# Patient Record
Sex: FEMALE | Race: WHITE
[De-identification: names, ages, dates, MRNs, and addresses within clinical notes are randomized per-mention and may not be internally consistent; named-entity substitution may affect disease eponyms.]

---

## 2017-03-13 ENCOUNTER — HOSPITAL ENCOUNTER (OUTPATIENT)
Dept: HOSPITAL 75 - RAD | Age: 62
End: 2017-03-13
Attending: FAMILY MEDICINE
Payer: COMMERCIAL

## 2017-03-13 DIAGNOSIS — R06.00: Primary | ICD-10-CM

## 2017-03-13 PROCEDURE — 71020: CPT

## 2017-03-13 NOTE — DIAGNOSTIC IMAGING REPORT
PA and lateral views of the chest



Indication:  Dyspnea



Findings: The lungs are clear. The heart size is normal. There

is no effusion or pneumothorax



The mediastinum and zi appear unremarkable. Surgical clips in

the upper abdomen seen.



Impression: Unremarkable study.



Dictated by:



Dictated on workstation # [id number]

## 2017-03-13 NOTE — XMS REPORT
Continuity of Care Document

 Created on: 2017



Amalia Sommers

External Reference #: [id number]

: 1955

Sex: Female



Demographics







 Address  [address] [place]  [address]

 

 Home Phone  [phone]

 

 Preferred Language  English

 

 Marital Status  

 

 Cheondoism Affiliation  CHR

 

 Race  White or 

 

 Ethnic Group  Not  or 





Author







 Author  [facility]

 

 Organization  [facility]

 

 Address  Unknown

 

 Phone  Unavailable







Support







 Name  Relationship  Address  Phone

 

 , Rudi Sommers  [employer]  -

Unknown  [phone]







Care Team Providers







 Care Team Member Name  Role  Phone

 

 Zuleika Slater  PCP  [phone]







Source Comments

Some departments are not documenting in the electronic medical record.  If you 
do not see the information that you expected, contact Release of Information in 
the Health Information Management department at [phone] for further 
assistance in locating additional records.[facility]



Active Allergies and Adverse Reactions







    



  Allergen   Noted Date   Severity   Reactions   Comments

 

    



  Amitriptyline   2011    JOINT PAIN 

 

    



  Axid   2011    HIVES, EDEMA 

 

    



  Biaxin   2011    ANAPHYLAXIS 

 

    



  Erythromycin   2011    SEE COMMENTS   Severe stomach pains and



      vomiting

 

    



  Macrodantin   2011    HIVES 

 

    



  Nitrofurantoin   2011    UNKNOWN 

 

    



  Penicillins   2011    ANAPHYLAXIS, HIVES 

 

    



  Premarin   2011    JOINT PAIN, MUSCLE PAIN 

 

    



  Prilosec   2011    DIARRHEA 

 

    



  Reglan   2011    JOINT PAIN 

 

    



  Sulfa (Sulfonamide   2011    ANAPHYLAXIS, HIVES 



  Antibiotics)    

 

    



  Terfenadine   2011    HIVES, ANGIOEDEMA 







Current Medications







      



  Prescription   Sig.   Disp.   Refills   Start   End Date   Status



      Date  

 

      



  cyanocobalamin (VITAMIN   Inject 1,000 mcg to       Active



  B-12, RUBRAMIN) 1,000   area(s) as directed every     



  mcg/mL IJ injection   30 days.     

 

      



  bisoprolol (ZEBETA) 5 mg   Take 10 mg by mouth at       Active



  PO tablet   bedtime daily.     

 

      



  trazodone (DESYREL) 50 mg   Take 50 mg by mouth at       Active



  PO tablet   bedtime daily.     

 

      



  estradiol(+)   Apply 1 Patch to top of       Active



  (VIVELLE-DOT) 0.1 mg/day   skin as directed twice     



  TD patch   weekly.     

 

      



  ergocalciferol (VITAMIN   Take 50,000 Units by       Active



  D) 50,000 unit capsule   mouth every 7 days. Takes     



   on      

 

      



  DULoxetine DR (CYMBALTA)   Take 60 mg by mouth       Active



  60 mg capsule   daily.     

 

      



  levothyroxine (SYNTHROID)   Take 75 mcg by mouth       Active



  75 mcg tablet   daily. Brand name only     

 

      



  dicyclomine (BENTYL) 10   Take 10 mg by mouth four       Active



  mg capsule   times daily as needed.     

 

      



  hyoscyamine (ANASPAZ;   Place 125 mcg under       Active



  NULEV; SYMAX FASTABS;   tongue every 4 hours as     



  HYOMAX-FT; ED-SPAZ;   needed.     



  OSCIMIN) 0.125 mg rapid      



  dissolve tablet      

 

      



  fexofenadine(+) (ALLEGRA)   Take 180 mg by mouth as       Active



  180 mg tablet   Needed.     

 

      



  senna/docusate   Take 1 Tab by mouth twice   30 Tab   3   20    Active



  (SENOKOT-S) 8.6/50 mg   daily.     14  



  tablet      







Active Problems







 



  Problem   Noted Date

 

 



  Renal mass   2014

 

 



  Renal oncocytoma   2014













  Overview:



  Incidentally found on imaging for ankylosing spondylitis,  Approximately



  1.2cm x 1.2cm.



  Completely asymptomatic.





  3/17/2014: Kidney, "left renal mass", partial nephrectomy:



  Renal oncocytoma





  2015: lt kidney wnl. Right with small mass 1cm.





  2016:



  1. Prior partial left nephrectomy without recurrent left renal mass.





  2. Stable hyperechoic area in or adjacent to the lower pole right kidney



  which is most compatible with a benign angiomyolipoma or tiny nodular area



  of perinephric fat.





  3. Persistent diffuse hepatic steatosis.





  L



  ast Assessment & Plan:



  Doing well



  Stable mass



  RTC 2 years with PAUL Brito MD









 



  Thoracic or lumbosacral neuritis or radiculitis, unspecified   2011

 

 



  Displacement of intervertebral disc, site unspecified, without myelopathy   

 

 



  Chronic low back pain   2011

 

 



  Spondyloarthropathy (HCC)   2011

 

 



  Radiculopathy   2011

 

 



  Osteoarthritis   2011

 

 



  Encounter for long-term (current) use of medications   2011

 

 



  Ankylosing spondylitis (HCC)   2011

 

 



  Sicca syndrome, Sjogren's (HCC)   2011

 

 



  Encounter for long-term (current) use of other medications   2011







Social History







    



  Tobacco Use   Types   Packs/Day   Years Used   Date

 

    



  Never Smoker    









   



  Smokeless Tobacco: Never   



  Used   









   



  Alcohol Use   Drinks/Week   oz/Week   Comments

 

   



  No   







Last Filed Vital Signs







  



  Vital Sign   Reading   Time Taken

 

  



  Blood Pressure   132/63   2016  9:59 AM CDT

 

  



  Pulse   71   2016  9:59 AM CDT

 

  



  Temperature   36.7   C (98.1   F)   10/12/2015  1:18 PM CDT

 

  



  Respiratory Rate   16   2015  9:07 AM CDT

 

  



  Height   1.689 m (5' 6.5")   2016  9:59 AM CDT

 

  



  Weight   88.27 kg (194 lb 9.6 oz)   2016  9:59 AM CDT

 

  



  Body Mass Index   30.94   2016  9:59 AM CDT

 

  



  Oxygen Saturation   96%   10/12/2015  2:25 PM CDT







Plan of Care







   



  Health Maintenance   Due Date   Last Done   Comments

 

   



  Hepatitis C Screening   1955  

 

   



  Physical (Comprehensive)   1962  



  Exam   

 

   



  Pertussis Vaccine   1966  

 

   



  Tetanus Vaccine   1972  

 

   



  Cervical Cancer Screening   1976  

 

   



  Breast Cancer Screening   1995  

 

   



  Shingles Vaccine   2015  

 

   



  Influenza Vaccine   2016  

 

   



  Colorectal Cancer   10/12/2025   10/12/2015 



  Screening   







Results from Last 3 Months

Not on file

## 2017-05-09 ENCOUNTER — HOSPITAL ENCOUNTER (OUTPATIENT)
Dept: HOSPITAL 75 - CARD | Age: 62
End: 2017-05-09
Attending: NURSE PRACTITIONER
Payer: COMMERCIAL

## 2017-05-09 VITALS — DIASTOLIC BLOOD PRESSURE: 64 MMHG | SYSTOLIC BLOOD PRESSURE: 149 MMHG

## 2017-05-09 VITALS — DIASTOLIC BLOOD PRESSURE: 61 MMHG | SYSTOLIC BLOOD PRESSURE: 156 MMHG

## 2017-05-09 VITALS — SYSTOLIC BLOOD PRESSURE: 149 MMHG | DIASTOLIC BLOOD PRESSURE: 71 MMHG

## 2017-05-09 DIAGNOSIS — R00.2: ICD-10-CM

## 2017-05-09 DIAGNOSIS — R06.09: ICD-10-CM

## 2017-05-09 DIAGNOSIS — I10: Primary | ICD-10-CM

## 2017-05-09 DIAGNOSIS — R07.89: ICD-10-CM

## 2017-05-09 PROCEDURE — 93017 CV STRESS TEST TRACING ONLY: CPT

## 2017-05-09 PROCEDURE — 78452 HT MUSCLE IMAGE SPECT MULT: CPT

## 2017-05-09 NOTE — STRESS TEST
DATE OF SERVICE:  05/09/2017



DATE:

05/09/2017.



ORDERING PHYSICIAN:

JAYME Garcia.



PRIMARY PHYSICIAN:

Dr. Slater 



CLINICAL DIAGNOSIS:

Chest discomfort, shortness of breath, hypertension, palpitations.



Baseline images were carried out after injection of 10.02 mCi of technetium-99m

Tetrofosmin.  This was followed by 0.4 mg of regadenoson and 30.1 mCi of

technetium-99m Tetrofosmin for stress imaging.  The electrocardiogram showed

sinus rhythm and the electrocardiogram did not change significantly with the

regadenoson infusion.  She reported shortness of breath and abdominal cramping

following regadenoson infusion, which resolved in a few minutes.  Review of

images at rest and following stress indicates diminished count uptake in the

anterolateral wall, both at rest and following regadenoson infusion.  This

appears to be a breast artifact.  Gated images show normal global left

ventricular systolic function with normal regional wall motion, including the

inferolateral wall.  Left ventricular ejection fraction is calculated to be 66%.

 Left ventricular end-diastolic volume is 66 mL.  TID is absent (1.1).



CONCLUSIONS:

1.  No distinct evidence of any significant myocardial ischemia or infarction on

this study.

2.  Normal regional wall motion.

3.  Normal global left ventricular systolic function with a calculated ejection

fraction of 66%.

4.  Normal left ventricular cavity size.





Job ID: [id number]

DocumentID: [id number]

Dictated Date:  05/09/2017 15:36:55

Transcription Date: 05/09/2017 16:05:29

Dictated By: PALAK LIZAMA MD, MA, FACP, FACC,

## 2017-05-11 ENCOUNTER — HOSPITAL ENCOUNTER (OUTPATIENT)
Dept: HOSPITAL 75 - CARD | Age: 62
End: 2017-05-11
Attending: NURSE PRACTITIONER
Payer: COMMERCIAL

## 2017-05-11 DIAGNOSIS — R06.09: ICD-10-CM

## 2017-05-11 DIAGNOSIS — R00.2: ICD-10-CM

## 2017-05-11 DIAGNOSIS — R07.89: ICD-10-CM

## 2017-05-11 DIAGNOSIS — I10: Primary | ICD-10-CM

## 2017-06-28 ENCOUNTER — APPOINTMENT (RX ONLY)
Dept: URBAN - METROPOLITAN AREA CLINIC 51 | Facility: CLINIC | Age: 62
Setting detail: DERMATOLOGY
End: 2017-06-28

## 2017-06-28 DIAGNOSIS — L82.1 OTHER SEBORRHEIC KERATOSIS: ICD-10-CM

## 2017-06-28 DIAGNOSIS — L81.4 OTHER MELANIN HYPERPIGMENTATION: ICD-10-CM

## 2017-06-28 PROBLEM — E13.9 OTHER SPECIFIED DIABETES MELLITUS WITHOUT COMPLICATIONS: Status: ACTIVE | Noted: 2017-06-28

## 2017-06-28 PROBLEM — I10 ESSENTIAL (PRIMARY) HYPERTENSION: Status: ACTIVE | Noted: 2017-06-28

## 2017-06-28 PROBLEM — D48.5 NEOPLASM OF UNCERTAIN BEHAVIOR OF SKIN: Status: ACTIVE | Noted: 2017-06-28

## 2017-06-28 PROCEDURE — 99213 OFFICE O/P EST LOW 20 MIN: CPT | Mod: 25

## 2017-06-28 PROCEDURE — 11100: CPT | Mod: 59

## 2017-06-28 PROCEDURE — 17110 DESTRUCTION B9 LES UP TO 14: CPT

## 2017-06-28 PROCEDURE — ? BIOPSY BY PUNCH METHOD

## 2017-06-28 PROCEDURE — ? LIQUID NITROGEN

## 2017-06-28 PROCEDURE — ? COUNSELING

## 2017-06-28 ASSESSMENT — LOCATION ZONE DERM
LOCATION ZONE: LEG
LOCATION ZONE: TRUNK
LOCATION ZONE: HAND
LOCATION ZONE: ARM
LOCATION ZONE: FACE

## 2017-06-28 ASSESSMENT — LOCATION DETAILED DESCRIPTION DERM
LOCATION DETAILED: INFERIOR MID FOREHEAD
LOCATION DETAILED: RIGHT MEDIAL MALAR CHEEK
LOCATION DETAILED: LEFT DISTAL DORSAL FOREARM
LOCATION DETAILED: LEFT DISTAL PRETIBIAL REGION
LOCATION DETAILED: LEFT LATERAL FOREHEAD
LOCATION DETAILED: RIGHT DISTAL PRETIBIAL REGION
LOCATION DETAILED: RIGHT PROXIMAL PRETIBIAL REGION
LOCATION DETAILED: RIGHT INFERIOR MEDIAL FOREHEAD
LOCATION DETAILED: RIGHT INFERIOR FOREHEAD
LOCATION DETAILED: RIGHT MID-UPPER BACK
LOCATION DETAILED: RIGHT DISTAL DORSAL FOREARM
LOCATION DETAILED: RIGHT LATERAL ABDOMEN
LOCATION DETAILED: LEFT ULNAR DORSAL HAND
LOCATION DETAILED: LEFT INFERIOR LATERAL FOREHEAD
LOCATION DETAILED: LEFT RADIAL DORSAL HAND
LOCATION DETAILED: RIGHT FOREHEAD

## 2017-06-28 ASSESSMENT — LOCATION SIMPLE DESCRIPTION DERM
LOCATION SIMPLE: LEFT FOREHEAD
LOCATION SIMPLE: ABDOMEN
LOCATION SIMPLE: RIGHT FOREHEAD
LOCATION SIMPLE: RIGHT FOREARM
LOCATION SIMPLE: RIGHT CHEEK
LOCATION SIMPLE: LEFT FOREARM
LOCATION SIMPLE: INFERIOR FOREHEAD
LOCATION SIMPLE: LEFT HAND
LOCATION SIMPLE: RIGHT UPPER BACK
LOCATION SIMPLE: LEFT PRETIBIAL REGION
LOCATION SIMPLE: RIGHT PRETIBIAL REGION

## 2017-06-28 NOTE — PROCEDURE: LIQUID NITROGEN
Detail Level: Zone
Duration Of Freeze Thaw-Cycle (Seconds): 5
Post-Care Instructions: I reviewed with the patient in detail post-care instructions. Patient is to wear sunprotection, and avoid picking at any of the treated lesions. Pt may apply Vaseline to crusted or scabbing areas.
Number Of Freeze-Thaw Cycles: 5 freeze-thaw cycles
Render Post-Care Instructions In Note?: no
Consent: The patient's  verbal consent was obtained including but not limited to risks of crusting, scabbing, blistering, scarring, darker or lighter pigmentary change, recurrence, incomplete removal and infection.
Medical Necessity Information: It is in your best interest to select a reason for this procedure from the list below. All of these items fulfill various CMS LCD requirements except the new and changing color options.
Medical Necessity Clause: This procedure was medically necessary because the lesions that were treated were: Itchy, irritated by clothing
Bill Insurance (You Assume Risk Of Denial Or Audit By Selecting Yes): Yes

## 2017-06-28 NOTE — PROCEDURE: BIOPSY BY PUNCH METHOD
Size Of Lesion In Cm (Optional): 0
Post-Care Instructions: I reviewed with the patient in detail post-care instructions. Patient is to keep the biopsy site dry overnight, and then apply vaseline twice daily until healed. Patient may apply hydrogen peroxide soaks to remove any crusting.
Render Post-Care Instructions In Note?: yes
Bill 26104 For Specimen Handling/Conveyance To Laboratory?: no
Biopsy Type: H and E
Consent: verbal consent was obtained and risks were reviewed including but not limited to scarring, infection, bleeding, scabbing, incomplete removal, nerve damage and allergy to anesthesia.
Epidermal Sutures: 4-0 Nylon
Lab Facility: [id number]
Anesthesia Volume In Cc (Will Not Render If 0): 2
Detail Level: Detailed
Wound Care: Aquaphor
Suture Removal: 8 days
Notification Instructions: Patient will be notified of biopsy results. However, patient instructed to call the office if not contacted within 2 weeks.
Anesthesia Type: 1% lidocaine with epinephrine
Hemostasis: Pressure
Billing Type: Third-Party Bill
Punch Size In Mm: 4
Lab: [id number]
Home Suture Removal Text: Patient was provided a home suture removal kit and will remove their sutures at home.  If they have any questions or difficulties they will call the office.
Dressing: bandage

## 2018-06-10 ENCOUNTER — HOSPITAL ENCOUNTER (OUTPATIENT)
Dept: HOSPITAL 75 - LAB | Age: 63
End: 2018-06-10
Attending: NURSE PRACTITIONER
Payer: COMMERCIAL

## 2018-06-10 DIAGNOSIS — R11.0: ICD-10-CM

## 2018-06-10 DIAGNOSIS — N10: Primary | ICD-10-CM

## 2018-06-10 LAB
ALBUMIN SERPL-MCNC: 4.4 GM/DL (ref 3.2–4.5)
ALP SERPL-CCNC: 74 U/L (ref 40–136)
ALT SERPL-CCNC: 23 U/L (ref 0–55)
BASOPHILS # BLD AUTO: 0 10^3/UL (ref 0–0.1)
BASOPHILS NFR BLD AUTO: 0 % (ref 0–10)
BILIRUB SERPL-MCNC: 0.3 MG/DL (ref 0.1–1)
BUN/CREAT SERPL: 15
CALCIUM SERPL-MCNC: 9.5 MG/DL (ref 8.5–10.1)
CHLORIDE SERPL-SCNC: 103 MMOL/L (ref 98–107)
CO2 SERPL-SCNC: 21 MMOL/L (ref 21–32)
CREAT SERPL-MCNC: 0.84 MG/DL (ref 0.6–1.3)
EOSINOPHIL # BLD AUTO: 0 10^3/UL (ref 0–0.3)
EOSINOPHIL NFR BLD AUTO: 0 % (ref 0–10)
ERYTHROCYTE [DISTWIDTH] IN BLOOD BY AUTOMATED COUNT: 13.6 % (ref 10–14.5)
GFR SERPLBLD BASED ON 1.73 SQ M-ARVRAT: > 60 ML/MIN
GLUCOSE SERPL-MCNC: 110 MG/DL (ref 70–105)
HCT VFR BLD CALC: 41 % (ref 35–52)
HGB BLD-MCNC: 14.4 G/DL (ref 11.5–16)
LYMPHOCYTES # BLD AUTO: 2 X 10^3 (ref 1–4)
LYMPHOCYTES NFR BLD AUTO: 20 % (ref 12–44)
MANUAL DIFFERENTIAL PERFORMED BLD QL: NO
MCH RBC QN AUTO: 32 PG (ref 25–34)
MCHC RBC AUTO-ENTMCNC: 35 G/DL (ref 32–36)
MCV RBC AUTO: 91 FL (ref 80–99)
MONOCYTES # BLD AUTO: 0.9 X 10^3 (ref 0–1)
MONOCYTES NFR BLD AUTO: 10 % (ref 0–12)
NEUTROPHILS # BLD AUTO: 6.8 X 10^3 (ref 1.8–7.8)
NEUTROPHILS NFR BLD AUTO: 69 % (ref 42–75)
PLATELET # BLD: 357 10^3/UL (ref 130–400)
PMV BLD AUTO: 9.2 FL (ref 7.4–10.4)
POTASSIUM SERPL-SCNC: 4.8 MMOL/L (ref 3.6–5)
PROT SERPL-MCNC: 8.4 GM/DL (ref 6.4–8.2)
RBC # BLD AUTO: 4.47 10^6/UL (ref 4.35–5.85)
SODIUM SERPL-SCNC: 136 MMOL/L (ref 135–145)
WBC # BLD AUTO: 9.8 10^3/UL (ref 4.3–11)

## 2018-06-10 PROCEDURE — 85025 COMPLETE CBC W/AUTO DIFF WBC: CPT

## 2018-06-10 PROCEDURE — 36415 COLL VENOUS BLD VENIPUNCTURE: CPT

## 2018-06-10 PROCEDURE — 80053 COMPREHEN METABOLIC PANEL: CPT

## 2018-06-10 PROCEDURE — 86141 C-REACTIVE PROTEIN HS: CPT

## 2018-06-26 ENCOUNTER — HOSPITAL ENCOUNTER (INPATIENT)
Dept: HOSPITAL 75 - 4TH | Age: 63
LOS: 3 days | Discharge: HOME | DRG: 194 | End: 2018-06-29
Attending: FAMILY MEDICINE | Admitting: FAMILY MEDICINE
Payer: COMMERCIAL

## 2018-06-26 VITALS — HEIGHT: 66 IN | BODY MASS INDEX: 28.93 KG/M2 | WEIGHT: 180 LBS

## 2018-06-26 VITALS — SYSTOLIC BLOOD PRESSURE: 135 MMHG | DIASTOLIC BLOOD PRESSURE: 62 MMHG

## 2018-06-26 VITALS — DIASTOLIC BLOOD PRESSURE: 58 MMHG | SYSTOLIC BLOOD PRESSURE: 126 MMHG

## 2018-06-26 DIAGNOSIS — K21.9: ICD-10-CM

## 2018-06-26 DIAGNOSIS — Z86.010: ICD-10-CM

## 2018-06-26 DIAGNOSIS — K44.9: ICD-10-CM

## 2018-06-26 DIAGNOSIS — Z87.11: ICD-10-CM

## 2018-06-26 DIAGNOSIS — Z90.710: ICD-10-CM

## 2018-06-26 DIAGNOSIS — J18.9: Primary | ICD-10-CM

## 2018-06-26 DIAGNOSIS — K57.90: ICD-10-CM

## 2018-06-26 DIAGNOSIS — M45.9: ICD-10-CM

## 2018-06-26 DIAGNOSIS — K50.90: ICD-10-CM

## 2018-06-26 DIAGNOSIS — I25.10: ICD-10-CM

## 2018-06-26 DIAGNOSIS — Z87.440: ICD-10-CM

## 2018-06-26 DIAGNOSIS — K59.00: ICD-10-CM

## 2018-06-26 DIAGNOSIS — I10: ICD-10-CM

## 2018-06-26 DIAGNOSIS — I25.2: ICD-10-CM

## 2018-06-26 DIAGNOSIS — L93.0: ICD-10-CM

## 2018-06-26 DIAGNOSIS — Z87.442: ICD-10-CM

## 2018-06-26 DIAGNOSIS — E11.9: ICD-10-CM

## 2018-06-26 DIAGNOSIS — R33.9: ICD-10-CM

## 2018-06-26 DIAGNOSIS — E89.0: ICD-10-CM

## 2018-06-26 DIAGNOSIS — M19.91: ICD-10-CM

## 2018-06-26 DIAGNOSIS — I34.1: ICD-10-CM

## 2018-06-26 LAB
BASOPHILS # BLD AUTO: 0 10^3/UL (ref 0–0.1)
BASOPHILS NFR BLD AUTO: 0 % (ref 0–10)
BUN/CREAT SERPL: 14
CALCIUM SERPL-MCNC: 9.4 MG/DL (ref 8.5–10.1)
CHLORIDE SERPL-SCNC: 102 MMOL/L (ref 98–107)
CO2 SERPL-SCNC: 22 MMOL/L (ref 21–32)
CREAT SERPL-MCNC: 0.84 MG/DL (ref 0.6–1.3)
EOSINOPHIL # BLD AUTO: 0 10^3/UL (ref 0–0.3)
EOSINOPHIL NFR BLD AUTO: 0 % (ref 0–10)
ERYTHROCYTE [DISTWIDTH] IN BLOOD BY AUTOMATED COUNT: 13.3 % (ref 10–14.5)
GFR SERPLBLD BASED ON 1.73 SQ M-ARVRAT: > 60 ML/MIN
GLUCOSE SERPL-MCNC: 134 MG/DL (ref 70–105)
HCT VFR BLD CALC: 37 % (ref 35–52)
HGB BLD-MCNC: 12.9 G/DL (ref 11.5–16)
LYMPHOCYTES # BLD AUTO: 2.2 X 10^3 (ref 1–4)
LYMPHOCYTES NFR BLD AUTO: 23 % (ref 12–44)
MANUAL DIFFERENTIAL PERFORMED BLD QL: NO
MCH RBC QN AUTO: 32 PG (ref 25–34)
MCHC RBC AUTO-ENTMCNC: 35 G/DL (ref 32–36)
MCV RBC AUTO: 91 FL (ref 80–99)
MONOCYTES # BLD AUTO: 0.8 X 10^3 (ref 0–1)
MONOCYTES NFR BLD AUTO: 8 % (ref 0–12)
NEUTROPHILS # BLD AUTO: 6.4 X 10^3 (ref 1.8–7.8)
NEUTROPHILS NFR BLD AUTO: 68 % (ref 42–75)
PLATELET # BLD: 435 10^3/UL (ref 130–400)
PMV BLD AUTO: 9 FL (ref 7.4–10.4)
POTASSIUM SERPL-SCNC: 4.4 MMOL/L (ref 3.6–5)
RBC # BLD AUTO: 4.05 10^6/UL (ref 4.35–5.85)
SODIUM SERPL-SCNC: 135 MMOL/L (ref 135–145)
WBC # BLD AUTO: 9.5 10^3/UL (ref 4.3–11)

## 2018-06-26 PROCEDURE — 85025 COMPLETE CBC W/AUTO DIFF WBC: CPT

## 2018-06-26 PROCEDURE — 80048 BASIC METABOLIC PNL TOTAL CA: CPT

## 2018-06-26 PROCEDURE — 94664 DEMO&/EVAL PT USE INHALER: CPT

## 2018-06-26 PROCEDURE — 36415 COLL VENOUS BLD VENIPUNCTURE: CPT

## 2018-06-26 PROCEDURE — 82962 GLUCOSE BLOOD TEST: CPT

## 2018-06-26 PROCEDURE — 80053 COMPREHEN METABOLIC PANEL: CPT

## 2018-06-26 PROCEDURE — 87040 BLOOD CULTURE FOR BACTERIA: CPT

## 2018-06-26 PROCEDURE — 94640 AIRWAY INHALATION TREATMENT: CPT

## 2018-06-26 PROCEDURE — 94760 N-INVAS EAR/PLS OXIMETRY 1: CPT

## 2018-06-26 PROCEDURE — 71046 X-RAY EXAM CHEST 2 VIEWS: CPT

## 2018-06-26 PROCEDURE — 85027 COMPLETE CBC AUTOMATED: CPT

## 2018-06-26 RX ADMIN — ENOXAPARIN SODIUM SCH MG: 100 INJECTION SUBCUTANEOUS at 15:38

## 2018-06-26 RX ADMIN — GABAPENTIN SCH MG: 300 CAPSULE ORAL at 21:37

## 2018-06-26 RX ADMIN — DOCUSATE SODIUM SCH MG: 100 CAPSULE ORAL at 20:22

## 2018-06-26 RX ADMIN — SODIUM CHLORIDE SCH MLS/HR: 900 INJECTION, SOLUTION INTRAVENOUS at 16:11

## 2018-06-26 RX ADMIN — DULOXETINE HYDROCHLORIDE SCH MG: 30 CAPSULE, DELAYED RELEASE ORAL at 21:37

## 2018-06-26 RX ADMIN — IPRATROPIUM BROMIDE AND ALBUTEROL SULFATE SCH ML: .5; 3 SOLUTION RESPIRATORY (INHALATION) at 21:42

## 2018-06-26 RX ADMIN — IPRATROPIUM BROMIDE AND ALBUTEROL SULFATE SCH ML: .5; 3 SOLUTION RESPIRATORY (INHALATION) at 18:13

## 2018-06-26 RX ADMIN — INSULIN ASPART SCH UNIT: 100 INJECTION, SOLUTION INTRAVENOUS; SUBCUTANEOUS at 15:46

## 2018-06-26 RX ADMIN — OXYCODONE HYDROCHLORIDE AND ACETAMINOPHEN PRN TAB: 10; 325 TABLET ORAL at 18:59

## 2018-06-26 RX ADMIN — SODIUM CHLORIDE SCH MLS/HR: 900 INJECTION INTRAVENOUS at 15:33

## 2018-06-26 RX ADMIN — AMLODIPINE BESYLATE SCH MG: 5 TABLET ORAL at 21:37

## 2018-06-26 RX ADMIN — INSULIN ASPART SCH UNIT: 100 INJECTION, SOLUTION INTRAVENOUS; SUBCUTANEOUS at 21:37

## 2018-06-26 RX ADMIN — Medication SCH ML: at 22:01

## 2018-06-26 NOTE — HISTORY & PHYSICIAL
History of Present Illness


History of Present Illness


Reason for visit/HPI


This is a 63 year old female who had presented to my office the day prior to 

admission with complaint of severe right upper quadrant and right flank pain.  

She had been having intermittent pain off and on for the past few weeks.  She 

also complained of coughing spasms which exacerbated her pain and was having 

chills.  She was sent for a CT scan of her abdomen and pelvis and was found to 

have a right sided pneumonia.  She was given Rocephin IM and started on 

Zithromax but she presented back to my office today with no improvement in her 

symptoms so it was decided to admit her for IV antibiotics.


Date of Admission


Jun 26, 2018 at 2:30 pm


Date Seen by Provider:  Jun 26, 2018


Time Seen by Provider:  19:44


I consulted on this patient on


6/26/18


 19:44


Attending Physician


Zuleika Slater DO


Admitting Physician


Zuleika Slater DO


Consult








Allergies and Home Medications


Allergies


Coded Allergies:  


     Penicillins (Verified  Allergy, Unknown, 4/5/14)


     clarithromycin (Verified  Allergy, Unknown, 4/5/14)


     gluten (Verified  Allergy, Unknown, 4/7/14)


     metoclopramide (Unverified  Allergy, Unknown, 3/30/16)


     piroxicam (Verified  Allergy, Unknown, Pt has received Ketorolac w/o issue

, 6/26/18)


     sulfacetamide sodium (Verified  Allergy, Unknown, 4/5/14)





Home Medications


Albuterol Sulfate 18 Gm Hfa.aer.ad, 2 PUFF INH Q4H PRN for SHORTNESS OF BREATH, 

(Reported)


Albuterol Sulfate 2.5 Mg/3 Ml Vial.neb, 2.5 MG NEB Q4H PRN for SHORTNESS OF 

BREATH, (Reported)


Amlodipine Besylate 5 Mg Tablet, 5 MG PO BID, (Reported)


Azithromycin 250 Mg Tablet, PO UD, (Reported)


   5 DAY SUPPLY FILLED 6-25-18 


Bisoprolol Fumarate 10 Mg Tablet, 10 MG PO BID, (Reported)


Cholecalciferol (Vitamin D3) 2,000 Unit Capsule, 2,000 UNIT PO DAILY, (Reported)


Cyanocobalamin 1,000 Mcg/Ml Inj, 1,000 MCG IM EVERY 2 WEEKS, (Reported)


Docusate Sodium 100 Mg Capsule, 200 MG PO BID, (Reported)


Duloxetine HCl 60 Mg Capsule.dr, 60 MG PO HS, (Reported)


Ergocalciferol (Vitamin D2) 50,000 Unit Capsule, 50,000 UNITS PO Sa, (Reported)


Estradiol 1 Each Patch.tdsw, 1 PATCH TD EVERY 3 DAYS, (Reported)


Fexofenadine HCl 180 Mg Tablet, 180 MG PO DAILY PRN for ALLERGIES, (Reported)


Gabapentin 300 Mg Capsule, 300 MG PO HS, (Reported)


Hyoscyamine Sulfate 0.125 Mg Tab.subl, 0.125 MG PO Q6H PRN for SPASMS, (Reported

)


Levothyroxine Sodium 75 Mcg Tablet, 75 MCG PO DAILY, (Reported)


Liothyronine Sodium 5 Mcg Tablet, 5 MCG PO DAILY, (Reported)


Mesalamine 250 Mg Capsule.er, 250 MG PO DAILY, (Reported)


Pantoprazole Sodium 40 Mg Tablet.dr, 40 MG PO BID, (Reported)


Sennosides/Docusate Sodium 1 Each Tablet, 2 TAB PO BID PRN for CONSTIPATION-5TH 

LINE, (Reported)


Sitagliptin Phosphate 100 Mg Tablet, 100 MG PO DAILY, (Reported)





Patient Home Medication List


Home Medication List Reviewed:  Yes





Past Medical-Social-Family Hx


Patient Social History


Alcohol Use:  Denies Use


Number of Drinks Today:  0


Recreational Drug Use:  No


Physical Abuse Screen:  No


Sexual Abuse:  No


Recent Foreign Travel:  No


Contact w/other who traveled:  No


Recent Hopitalizations:  No


Recent Infectious Disease Expo:  No





Immunizations Up To Date


Tetanus Booster (TDap):  Less than 5yrs


Pediatric:  Yes


Date of Pneumonia Vaccine:  Sep 6, 2012


Date of Influenza Vaccine:  Nov 10, 2016





Seasonal Allergies


Seasonal Allergies:  No





Surgeries


Yes


Appendectomy, Breast, Cardiac, Gallbladder, Hysterectomy, Oophorectomy, 

Orthopedic, Thyroidectomy, Tonsillectomy





Respiratory


Yes


Currently Using CPAP:  No


Currently Using BIPAP:  No





Cardiovascular


Yes (mitral valve prolapse, heart caths no stents)


Coronary Artery Disease, Heart Attack, Valvular Heart Disease





Neurological


Yes





Reproductive System


Hx Reproductive Disorders:  Yes (TOTAL HYSTERECTOMY)


Sexually Transmitted Disease:  No


HIV/AIDS:  No


Female Reproductive Disorders:  Menstrual Problems, Endometriosis, Ovarian Cyst


GYN History:  Hysterectomy





Genitourinary


Kidney Infection, Kidney Stones, UTI-Chronic





Gastrointestinal


Yes (CHRONIC ABDOMINAL PAIN )


Gastroesophageal Reflux, Crohns Disease, Diverticulosis, Polyps, Hiatal Hernia, 

Ulcer, Gall Bladder Disease, Irritable Bowel





Musculoskeletal


Yes (ANKLYLOSING SPONDYLITIS)


Degenerate Disk Disease, Arthritis, Chronic Back Pain





Endocrine


History of Endocrine Disorders:  Yes


Endocrine Disorders:  Hypothyroidsim, Lupus





HEENT


Loss of Vision:  Denies


Hearing Impairment:  Denies





Cancer


No





Psychosocial


History of Psychiatric Problem:  No





Integumentary


History of Skin or Integumenta:  Yes (STAPH INFECTIONS)





Blood Transfusions


History of Blood Disorders:  No


Adverse Reaction to a Blood Tr:  No





Family Medical History


Family Hx:  


Family history: Cardiovascular disease


Family history: Diabetes mellitus


  03 MOTHER


  09 BROTHER


Family history: Glaucoma


  03 MOTHER


Family history: Thyroid disorder


  03 MOTHER


Heart disease


  03 FATHER (AORTIC VALVE REPLACED)





Constitutional:  chills, weakness


EENTM:  No see HPI, No no symptoms reported, No ear discharge, No hearing loss, 

No ear pain, No blurred vision, No double vision, No eye pain, No tearing, No 

vision loss, No dental problems, No hoarseness, No mouth pain, No mouth swelling

, No epistaxis, No nose congestion, No nose pain, No throat pain, No throat 

swelling, No other


Respiratory:  cough


Cardiovascular:  No no symptoms reported, No see HPI, No chest pain, No edema, 

No Hx of Intervention, No palpitations, No syncope, No vascular heart diseas, 

No other


Gastrointestinal:  RUQ


Genitourinary:  pain (right flank)


Musculoskeletal:  back pain


Skin:  No no symptoms reported, No see HPI, No change in color, No change in 

hair/nails, No dryness, No hx of skin cancer, No lesions, No lumps, No pruritus

, No rash, No other


Psychiatric/Neurological:  Weakness





Physical Exam


Vital Signs





Vital Signs - First Documented








 6/26/18 6/26/18





 15:48 17:59


 


Temp 97.2 


 


Pulse 70 


 


Resp 20 


 


B/P (MAP) 135/62 (86) 


 


Pulse Ox 94 


 


O2 Delivery Room Air 


 


FiO2  21





Capillary Refill :


General Appearance:  Moderate Distress


HEENT:  Normal ENT Inspection


Neck:  Supple


Respiratory:  Crackles (right), Decreased Breath Sounds


Cardiovascular:  Regular Rate, Rhythm, Systolic Murmur, Gallop/S4


Gastrointestinal:  Normal Bowel Sounds, Soft, Tenderness (RUQ)


Rectal:  Deferred


Back:  CVA Tenderness (R)


Extremity:  Non Tender, No Calf Tenderness, No Pedal Edema


Neurologic/Psychiatric:  Alert, Oriented x3


Skin:  Warm/Dry


Lymphatic:  No Adenopathy


Comments


Laboratory Tests


6/26/18 15:40: Glucometer 152H


6/26/18 16:07: 


White Blood Count 9.5, Red Blood Count 4.05L, Hemoglobin 12.9, Hematocrit 37, 

Mean Corpuscular Volume 91, Mean Corpuscular Hemoglobin 32, Mean Corpuscular 

Hemoglobin Concent 35, Red Cell Distribution Width 13.3, Platelet Count 435H, 

Mean Platelet Volume 9.0, Neutrophils (%) (Auto) 68, Lymphocytes (%) (Auto) 23, 

Monocytes (%) (Auto) 8, Eosinophils (%) (Auto) 0, Basophils (%) (Auto) 0, 

Neutrophils # (Auto) 6.4, Lymphocytes # (Auto) 2.2, Monocytes # (Auto) 0.8, 

Eosinophils # (Auto) 0.0, Basophils # (Auto) 0.0, Sodium Level 135, Potassium 

Level 4.4, Chloride Level 102, Carbon Dioxide Level 22, Anion Gap 11, Blood 

Urea Nitrogen 12, Creatinine 0.84, Estimat Glomerular Filtration Rate > 60, BUN/

Creatinine Ratio 14, Glucose Level 134H, Calcium Level 9.4





Assessment/Plan


Assessment and Plan


1.  Acute Community Acquired Pneumonia with Failed outpatient treatment--admit 

for IV antibiotics--maxipime and zithromax 


2.  RUQ pain--chronic with recent exacerbation by above


3.  Hypertension--resume home meds


4.  Diabetes mellitus II--start accuchecks with SSI





Admission Diagnosis


Admission Status:  Inpatient Order (span 2 midnights)


Reason for Inpatient Admission:  


Will need IV antibiotics for at least 2 midnights due to failed outpatient


treatment for pneumonia











ZULEIKA SLATER DO Jun 26, 2018 7:51 pm

## 2018-06-27 VITALS — SYSTOLIC BLOOD PRESSURE: 121 MMHG | DIASTOLIC BLOOD PRESSURE: 63 MMHG

## 2018-06-27 VITALS — DIASTOLIC BLOOD PRESSURE: 61 MMHG | SYSTOLIC BLOOD PRESSURE: 106 MMHG

## 2018-06-27 VITALS — SYSTOLIC BLOOD PRESSURE: 126 MMHG | DIASTOLIC BLOOD PRESSURE: 68 MMHG

## 2018-06-27 VITALS — DIASTOLIC BLOOD PRESSURE: 69 MMHG | SYSTOLIC BLOOD PRESSURE: 124 MMHG

## 2018-06-27 VITALS — SYSTOLIC BLOOD PRESSURE: 132 MMHG | DIASTOLIC BLOOD PRESSURE: 62 MMHG

## 2018-06-27 VITALS — DIASTOLIC BLOOD PRESSURE: 69 MMHG | SYSTOLIC BLOOD PRESSURE: 115 MMHG

## 2018-06-27 VITALS — SYSTOLIC BLOOD PRESSURE: 124 MMHG | DIASTOLIC BLOOD PRESSURE: 58 MMHG

## 2018-06-27 LAB
ALBUMIN SERPL-MCNC: 3.6 GM/DL (ref 3.2–4.5)
ALP SERPL-CCNC: 61 U/L (ref 40–136)
ALT SERPL-CCNC: 13 U/L (ref 0–55)
BILIRUB SERPL-MCNC: 0.3 MG/DL (ref 0.1–1)
BUN/CREAT SERPL: 13
CALCIUM SERPL-MCNC: 8.8 MG/DL (ref 8.5–10.1)
CHLORIDE SERPL-SCNC: 102 MMOL/L (ref 98–107)
CO2 SERPL-SCNC: 22 MMOL/L (ref 21–32)
CREAT SERPL-MCNC: 0.84 MG/DL (ref 0.6–1.3)
ERYTHROCYTE [DISTWIDTH] IN BLOOD BY AUTOMATED COUNT: 13.4 % (ref 10–14.5)
GFR SERPLBLD BASED ON 1.73 SQ M-ARVRAT: > 60 ML/MIN
GLUCOSE SERPL-MCNC: 135 MG/DL (ref 70–105)
HCT VFR BLD CALC: 35 % (ref 35–52)
HGB BLD-MCNC: 11.8 G/DL (ref 11.5–16)
MCH RBC QN AUTO: 31 PG (ref 25–34)
MCHC RBC AUTO-ENTMCNC: 34 G/DL (ref 32–36)
MCV RBC AUTO: 92 FL (ref 80–99)
PLATELET # BLD: 404 10^3/UL (ref 130–400)
PMV BLD AUTO: 8.9 FL (ref 7.4–10.4)
POTASSIUM SERPL-SCNC: 4.1 MMOL/L (ref 3.6–5)
PROT SERPL-MCNC: 6.8 GM/DL (ref 6.4–8.2)
RBC # BLD AUTO: 3.75 10^6/UL (ref 4.35–5.85)
SODIUM SERPL-SCNC: 136 MMOL/L (ref 135–145)
WBC # BLD AUTO: 8.5 10^3/UL (ref 4.3–11)

## 2018-06-27 RX ADMIN — SODIUM CHLORIDE SCH MLS/HR: 900 INJECTION, SOLUTION INTRAVENOUS at 13:51

## 2018-06-27 RX ADMIN — IPRATROPIUM BROMIDE AND ALBUTEROL SULFATE SCH ML: .5; 3 SOLUTION RESPIRATORY (INHALATION) at 14:54

## 2018-06-27 RX ADMIN — INSULIN ASPART SCH UNIT: 100 INJECTION, SOLUTION INTRAVENOUS; SUBCUTANEOUS at 09:43

## 2018-06-27 RX ADMIN — PANTOPRAZOLE SODIUM SCH MG: 40 TABLET, DELAYED RELEASE ORAL at 08:09

## 2018-06-27 RX ADMIN — LIDOCAINE SCH EA: 50 PATCH CUTANEOUS at 08:11

## 2018-06-27 RX ADMIN — LIDOCAINE SCH EA: 50 PATCH CUTANEOUS at 02:04

## 2018-06-27 RX ADMIN — SODIUM CHLORIDE SCH MLS/HR: 900 INJECTION INTRAVENOUS at 14:55

## 2018-06-27 RX ADMIN — OXYCODONE HYDROCHLORIDE AND ACETAMINOPHEN PRN TAB: 10; 325 TABLET ORAL at 15:29

## 2018-06-27 RX ADMIN — Medication SCH ML: at 21:44

## 2018-06-27 RX ADMIN — MESALAMINE SCH MG: 250 CAPSULE ORAL at 08:10

## 2018-06-27 RX ADMIN — ENOXAPARIN SODIUM SCH MG: 100 INJECTION SUBCUTANEOUS at 13:53

## 2018-06-27 RX ADMIN — PANTOPRAZOLE SODIUM SCH MG: 40 TABLET, DELAYED RELEASE ORAL at 21:43

## 2018-06-27 RX ADMIN — CYCLOBENZAPRINE HYDROCHLORIDE SCH MG: 10 TABLET, FILM COATED ORAL at 21:44

## 2018-06-27 RX ADMIN — INSULIN ASPART SCH UNIT: 100 INJECTION, SOLUTION INTRAVENOUS; SUBCUTANEOUS at 21:43

## 2018-06-27 RX ADMIN — DULOXETINE HYDROCHLORIDE SCH MG: 30 CAPSULE, DELAYED RELEASE ORAL at 21:44

## 2018-06-27 RX ADMIN — AMLODIPINE BESYLATE SCH MG: 5 TABLET ORAL at 21:43

## 2018-06-27 RX ADMIN — CARVEDILOL SCH MCG: 25 TABLET, FILM COATED ORAL at 11:21

## 2018-06-27 RX ADMIN — OXYCODONE HYDROCHLORIDE AND ACETAMINOPHEN PRN TAB: 10; 325 TABLET ORAL at 08:12

## 2018-06-27 RX ADMIN — IPRATROPIUM BROMIDE AND ALBUTEROL SULFATE SCH ML: .5; 3 SOLUTION RESPIRATORY (INHALATION) at 06:27

## 2018-06-27 RX ADMIN — OXYCODONE HYDROCHLORIDE AND ACETAMINOPHEN PRN TAB: 10; 325 TABLET ORAL at 02:03

## 2018-06-27 RX ADMIN — AMLODIPINE BESYLATE SCH MG: 5 TABLET ORAL at 08:10

## 2018-06-27 RX ADMIN — LEVOTHYROXINE SODIUM SCH MCG: 75 TABLET ORAL at 07:00

## 2018-06-27 RX ADMIN — IPRATROPIUM BROMIDE AND ALBUTEROL SULFATE SCH ML: .5; 3 SOLUTION RESPIRATORY (INHALATION) at 18:56

## 2018-06-27 RX ADMIN — INSULIN ASPART SCH UNIT: 100 INJECTION, SOLUTION INTRAVENOUS; SUBCUTANEOUS at 07:00

## 2018-06-27 RX ADMIN — Medication SCH ML: at 06:59

## 2018-06-27 RX ADMIN — CYCLOBENZAPRINE HYDROCHLORIDE SCH MG: 10 TABLET, FILM COATED ORAL at 02:03

## 2018-06-27 RX ADMIN — SODIUM CHLORIDE SCH MLS/HR: 900 INJECTION INTRAVENOUS at 04:13

## 2018-06-27 RX ADMIN — DOCUSATE SODIUM SCH MG: 100 CAPSULE ORAL at 21:44

## 2018-06-27 RX ADMIN — GABAPENTIN SCH MG: 300 CAPSULE ORAL at 21:51

## 2018-06-27 RX ADMIN — LINAGLIPTIN SCH MG: 5 TABLET, FILM COATED ORAL at 08:15

## 2018-06-27 RX ADMIN — CYCLOBENZAPRINE HYDROCHLORIDE SCH MG: 10 TABLET, FILM COATED ORAL at 08:10

## 2018-06-27 RX ADMIN — IPRATROPIUM BROMIDE AND ALBUTEROL SULFATE SCH ML: .5; 3 SOLUTION RESPIRATORY (INHALATION) at 22:20

## 2018-06-27 RX ADMIN — Medication SCH ML: at 13:51

## 2018-06-27 RX ADMIN — INSULIN ASPART SCH UNIT: 100 INJECTION, SOLUTION INTRAVENOUS; SUBCUTANEOUS at 15:30

## 2018-06-27 RX ADMIN — IPRATROPIUM BROMIDE AND ALBUTEROL SULFATE SCH ML: .5; 3 SOLUTION RESPIRATORY (INHALATION) at 01:47

## 2018-06-27 RX ADMIN — IPRATROPIUM BROMIDE AND ALBUTEROL SULFATE SCH ML: .5; 3 SOLUTION RESPIRATORY (INHALATION) at 11:01

## 2018-06-27 RX ADMIN — DOCUSATE SODIUM SCH MG: 100 CAPSULE ORAL at 08:13

## 2018-06-27 RX ADMIN — CYCLOBENZAPRINE HYDROCHLORIDE SCH MG: 10 TABLET, FILM COATED ORAL at 13:51

## 2018-06-28 VITALS — SYSTOLIC BLOOD PRESSURE: 123 MMHG | DIASTOLIC BLOOD PRESSURE: 67 MMHG

## 2018-06-28 VITALS — SYSTOLIC BLOOD PRESSURE: 129 MMHG | DIASTOLIC BLOOD PRESSURE: 60 MMHG

## 2018-06-28 VITALS — DIASTOLIC BLOOD PRESSURE: 63 MMHG | SYSTOLIC BLOOD PRESSURE: 104 MMHG

## 2018-06-28 VITALS — SYSTOLIC BLOOD PRESSURE: 140 MMHG | DIASTOLIC BLOOD PRESSURE: 63 MMHG

## 2018-06-28 VITALS — SYSTOLIC BLOOD PRESSURE: 121 MMHG | DIASTOLIC BLOOD PRESSURE: 57 MMHG

## 2018-06-28 RX ADMIN — INSULIN ASPART SCH UNIT: 100 INJECTION, SOLUTION INTRAVENOUS; SUBCUTANEOUS at 15:50

## 2018-06-28 RX ADMIN — INSULIN ASPART SCH UNIT: 100 INJECTION, SOLUTION INTRAVENOUS; SUBCUTANEOUS at 21:26

## 2018-06-28 RX ADMIN — GABAPENTIN SCH MG: 300 CAPSULE ORAL at 21:23

## 2018-06-28 RX ADMIN — LIDOCAINE SCH EA: 50 PATCH CUTANEOUS at 08:13

## 2018-06-28 RX ADMIN — ENOXAPARIN SODIUM SCH MG: 100 INJECTION SUBCUTANEOUS at 14:09

## 2018-06-28 RX ADMIN — Medication SCH ML: at 15:47

## 2018-06-28 RX ADMIN — IPRATROPIUM BROMIDE AND ALBUTEROL SULFATE SCH ML: .5; 3 SOLUTION RESPIRATORY (INHALATION) at 14:43

## 2018-06-28 RX ADMIN — DOCUSATE SODIUM SCH MG: 100 CAPSULE ORAL at 21:23

## 2018-06-28 RX ADMIN — DOCUSATE SODIUM SCH MG: 100 CAPSULE ORAL at 08:10

## 2018-06-28 RX ADMIN — IPRATROPIUM BROMIDE AND ALBUTEROL SULFATE SCH ML: .5; 3 SOLUTION RESPIRATORY (INHALATION) at 06:57

## 2018-06-28 RX ADMIN — PANTOPRAZOLE SODIUM SCH MG: 40 TABLET, DELAYED RELEASE ORAL at 21:23

## 2018-06-28 RX ADMIN — INSULIN ASPART SCH UNIT: 100 INJECTION, SOLUTION INTRAVENOUS; SUBCUTANEOUS at 10:46

## 2018-06-28 RX ADMIN — AMLODIPINE BESYLATE SCH MG: 5 TABLET ORAL at 21:23

## 2018-06-28 RX ADMIN — SODIUM CHLORIDE SCH MLS/HR: 900 INJECTION INTRAVENOUS at 02:52

## 2018-06-28 RX ADMIN — PANTOPRAZOLE SODIUM SCH MG: 40 TABLET, DELAYED RELEASE ORAL at 08:10

## 2018-06-28 RX ADMIN — Medication SCH ML: at 05:55

## 2018-06-28 RX ADMIN — IPRATROPIUM BROMIDE AND ALBUTEROL SULFATE SCH ML: .5; 3 SOLUTION RESPIRATORY (INHALATION) at 10:31

## 2018-06-28 RX ADMIN — IPRATROPIUM BROMIDE AND ALBUTEROL SULFATE SCH ML: .5; 3 SOLUTION RESPIRATORY (INHALATION) at 02:20

## 2018-06-28 RX ADMIN — IPRATROPIUM BROMIDE AND ALBUTEROL SULFATE SCH ML: .5; 3 SOLUTION RESPIRATORY (INHALATION) at 17:11

## 2018-06-28 RX ADMIN — LINAGLIPTIN SCH MG: 5 TABLET, FILM COATED ORAL at 08:11

## 2018-06-28 RX ADMIN — CYCLOBENZAPRINE HYDROCHLORIDE SCH MG: 10 TABLET, FILM COATED ORAL at 21:23

## 2018-06-28 RX ADMIN — AMLODIPINE BESYLATE SCH MG: 5 TABLET ORAL at 08:11

## 2018-06-28 RX ADMIN — Medication SCH ML: at 21:24

## 2018-06-28 RX ADMIN — DULOXETINE HYDROCHLORIDE SCH MG: 30 CAPSULE, DELAYED RELEASE ORAL at 21:22

## 2018-06-28 RX ADMIN — IPRATROPIUM BROMIDE AND ALBUTEROL SULFATE SCH ML: .5; 3 SOLUTION RESPIRATORY (INHALATION) at 23:10

## 2018-06-28 RX ADMIN — CARVEDILOL SCH MCG: 25 TABLET, FILM COATED ORAL at 05:54

## 2018-06-28 RX ADMIN — SODIUM CHLORIDE SCH MLS/HR: 900 INJECTION INTRAVENOUS at 16:39

## 2018-06-28 RX ADMIN — MESALAMINE SCH MG: 250 CAPSULE ORAL at 08:11

## 2018-06-28 RX ADMIN — INSULIN ASPART SCH UNIT: 100 INJECTION, SOLUTION INTRAVENOUS; SUBCUTANEOUS at 05:26

## 2018-06-28 RX ADMIN — SODIUM CHLORIDE SCH MLS/HR: 900 INJECTION, SOLUTION INTRAVENOUS at 14:09

## 2018-06-28 RX ADMIN — LEVOTHYROXINE SODIUM SCH MCG: 75 TABLET ORAL at 05:54

## 2018-06-28 RX ADMIN — CYCLOBENZAPRINE HYDROCHLORIDE SCH MG: 10 TABLET, FILM COATED ORAL at 08:10

## 2018-06-28 RX ADMIN — CYCLOBENZAPRINE HYDROCHLORIDE SCH MG: 10 TABLET, FILM COATED ORAL at 13:24

## 2018-06-28 NOTE — PROGRESS NOTE (SOAP)
Subjective


Date Seen by Provider:  2018


Time Seen by Provider:  12:47


Subjective/Events-last exam


Fwup CAP, HTN, DMII, RUQ/flank pain.  Had urinary retention this morning and 

had to be straight cathed.  Has not had BM since hospital stay either.





Objective


Exam





Vital Signs








  Date Time  Temp Pulse Resp B/P (MAP) Pulse Ox O2 Delivery O2 Flow Rate FiO2


 


18 10:31     90 Room Air  


 


18 08:38 98.4 107 20 129/60 (83) 92 Room Air  


 


18 08:15      Room Air  


 


18 06:57     95 Room Air  


 


18 04:00 97.7 91 16 123/67 (85) 94 Room Air  


 


18 02:20     90 Room Air  


 


18 00:00 98.0 79 16 104/63 (77) 96 Room Air  


 


18 22:21     90 Room Air  


 


18 21:40  82  126/68 (87)    


 


18 20:00      Room Air  


 


18 19:30 98.6 86 18 124/69 (87) 94 Room Air  


 


18 18:56     88 Room Air  


 


18 16:05 99.0 76 18 124/58 (80) 96 Room Air  


 


18 14:54     92 Room Air  














I & O 


 


 18





 07:00


 


Intake Total 9000 ml


 


Output Total 4000 ml


 


Balance 5000 ml





Capillary Refill :


General Appearance:  No Apparent Distress


Neck:  Supple


Respiratory:  Decreased Breath Sounds (right base with crackles)


Cardiovascular:  Regular Rate, Rhythm


Gastrointestinal:  normal bowel sounds, non tender, soft


Extremity:  Non Tender, No Calf Tenderness, No Pedal Edema


Neurologic/Psychiatric:  Alert, Oriented x3





Results


Lab


Laboratory Tests


18 15:40: Glucometer 196H


18 21:16: Glucometer 229H


18 05:25: Glucometer 132H


18 10:46: Glucometer 162H





Microbiology


18 Blood Culture - Preliminary, Resulted


          No growth





Assessment/Plan


Assessment/Plan


Assess & Plan/Chief Complaint


1.  Right Sided Community Acquired Pneumonia with failed outpatient treatment--

continue IV maxipime and zithromax, CXR in AM


2.  Hypertension--back on home meds


3.  DMII--on home meds and SSI


4.  Constipation--suppository now as may be worsening urinary retention


5.  Urinary Retention--Increase activity and ambulate and straight cath prn, 

will add bethanecol if continues





Clinical Quality Measures


Admission Status


Admission Dx


1.  Acute Community Acquired Pneumonia with Failed outpatient treatment--admit 

for IV antibiotics--maxipime and zithromax 


2.  RUQ pain--chronic with recent exacerbation by above


3.  Hypertension--resume home meds


4.  Diabetes mellitus II--start accuchecks with SSI





DVT/VTE Risk/Contraindication:


Risk Factor Score Per Nursin


RFS Level Per Nursing on Admit:  4+=Very High











TIA MOURA DO 2018 12:50

## 2018-06-28 NOTE — PROGRESS NOTE (SOAP)
Subjective


Date Seen by Provider:  2018


Time Seen by Provider:  12:45


Subjective/Events-last exam


Fwup pneumonia, RUQ and right flank pain, HTN, DMII.  Feels like can take 

deeper breaths.





Objective


Exam





Vital Signs








  Date Time  Temp Pulse Resp B/P (MAP) Pulse Ox O2 Delivery O2 Flow Rate FiO2


 


18 10:31     90 Room Air  


 


18 08:38 98.4 107 20 129/60 (83) 92 Room Air  


 


18 08:15      Room Air  


 


18 06:57     95 Room Air  


 


18 04:00 97.7 91 16 123/67 (85) 94 Room Air  


 


18 02:20     90 Room Air  


 


18 00:00 98.0 79 16 104/63 (77) 96 Room Air  


 


18 22:21     90 Room Air  


 


18 21:40  82  126/68 (87)    


 


18 20:00      Room Air  


 


18 19:30 98.6 86 18 124/69 (87) 94 Room Air  


 


18 18:56     88 Room Air  


 


18 16:05 99.0 76 18 124/58 (80) 96 Room Air  


 


18 14:54     92 Room Air  














I & O 


 


 18





 07:00


 


Intake Total 9000 ml


 


Output Total 4000 ml


 


Balance 5000 ml





Capillary Refill :


General Appearance:  No Apparent Distress


Neck:  Supple


Respiratory:  Lungs Clear (with better aeration)


Cardiovascular:  Regular Rate, Rhythm


Gastrointestinal:  normal bowel sounds, soft, tenderness (RUQ and right flank)


Extremity:  Non Tender, No Calf Tenderness, No Pedal Edema


Neurologic/Psychiatric:  Alert, Oriented x3





Results


Lab


Laboratory Tests


18 15:40: Glucometer 196H


18 21:16: Glucometer 229H


18 05:25: Glucometer 132H


18 10:46: Glucometer 162H





Microbiology


18 Blood Culture - Preliminary, Resulted


          No growth





Assessment/Plan


Assessment/Plan


Assess & Plan/Chief Complaint


1.  Right Sided Community Acquired Pneumonia with failed outpatient treatment--

continue IV maxipime and zithromax


2.  Hypertension--back on home meds


3.  DMII--on home meds and SSI


4.  Increase activity--up to chair and ambulate





Clinical Quality Measures


Admission Status


Admission Dx


1.  Acute Community Acquired Pneumonia with Failed outpatient treatment--admit 

for IV antibiotics--maxipime and zithromax 


2.  RUQ pain--chronic with recent exacerbation by above


3.  Hypertension--resume home meds


4.  Diabetes mellitus II--start accuchecks with SSI





DVT/VTE Risk/Contraindication:


Risk Factor Score Per Nursin


RFS Level Per Nursing on Admit:  4+=Very High











TIA MOURA DO 2018 12:46

## 2018-06-29 VITALS — SYSTOLIC BLOOD PRESSURE: 110 MMHG | DIASTOLIC BLOOD PRESSURE: 60 MMHG

## 2018-06-29 VITALS — DIASTOLIC BLOOD PRESSURE: 83 MMHG | SYSTOLIC BLOOD PRESSURE: 143 MMHG

## 2018-06-29 RX ADMIN — PANTOPRAZOLE SODIUM SCH MG: 40 TABLET, DELAYED RELEASE ORAL at 08:39

## 2018-06-29 RX ADMIN — Medication SCH ML: at 05:58

## 2018-06-29 RX ADMIN — INSULIN ASPART SCH UNIT: 100 INJECTION, SOLUTION INTRAVENOUS; SUBCUTANEOUS at 05:57

## 2018-06-29 RX ADMIN — INSULIN ASPART SCH UNIT: 100 INJECTION, SOLUTION INTRAVENOUS; SUBCUTANEOUS at 11:37

## 2018-06-29 RX ADMIN — SODIUM CHLORIDE SCH MLS/HR: 900 INJECTION INTRAVENOUS at 03:51

## 2018-06-29 RX ADMIN — CYCLOBENZAPRINE HYDROCHLORIDE SCH MG: 10 TABLET, FILM COATED ORAL at 08:39

## 2018-06-29 RX ADMIN — IPRATROPIUM BROMIDE AND ALBUTEROL SULFATE SCH ML: .5; 3 SOLUTION RESPIRATORY (INHALATION) at 06:06

## 2018-06-29 RX ADMIN — DOCUSATE SODIUM SCH MG: 100 CAPSULE ORAL at 08:39

## 2018-06-29 RX ADMIN — CARVEDILOL SCH MCG: 25 TABLET, FILM COATED ORAL at 05:57

## 2018-06-29 RX ADMIN — LEVOTHYROXINE SODIUM SCH MCG: 75 TABLET ORAL at 05:56

## 2018-06-29 RX ADMIN — LINAGLIPTIN SCH MG: 5 TABLET, FILM COATED ORAL at 08:44

## 2018-06-29 RX ADMIN — MESALAMINE SCH MG: 250 CAPSULE ORAL at 08:39

## 2018-06-29 RX ADMIN — AMLODIPINE BESYLATE SCH MG: 5 TABLET ORAL at 08:39

## 2018-06-29 RX ADMIN — LIDOCAINE SCH EA: 50 PATCH CUTANEOUS at 08:44

## 2018-06-29 RX ADMIN — IPRATROPIUM BROMIDE AND ALBUTEROL SULFATE SCH ML: .5; 3 SOLUTION RESPIRATORY (INHALATION) at 02:25

## 2018-06-29 NOTE — DIAGNOSTIC IMAGING REPORT
INDICATION: Pneumonia.



Exam compared 03/13/2017.



FINDINGS: Airspace disease has developed in the right lower lobe

and at least partially involving the right middle lobe consistent

with pneumonia, however owing to the parenchyma density at the

site of infiltrate followup to confirm resolution appropriate.

The left lung is clear. There is no evidence for pleural fluid.



IMPRESSION: Infiltrate right lower lobe and partially involving

the middle lobe most compatible with pneumonia but warranting

radiographic followup to document clearance.



Dictated by: 



  Dictated on workstation # [id number]

## 2018-06-29 NOTE — DISCHARGE INST-SIMPLE/STANDARD
Discharge Inst-Standard


Discharge Medications


New, Converted or Re-Newed RX:  Transmitted to Pharmacy





Patient Instructions/Follow Up


Plan of Care/Instructions/FU:  


Fwup with me Monday or Tuesday


Activity as Tolerated:  Yes


Discharge Diet:  ADA Diet, Cardiac Diet











TIA MOURA DO Jun 29, 2018 11:11 am

## 2018-07-03 NOTE — PHYSICIAN QUERY-FINAL DX
GIOVANNI DONNELLY 7/3/18 1708:


Final Diagnosis


Give Final Diagnosis


Please give Final Diagnosis





TIA MOURA DO 7/10/18 1750:


Final Diagnosis


Give Final Diagnosis


See Discharge Summary











GIOVANNI DONNELLY Jul 3, 2018 17:08


TIA MOURA DO Jul 10, 2018 17:50

## 2018-07-10 NOTE — DISCHARGE SUMMARY
Diagnosis/Chief Complaint


Date of Admission


2018 at 14:30


Date of Discharge


2018 at 11:55


Discharge Date:  2018


Discharge Diagnosis


1.  Right Sided Community Acquired Pneumonia with failed outpatient treatment--

improved


2.  Hypertension--stable


3.  DMII--elevated BS during hospital stay due to infection


4.  Constipation--improved


5.  Urinary Retention--resolved


6.  RUQ pain--improved


7.  Right flank pain--improved





Reason Hospital Visit


This is a 63 year old female who had presented to my office the day prior to 

admission with complaint of severe right upper quadrant and right flank pain.  

She had been having intermittent pain off and on for the past few weeks.  She 

also complained of coughing spasms which exacerbated her pain and was having 

chills.  She was sent for a CT scan of her abdomen and pelvis and was found to 

have a right sided pneumonia.  She was given Rocephin IM and started on 

Zithromax but she presented back to my office today with no improvement in her 

symptoms so it was decided to admit her for IV antibiotics.





Discharge Summary


Hospital Course


Hospital Course


This is a 63 year old female who had presented to my office the day prior to 

admission with complaint of severe right upper quadrant and right flank pain.  

She had been having intermittent pain off and on for the past few weeks.  She 

also complained of coughing spasms which exacerbated her pain and was having 

chills.  She was sent for a CT scan of her abdomen and pelvis and was found to 

have a right sided pneumonia.  She was given Rocephin IM and started on 

Zithromax but she presented back to my office on the day of admisison with no 

improvement in her symptoms so it was decided to admit her for IV antibiotics.  

She was given zithromax and cefepime IV for the pneumonia.  She was started on 

SVNS with duoneb as well as incentive spirometry.  She was initially given IV 

fentanyl and IV toradol for pain.  This was then changed to oral oxycodone.  

She continued to complain of right upper quadrant and right flank pain until 

she was at least 48hrs on IV antibiotics then her pain started to improve.  She 

was afebrile and her WBC count was normal.  By the 3rd hospital day, her lung 

aeration was improved and her right sided pain was much improved.  However, she 

was having constipation with urinary retention.  She did have to be straight 

cathed and did receive a dulcolax suppository and once her bowels moved, her 

urinary retention resolved.  A repeat CXR showed improvement in her right sided 

infiltrate and it was decided she could be discharged home on oral antibiotics.


Procedures


None.





Discharge Physical Examination


Allergies:  


Coded Allergies:  


     Penicillins (Verified  Allergy, Unknown, 14)


     clarithromycin (Verified  Allergy, Unknown, 18)


 


 Patient as tolerated azithromycin


     gluten (Verified  Allergy, Unknown, 14)


     metoclopramide (Unverified  Allergy, Unknown, 3/30/16)


     piroxicam (Verified  Allergy, Unknown, Pt has received Ketorolac w/o issue

, 18)


     sulfacetamide sodium (Verified  Allergy, Unknown, 14)


General Appearance:  Alert, Oriented X3, No Acute Distress


Respiratory:  Clear to Auscultation


Cardiovascular:  Regular Rate


Abdominal:  Normal Bowel Sounds, Soft, No Tenderness


Neuro:  Normal Gait, Normal Speech


Psych/Mental Status:  Mental Status NL, Mood NL





Discharge


Home Medications


Reviewed and agree with Discharge Medication list on patient's Discharge 

Instruction sheet


Instructions to Patient/Family


Please see electronic discharge instructions given to patient.





Clinical Quality Measures


DVT/VTE Risk/Contraindication:


Risk Factor Score Per Nursin


RFS Level Per Nursing on Admit:  4+=Very High











TIA MOURA DO Jul 10, 2018 17:59

## 2018-07-23 ENCOUNTER — HOSPITAL ENCOUNTER (OUTPATIENT)
Dept: HOSPITAL 75 - RAD | Age: 63
End: 2018-07-23
Attending: FAMILY MEDICINE
Payer: COMMERCIAL

## 2018-07-23 DIAGNOSIS — J18.9: Primary | ICD-10-CM

## 2018-07-23 PROCEDURE — 71046 X-RAY EXAM CHEST 2 VIEWS: CPT

## 2018-07-23 NOTE — DIAGNOSTIC IMAGING REPORT
INDICATION: Pneumonia.



FINDINGS: Two views of the chest shows partial clearing of the

abnormal density seen in the right lower lobe. There still

remains some dense consolidation and/or mass effect posteriorly.

The left lung remains clear. There is no effusion or

pneumothorax.



IMPRESSION: There still remains an abnormal parenchymal density

in the right lower lobe with only slight improvement since

06/29/2018. Recommend continued followup with chest x-ray or this

could be evaluated further with CT.



Dictated by: 



  Dictated on workstation # [id number]

## 2018-07-25 ENCOUNTER — HOSPITAL ENCOUNTER (OUTPATIENT)
Dept: HOSPITAL 75 - RAD | Age: 63
End: 2018-07-25
Attending: FAMILY MEDICINE
Payer: COMMERCIAL

## 2018-07-25 DIAGNOSIS — J18.9: Primary | ICD-10-CM

## 2018-07-25 PROCEDURE — 71250 CT THORAX DX C-: CPT

## 2018-07-25 NOTE — DIAGNOSTIC IMAGING REPORT
INDICATION: Shortness of breath and right-sided chest pain.



TECHNIQUE: CT chest obtained without IV contrast.



COMPARISON: Comparison made to 04/22/2016.



FINDINGS: There are no enlarged mediastinal or hilar nodes. There

are no enlarged axillary nodes or chest wall masses. There is no

pleural or pericardial fluid. Bony windows demonstrate no

destructive bony lesion. There is a well-defined sclerotic bony

lesion of T12 on the right side which may be a bone island but is

nonspecific.



Visualized portions of the upper abdomen demonstrate fatty

infiltration of the liver and postop changes along the

gastrohepatic ligament but no mass lesion or abnormal fluid

collection.



Lung parenchymal windows demonstrate a large parenchymal density

with air bronchograms in the right lower lobe measuring about 6.9

x 6.1 cm. There are adjacent smaller areas of abnormal

parenchymal density in the right middle lobe and the anterior

portion of the right lower lobe. There is also a small area of

abnormal density and air bronchogram in the right upper lobe

anteriorly along the minor fissure. There is a small area of

scarring versus infiltrate in the right apex. The left lung shows

a minimal area of abnormal density in the superior segment of the

left lower lobe.



IMPRESSION: Abnormal parenchymal densities with air bronchograms

are seen, predominantly in the right lung involving the lower

lobe but there are smaller areas of abnormality in the middle

lobe and upper lobe as well. There is minimal area of abnormal

density in the left lung in the superior segment of the lower

lobe. While these findings may represent an inflammatory process,

the fact that the abnormality has persisted does raise the

possibility of a neoplastic process such as bronchoalveolar

carcinoma. Bronchoscopic evaluation with biopsy or CT-guided

biopsy could be considered, as clinically warranted. Note that

this is a new finding compared with 04/22/2016. There is diffuse

fatty infiltration of the liver. There is no adenopathy or

pleural fluid or pericardial fluid.



Dictated by: 



  Dictated on workstation # [id number]

## 2018-08-06 ENCOUNTER — APPOINTMENT (RX ONLY)
Dept: URBAN - METROPOLITAN AREA CLINIC 51 | Facility: CLINIC | Age: 63
Setting detail: DERMATOLOGY
End: 2018-08-06

## 2018-08-06 DIAGNOSIS — L81.4 OTHER MELANIN HYPERPIGMENTATION: ICD-10-CM

## 2018-08-06 DIAGNOSIS — L82.1 OTHER SEBORRHEIC KERATOSIS: ICD-10-CM

## 2018-08-06 DIAGNOSIS — D22 MELANOCYTIC NEVI: ICD-10-CM

## 2018-08-06 PROBLEM — D48.5 NEOPLASM OF UNCERTAIN BEHAVIOR OF SKIN: Status: ACTIVE | Noted: 2018-08-06

## 2018-08-06 PROCEDURE — 11300 SHAVE SKIN LESION 0.5 CM/<: CPT

## 2018-08-06 PROCEDURE — 11100: CPT | Mod: 59

## 2018-08-06 PROCEDURE — ? SHAVE REMOVAL

## 2018-08-06 PROCEDURE — ? COUNSELING

## 2018-08-06 PROCEDURE — ? LIQUID NITROGEN

## 2018-08-06 PROCEDURE — 17110 DESTRUCTION B9 LES UP TO 14: CPT

## 2018-08-06 PROCEDURE — ? BIOPSY BY SHAVE METHOD

## 2018-08-06 ASSESSMENT — LOCATION SIMPLE DESCRIPTION DERM
LOCATION SIMPLE: LEFT PRETIBIAL REGION
LOCATION SIMPLE: LEFT FOREARM
LOCATION SIMPLE: RIGHT UPPER BACK
LOCATION SIMPLE: RIGHT FOREARM
LOCATION SIMPLE: RIGHT SHOULDER
LOCATION SIMPLE: LEFT FOREHEAD
LOCATION SIMPLE: RIGHT PRETIBIAL REGION

## 2018-08-06 ASSESSMENT — LOCATION DETAILED DESCRIPTION DERM
LOCATION DETAILED: LEFT PROXIMAL DORSAL FOREARM
LOCATION DETAILED: RIGHT PROXIMAL DORSAL FOREARM
LOCATION DETAILED: LEFT PROXIMAL PRETIBIAL REGION
LOCATION DETAILED: LEFT FOREHEAD
LOCATION DETAILED: RIGHT INFERIOR UPPER BACK
LOCATION DETAILED: RIGHT PROXIMAL PRETIBIAL REGION
LOCATION DETAILED: RIGHT ANTERIOR SHOULDER
LOCATION DETAILED: LEFT VENTRAL PROXIMAL FOREARM
LOCATION DETAILED: RIGHT DISTAL DORSAL FOREARM

## 2018-08-06 ASSESSMENT — LOCATION ZONE DERM
LOCATION ZONE: LEG
LOCATION ZONE: ARM
LOCATION ZONE: FACE
LOCATION ZONE: TRUNK

## 2018-08-06 ASSESSMENT — PAIN INTENSITY VAS: HOW INTENSE IS YOUR PAIN 0 BEING NO PAIN, 10 BEING THE MOST SEVERE PAIN POSSIBLE?: NO PAIN

## 2018-08-06 NOTE — PROCEDURE: LIQUID NITROGEN
Include Z78.9 (Other Specified Conditions Influencing Health Status) As An Associated Diagnosis?: No
Medical Necessity Information: It is in your best interest to select a reason for this procedure from the list below. All of these items fulfill various CMS LCD requirements except the new and changing color options.
Bill Insurance (You Assume Risk Of Denial Or Audit By Selecting Yes): Yes
Consent: The patient's  verbal consent was obtained including but not limited to risks of crusting, scabbing, blistering, scarring, darker or lighter pigmentary change, recurrence, incomplete removal and infection.
Post-Care Instructions: I reviewed with the patient in detail post-care instructions. Patient is to wear sunprotection, and avoid picking at any of the treated lesions. Pt may apply Vaseline to crusted or scabbing areas.
Number Of Freeze-Thaw Cycles: 5 freeze-thaw cycles
Detail Level: Zone
Medical Necessity Clause: This procedure was medically necessary because the lesions that were treated were: Getting hit with combing
Duration Of Freeze Thaw-Cycle (Seconds): 5

## 2018-08-06 NOTE — PROCEDURE: SHAVE REMOVAL
Anesthesia Type: 1% lidocaine without epinephrine
Detail Level: Detailed
Render Post-Care Instructions In Note?: no
Path Notes (To The Dermatopathologist): Please check margins.
Hemostasis: Aluminum Chloride and Electrocautery
Was A Bandage Applied: Yes
Medical Necessity Information: It is in your best interest to select a reason for this procedure from the list below. All of these items fulfill various CMS LCD requirements except the new and changing color options.
Biopsy Method: Jacquelin salazar
Billing Type: Third-Party Bill
Anesthesia Volume In Cc: 1
Lab Facility: [id number]
Notification Instructions: Patient will be notified of biopsy results. However, patient instructed to call the office if not contacted within 2 weeks.
Lab: [id number]
X Size Of Lesion In Cm (Optional): 0
Post-Care Instructions: I reviewed with the patient in detail post-care instructions. Patient is to keep the biopsy site dry overnight, and then apply bacitracin twice daily until healed. Patient may apply hydrogen peroxide soaks to remove any crusting.
Medical Necessity Clause: This procedure was medically necessary because the lesion that was treated due to being irritated by clothing.
Wound Care: Aquaphor
Consent was obtained from the patient. The risks and benefits to therapy were discussed in detail. Specifically, the risks of infection, scarring, bleeding, prolonged wound healing, incomplete removal, allergy to anesthesia, nerve injury and recurrence were addressed. Prior to the procedure, the treatment site was clearly identified and confirmed by the patient. All components of Universal Protocol/PAUSE Rule completed.

## 2018-08-06 NOTE — PROCEDURE: BIOPSY BY SHAVE METHOD
Bill 22277 For Specimen Handling/Conveyance To Laboratory?: no
X Size Of Lesion In Cm: 0
Biopsy Method: Jacquelin salazar
Anesthesia Volume In Cc (Will Not Render If 0): 0.5
Silver Nitrate Text: The wound bed was treated with silver nitrate after the biopsy was performed.
Post-Care Instructions: I reviewed with the patient in detail post-care instructions. Patient is to keep the biopsy site dry overnight, and then apply bacitracin twice daily until healed. Patient may apply hydrogen peroxide soaks to remove any crusting.
Hemostasis: Electrocautery
Cryotherapy Text: The wound bed was treated with cryotherapy after the biopsy was performed.
Lab: [id number]
Electrodesiccation And Curettage Text: The wound bed was treated with electrodesiccation and curettage after the biopsy was performed.
Billing Type: Third-Party Bill
Consent: Verbal consent was obtained and risks were reviewed including but not limited to scarring, infection, bleeding, scabbing, incomplete removal, nerve damage and allergy to anesthesia.
Anesthesia Type: 1% lidocaine without epinephrine
Biopsy Type: H and E
Wound Care: Aquaphor
Additional Anesthesia Volume In Cc (Will Not Render If 0): 1
Curettage Text: The wound bed was treated with curettage after the biopsy was performed.
Notification Instructions: Patient will be notified of biopsy results. However, patient instructed to call the office if not contacted within 2 weeks.
Lab Facility: [id number]
Type Of Destruction Used: Electrodesiccation
Was A Bandage Applied: Yes
Dressing: no dressing applied
Detail Level: Detailed
Electrodesiccation Text: The wound bed was treated with electrodesiccation after the biopsy was performed.
Depth Of Biopsy: dermis

## 2018-11-08 ENCOUNTER — HOSPITAL ENCOUNTER (OUTPATIENT)
Dept: HOSPITAL 75 - RAD | Age: 63
End: 2018-11-08
Attending: NURSE PRACTITIONER
Payer: COMMERCIAL

## 2018-11-08 VITALS — BODY MASS INDEX: 29.41 KG/M2 | WEIGHT: 183 LBS | HEIGHT: 66 IN

## 2018-11-08 DIAGNOSIS — R88.8: Primary | ICD-10-CM

## 2018-11-08 LAB
APPEARANCE CSF: CLEAR
COLOR CSF: COLORLESS
GLUCOSE CSF-MCNC: 96 MG/DL (ref 50–80)
LYMPHOCYTES NFR CSF MANUAL: (no result) %
PROT CSF-MCNC: 28 MG/DL (ref 15–40)
TUBE # CSF: 3

## 2018-11-08 PROCEDURE — 87206 SMEAR FLUORESCENT/ACID STAI: CPT

## 2018-11-08 PROCEDURE — 87205 SMEAR GRAM STAIN: CPT

## 2018-11-08 PROCEDURE — 82945 GLUCOSE OTHER FLUID: CPT

## 2018-11-08 PROCEDURE — 89051 BODY FLUID CELL COUNT: CPT

## 2018-11-08 PROCEDURE — 36415 COLL VENOUS BLD VENIPUNCTURE: CPT

## 2018-11-08 PROCEDURE — 77002 NEEDLE LOCALIZATION BY XRAY: CPT

## 2018-11-08 PROCEDURE — 87210 SMEAR WET MOUNT SALINE/INK: CPT

## 2018-11-08 PROCEDURE — 62270 DX LMBR SPI PNXR: CPT

## 2018-11-08 PROCEDURE — 87070 CULTURE OTHR SPECIMN AEROBIC: CPT

## 2018-11-08 PROCEDURE — 87101 SKIN FUNGI CULTURE: CPT

## 2018-11-08 PROCEDURE — 84157 ASSAY OF PROTEIN OTHER: CPT

## 2018-11-08 PROCEDURE — 87116 MYCOBACTERIA CULTURE: CPT

## 2018-11-08 NOTE — DIAGNOSTIC IMAGING REPORT
Indication: Possible meningitis.



Patient was brought to the procedure room and placed in the prone

position. The skin of the low back was prepped and draped in the

usual sterile fashion. Small amount of 1% lidocaine was utilized

for local anesthesia. A 22-gauge spinal needle was advanced into

the lumbar thecal sac at the L3-4 level. Approximately 6 cc of

clear CSF was removed. Needle was withdrawn, hemostasis was

obtained. Patient tolerated the procedure well. 25 seconds of

fluoroscopy was utilized.



Impression: Successful fluoroscopically-assisted lumbar puncture,

as described.



Dictated by: 



  Dictated on workstation # [id number]

## 2019-02-09 ENCOUNTER — HOSPITAL ENCOUNTER (EMERGENCY)
Dept: HOSPITAL 75 - ER | Age: 64
Discharge: HOME | End: 2019-02-09
Payer: COMMERCIAL

## 2019-02-09 VITALS — SYSTOLIC BLOOD PRESSURE: 142 MMHG | DIASTOLIC BLOOD PRESSURE: 78 MMHG

## 2019-02-09 VITALS — HEIGHT: 66 IN | WEIGHT: 182 LBS | BODY MASS INDEX: 29.25 KG/M2

## 2019-02-09 DIAGNOSIS — Z88.8: ICD-10-CM

## 2019-02-09 DIAGNOSIS — Z90.710: ICD-10-CM

## 2019-02-09 DIAGNOSIS — I25.2: ICD-10-CM

## 2019-02-09 DIAGNOSIS — I25.10: ICD-10-CM

## 2019-02-09 DIAGNOSIS — Z82.49: ICD-10-CM

## 2019-02-09 DIAGNOSIS — Z90.89: ICD-10-CM

## 2019-02-09 DIAGNOSIS — Z86.19: ICD-10-CM

## 2019-02-09 DIAGNOSIS — E03.9: ICD-10-CM

## 2019-02-09 DIAGNOSIS — Z88.1: ICD-10-CM

## 2019-02-09 DIAGNOSIS — Z88.0: ICD-10-CM

## 2019-02-09 DIAGNOSIS — Z95.5: ICD-10-CM

## 2019-02-09 DIAGNOSIS — Z87.19: ICD-10-CM

## 2019-02-09 DIAGNOSIS — Z86.010: ICD-10-CM

## 2019-02-09 DIAGNOSIS — Z88.2: ICD-10-CM

## 2019-02-09 DIAGNOSIS — Z90.49: ICD-10-CM

## 2019-02-09 DIAGNOSIS — M32.9: ICD-10-CM

## 2019-02-09 DIAGNOSIS — Z98.890: ICD-10-CM

## 2019-02-09 DIAGNOSIS — Z87.448: ICD-10-CM

## 2019-02-09 DIAGNOSIS — J45.909: ICD-10-CM

## 2019-02-09 DIAGNOSIS — Z87.440: ICD-10-CM

## 2019-02-09 DIAGNOSIS — B36.9: Primary | ICD-10-CM

## 2019-02-09 DIAGNOSIS — Z79.51: ICD-10-CM

## 2019-02-09 DIAGNOSIS — K21.9: ICD-10-CM

## 2019-02-09 LAB
ALBUMIN SERPL-MCNC: 4.3 GM/DL (ref 3.2–4.5)
ALP SERPL-CCNC: 73 U/L (ref 40–136)
ALT SERPL-CCNC: 18 U/L (ref 0–55)
BASOPHILS # BLD AUTO: 0 10^3/UL (ref 0–0.1)
BASOPHILS NFR BLD AUTO: 0 % (ref 0–10)
BILIRUB SERPL-MCNC: 0.2 MG/DL (ref 0.1–1)
BUN/CREAT SERPL: 15
CALCIUM SERPL-MCNC: 9 MG/DL (ref 8.5–10.1)
CHLORIDE SERPL-SCNC: 101 MMOL/L (ref 98–107)
CO2 SERPL-SCNC: 20 MMOL/L (ref 21–32)
CREAT SERPL-MCNC: 0.8 MG/DL (ref 0.6–1.3)
EOSINOPHIL # BLD AUTO: 0 10^3/UL (ref 0–0.3)
EOSINOPHIL NFR BLD AUTO: 1 % (ref 0–10)
ERYTHROCYTE [DISTWIDTH] IN BLOOD BY AUTOMATED COUNT: 14.3 % (ref 10–14.5)
GFR SERPLBLD BASED ON 1.73 SQ M-ARVRAT: > 60 ML/MIN
GLUCOSE SERPL-MCNC: 210 MG/DL (ref 70–105)
HCT VFR BLD CALC: 38 % (ref 35–52)
HGB BLD-MCNC: 13.3 G/DL (ref 11.5–16)
INR PPP: 1 (ref 0.8–1.4)
LYMPHOCYTES # BLD AUTO: 1.6 X 10^3 (ref 1–4)
LYMPHOCYTES NFR BLD AUTO: 21 % (ref 12–44)
MAGNESIUM SERPL-MCNC: 2.1 MG/DL (ref 1.8–2.4)
MANUAL DIFFERENTIAL PERFORMED BLD QL: NO
MCH RBC QN AUTO: 32 PG (ref 25–34)
MCHC RBC AUTO-ENTMCNC: 35 G/DL (ref 32–36)
MCV RBC AUTO: 92 FL (ref 80–99)
MONOCYTES # BLD AUTO: 0.4 X 10^3 (ref 0–1)
MONOCYTES NFR BLD AUTO: 5 % (ref 0–12)
MYOGLOBIN SERPL-MCNC: 22 NG/ML (ref 10–92)
NEUTROPHILS # BLD AUTO: 5.6 X 10^3 (ref 1.8–7.8)
NEUTROPHILS NFR BLD AUTO: 73 % (ref 42–75)
PLATELET # BLD: 344 10^3/UL (ref 130–400)
PMV BLD AUTO: 9.3 FL (ref 7.4–10.4)
POTASSIUM SERPL-SCNC: 4.2 MMOL/L (ref 3.6–5)
PROT SERPL-MCNC: 7.7 GM/DL (ref 6.4–8.2)
PROTHROMBIN TIME: 12.6 SEC (ref 12.2–14.7)
SODIUM SERPL-SCNC: 135 MMOL/L (ref 135–145)
WBC # BLD AUTO: 7.7 10^3/UL (ref 4.3–11)

## 2019-02-09 PROCEDURE — 85025 COMPLETE CBC W/AUTO DIFF WBC: CPT

## 2019-02-09 PROCEDURE — 84484 ASSAY OF TROPONIN QUANT: CPT

## 2019-02-09 PROCEDURE — 85379 FIBRIN DEGRADATION QUANT: CPT

## 2019-02-09 PROCEDURE — 85610 PROTHROMBIN TIME: CPT

## 2019-02-09 PROCEDURE — 83735 ASSAY OF MAGNESIUM: CPT

## 2019-02-09 PROCEDURE — 83874 ASSAY OF MYOGLOBIN: CPT

## 2019-02-09 PROCEDURE — 83880 ASSAY OF NATRIURETIC PEPTIDE: CPT

## 2019-02-09 PROCEDURE — 93005 ELECTROCARDIOGRAM TRACING: CPT

## 2019-02-09 PROCEDURE — 36415 COLL VENOUS BLD VENIPUNCTURE: CPT

## 2019-02-09 PROCEDURE — 71046 X-RAY EXAM CHEST 2 VIEWS: CPT

## 2019-02-09 PROCEDURE — 93041 RHYTHM ECG TRACING: CPT

## 2019-02-09 PROCEDURE — 80053 COMPREHEN METABOLIC PANEL: CPT

## 2019-02-09 NOTE — XMS REPORT
Continuity of Care Document

 Created on: 2019



DOMINGO CONRAD

External Reference #: [id number]

: 1955

Sex: Female



Demographics







 Address  [address] [place]  [address]

 

 Home Phone  [phone]

 

 Preferred Language  Unknown

 

 Marital Status  Unknown

 

 Yarsanism Affiliation  Unknown

 

 Race  Unknown

 

 Ethnic Group  Unknown





Author







 Author  [facility]

 

 Organization  [facility]

 

 Address  Unknown

 

 Phone  Unavailable



              



Allergies

      





 Active            Description            Code            Type            
Severity            Reaction            Onset            Reported/Identified   
         Relationship to Patient            Clinical Status        

 

 Yes            Penicillins            [id number]            Drug Allergy      
      Unknown            N/A                         2014                
                  

 

 Yes            piroxicam            [id number]            Drug Allergy        
    Unknown            N/A                         2014                  
                

 

 Yes            sulfacetamide sodium            [id number]            Drug 
Allergy            Unknown            N/A                         2014   
                               

 

 Yes            gluten            [id number]            Drug Allergy            
Unknown            N/A                         2014                      
            

 

 Yes            metoclopramide            [id number]            Drug Allergy   
         Unknown            N/A                         2016             
                     

 

 Yes            piroxicam            [id number]            Drug Allergy        
    Unknown            Pt has received                         2018      
                            

 

 Yes            clarithromycin            [id number]            Drug Allergy   
         Unknown            N/A                         2018             
                     



                              



Medications

      



There is no data.                  



Problems

      





 Date Dx Coded            Attending            Type            Code            
Diagnosis            Diagnosed By        

 

 2012                         Ot            780.4            DIZZINESS 
AND GIDDINESS                     

 

 2013            ZULEIKA SLATER DO S            Ot            244.9 
           HYPOTHYROIDISM NOS                     

 

 2013            ZULEIKA SLATER DO S            Ot            266.2 
           B-COMPLEX DEFIC NEC                     

 

 2013            ZULEIKA SLATER DO S            Ot            272.4 
           HYPERLIPIDEMIA NEC/NOS                     

 

 2013            ZULEIKA SLATER DO S            Ot            277.7 
           DYSMETABOLIC SYNDROME X                     

 

 2013            ZULEIKA SLATER DO S            Ot            300.00
            ANXIETY STATE NOS                     

 

 2013            ZULEIKA SLATER DO S            Ot            401.9 
           HYPERTENSION NOS                     

 

 2013            ZULEIKA SLATER DO S            Ot            486   
         PNEUMONIA, ORGANISM NOS                     

 

 2013            ZULEIKA SLATER DO S            Ot            530.81
            ESOPHAGEAL REFLUX                     

 

 2013            ZULEIKA SLATER DO S            Ot            562.11
            DIVERTICULITIS COLON (W/O MENT OF HEMORR                     

 

 2013            ZULEIKA SLATER DO S            Ot            710.0 
           SYST LUPUS ERYTHEMATOSIS                     

 

 2013            CLARICE SLATER DOQUELINE S            Ot            715.90
            OSTEOARTHROS NOS-UNSPEC                     

 

 2013            MARTELL TY ZULEIKA S            Ot            720.0 
           ANKYLOSING SPONDYLITIS                     

 

 2013            MARTELL TY ZULEIKA S            Ot            780.52
            INSOMNIA, UNSPECIFIED                     

 

 2013            MARTELL TY ZULEIKA S            Ot            V07.4 
           HORMONE REPLACEMENT THERAPY (POSTMENOPAU                     

 

 2013            MARTELL TY ZULEIKA S            Ot            V58.69
            OTH MED,LT,CURRENT USE                     

 

 2013            MARTELL TY ZULEIKA S            Ot            244.9 
           HYPOTHYROIDISM NOS                     

 

 2013            MARTELL TY ZULEIKA S            Ot            250.00
            DIAB NEIL WO COMPL, TYPE II OR UNSPEC TY                     

 

 2013            MARTELL TY ZULEIKA S            Ot            266.2 
           B-COMPLEX DEFIC NEC                     

 

 2013            MARTELL TY ZULEIKA S            Ot            276.8 
           HYPOPOTASSEMIA                     

 

 2013            MARTELL TY ZULEIKA S            Ot            300.00
            ANXIETY STATE NOS                     

 

 2013            MARTELL TY ZULEIKA S            Ot            401.9 
           HYPERTENSION NOS                     

 

 2013            MARTELL TY ZULEIKA S            Ot            560.9 
           INTESTINAL OBSTRUCT NOS                     

 

 2013            MARTELL TY ZULEIKA S            Ot            710.0 
           SYST LUPUS ERYTHEMATOSIS                     

 

 2013            MARTELL TY ZULEIKA S            Ot            790.5 
           ABN SERUM ENZY LEVEL NEC                     

 

 2013            MARTELL TY ZULEIKA S            Ot            V12.79
            PERSONAL HISTORY OTH SPEC DIGESTIVE SYST                     

 

 2014            ADAM SLATER DOLINE S            Ot            244.9 
           HYPOTHYROIDISM NOS                     

 

 2014            MARTELL TY ZULEIKA S            Ot            245.2 
           CHR LYMPHOCYT THYROIDIT                     

 

 2014            MARTELL TY ZULEIKA S            Ot            251.1 
           HYPOGLYCEMIA NEC                     

 

 2014            MARTELL TY ZULEIKA S            Ot            266.2 
           B-COMPLEX DEFIC NEC                     

 

 2014            MARTELL TY ZULEIKA S            Ot            272.4 
           HYPERLIPIDEMIA NEC/NOS                     

 

 2014            ROBINSONNDADAM MENDOZA DOLINE S            Ot            401.9 
           HYPERTENSION NOS                     

 

 2014            ADAM SLATER DOLINE S            Ot            530.81
            ESOPHAGEAL REFLUX                     

 

 2014            ZULEIKA SLATER DO S            Ot            560.1 
           PARALYTIC ILEUS                     

 

 2014            ZULEIKA SLATER DO S            Ot            562.10
            DIVERTICULOSIS COLON (W/O MENT OF HEMORR                     

 

 2014            ZULEIKA SLATER DO S            Ot            710.0 
           SYST LUPUS ERYTHEMATOSIS                     

 

 2014            ZULEIKA SLATER DO S            Ot            720.0 
           ANKYLOSING SPONDYLITIS                     

 

 2014            ZULEIKA SLATER DO S            Ot            787.02
            NAUSEA ALONE                     

 

 2014            ZULEIKA SLATER DO S            Ot            789.00
            ABDOMINAL PAIN, UNSPECIFIED SITE                     

 

 2014            ZULEIKA SLATER DO S            Ot            790.6 
           ABN BLOOD CHEMISTRY NEC                     

 

 2014            ZULEIKA SLATER DO S            Ot            244.9 
           HYPOTHYROIDISM NOS                     

 

 2014            ADAM SLATER DOLINE S            Ot            276.69
            OTHER FLUID OVERLOAD                     

 

 2014            ZULEIKA SLATER DO S            Ot            298.9 
           PSYCHOSIS NOS                     

 

 2014            ZULEIKA SLATER DO S            Ot            346.90
            MIGRAINE UNSPECIFIED W/O INTRACT MGRN W/                     

 

 2014            ZULEIKA SLATER DO S            Ot            493.90
            ASTHMA, UNSPECIFIED                     

 

 2014            ZULEIKA SLATER DO S            Ot            518.0 
           PULMONARY COLLAPSE                     

 

 2014            ZULEIKA SLATER DO S            Ot            530.81
            ESOPHAGEAL REFLUX                     

 

 2014            ZULEIKA SLATER DO S            Ot            553.3 
           DIAPHRAGMATIC HERNIA                     

 

 2014            ZULEIKA SLATER DO S            Ot            555.9 
           REGIONAL ENTERITIS NOS                     

 

 2014            ZULEIKA SLATER DO S            Ot            562.11
            DIVERTICULITIS COLON (W/O MENT OF HEMORR                     

 

 2014            ZULEIKA SLATER DO S            Ot            564.00
            UNSPEC CONSTIPATION                     

 

 2014            ZULEIKA SLATER DO S            Ot            564.1 
           IRRITABLE BOWEL SYNDROME                     

 

 2014            ZULEIKA SLATER DO S            Ot            710.0 
           SYST LUPUS ERYTHEMATOSIS                     

 

 2014            ZULEIKA SLATER DO S            Ot            714.9 
           INFLAMM POLYARTHROP NOS                     

 

 2014            ZULEIKA SLATER DO S            Ot            722.10
            LUMBAR DISC DISPLACEMENT                     

 

 2014            ZULEIKA SLATER DO S            Ot            722.52
            LUMB/LUMBOSAC DISC DEGEN                     

 

 2014            ZULEIKA SLATER DO S            Ot            733.90
            BONE   CARTILAGE DIS NOS                     

 

 2014            ZULEIKA SLATER DO S            Ot            760.9 
           MATERNAL COND NOS AFF NB                     

 

 2014            ZULEIKA SLATER DO S            Ot            789.01
            ABDOMINAL PAIN, RIGHT UPPER QUADRANT                     

 

 2014            ZULEIKA SLATER DO S            Ot            799.02
            HYPOXEMIA                     

 

 2014            ZULEIKA SLATER DO S            Ot            E932.0
            ADV EFF CORTICOSTEROIDS                     

 

 2014            ZULEIKA SLATER DO S            Ot            E935.2
            ADV EFF OPIATES                     

 

 10/22/2014            ZULEIKA SLATER DO S            Ot            244.9 
           HYPOTHYROIDISM NOS                     

 

 10/22/2014            ZULEIKA SLATER DO S            Ot            272.4 
           HYPERLIPIDEMIA NEC/NOS                     

 

 10/22/2014            ZULEIKA SLATER DO S            Ot            276.8 
           HYPOPOTASSEMIA                     

 

 10/22/2014            ZULEIKA SLATER DO S            Ot            401.9 
           HYPERTENSION NOS                     

 

 10/22/2014            ZULEIKA SLATER DO S            Ot            530.81
            ESOPHAGEAL REFLUX                     

 

 10/22/2014            ZULEIKA SLATER DO S            Ot            558.9 
           NONINF GASTROENTERIT NEC                     

 

 10/22/2014            ZULEIKA SLATER DO S            Ot            564.00
            UNSPEC CONSTIPATION                     

 

 10/22/2014            ZULEIKA SLATER DO S            Ot            599.0 
           URIN TRACT INFECTION NOS                     

 

 10/22/2014            ZULEIKA SLATER DO S            Ot            710.0 
           SYST LUPUS ERYTHEMATOSIS                     

 

 10/22/2014            ADAM SLATER DOLINE S            Ot            720.0 
           ANKYLOSING SPONDYLITIS                     

 

 2015            ZULEIKA SLATER DO S            Ot            562.10
                                  

 

 2015            ZULEIKA SLATER DO S            Ot            787.01
                                  

 

 2015            ZULEIKA SLATER DO S            Ot            789.00
                                  

 

 2015            ZULEIKA SLATER DO S            Ot            789.00
                                  

 

 2015            ZULEIKA SLATER DO S            Ot            790.6 
                                 

 

 2015            ZULEIKA SLATER DO S            Ot            789.00
                                  

 

 2015            CLARICE SLATER DOQUELINE S            Ot            571.8 
                                 

 

 2015            ORENDER DO, ZULEIKA S            Ot            593.9 
                                 

 

 2015            ROBINSONNDER DO, ZULEIKA S            Ot            789.01
                                  

 

 2015            ROBINSONNDER DO, ZULEIKA S            Ot            571.8 
                                 

 

 2015            ROBINSONNDER DO, ZULEIKA S            Ot            593.9 
                                 

 

 2015            DL HARRIS FACC, ALI FACP CCDS            Ot            
401.9                                  

 

 2015            DL HARRIS FACC, ALI FACP CCDS            Ot            
424.0                                  

 

 2015            DL HARRIS FACC, ALI FACP CCDS            Ot            
786.09                                  

 

 2015            DL HARRIS FACC, ALI FACP CCDS            Ot            
786.50                                  

 

 2015            DL AHNC, ALI FACP CCDS            Ot            
794.31                                  

 

 2015            DL HARRIS FACC, ALI FACP CCDS            Ot            
244.9                                  

 

 2015            DL HARRIS FACC, ALI FACP CCDS            Ot            
401.9                                  

 

 2015            DL HARRIS FACC, ALI FACP CCDS            Ot            
424.0                                  

 

 2015            DL HARRIS FACC, ALI FACP CCDS            Ot            
593.9                                  

 

 2015            DL HARRIS FACC, ALI FACP CCDS            Ot            
786.09                                  

 

 2015            DL AHNC, ALI FACP CCDS            Ot            
786.50                                  

 

 2015            DL AHNC, ALI FACP CCDS            Ot            
794.31                                  

 

 2015            MARTY GILMORE ARNP            Ot            
562.10                                  

 

 2015            MARTY GILMORE ARNP            Ot            
571.8                                  

 

 2015            MARTY GILMORE ARNP            Ot            
789.1                                  

 

 2015            DL AHNC, ALI FACP CCDS            Ot            
E78.5            HYPERLIPIDEMIA, UNSPECIFIED                     

 

 2015            DL HARRIS FACC, ALI FACP CCDS            Ot            
E89.0            POSTPROCEDURAL HYPOTHYROIDISM                     

 

 2015            DL HARRIS FACC, ALI FACP CCDS            Ot            
I10            ESSENTIAL (PRIMARY) HYPERTENSION                     

 

 2015            DL AHNC, ALI FACP CCDS            Ot            
R07.89            OTHER CHEST PAIN                     

 

 2015            DL HARRIS FACC, ALI FACP CCDS            Ot            
Z79.899            OTHER LONG TERM (CURRENT) DRUG THERAPY                     

 

 2016            ORENDER DO, ZULEIKA S            Ot            562.10
                                  

 

 2016            ORENDER DO, ZULEIKA S            Ot            787.01
                                  

 

 2016            ORENDER DO, ZULEIKA S            Ot            789.00
                                  

 

 2016            ORENDER DO, ZULEIKA S            Ot            789.00
                                  

 

 2016            ORENDER DO, ZULEIKA S            Ot            790.6 
                                 

 

 2016            ORENDER DO, ZULEIKA S            Ot            789.00
                                  

 

 2016            ORENDER DO, ZULEIKA S            Ot            571.8 
                                 

 

 2016            ORENDER DO, ZULEIKA S            Ot            593.9 
                                 

 

 2016            ORENDER DO, ZULEIKA S            Ot            789.01
                                  

 

 2016            ORENDER DO, ZULEIKA S            Ot            571.8 
                                 

 

 2016            ORENDER DO, ZULEIKA S            Ot            593.9 
                                 

 

 2016            DL HARRIS FACC, ALI FACP CCDS            Ot            
401.9                                  

 

 2016            DL HARRIS FAC, ALI FACP CCDS            Ot            
424.0                                  

 

 2016            DL HARRIS FAC, ALI FACP CCDS            Ot            
786.09                                  

 

 2016            DL HARRIS FAC, ALI FACP CCDS            Ot            
786.50                                  

 

 2016            DL HARRIS FACC, ALI FACP CCDS            Ot            
794.31                                  

 

 2016            DL HARRIS FACC, ALI FACP CCDS            Ot            
244.9                                  

 

 2016            DL HARRIS FACC, ALI FACP CCDS            Ot            
401.9                                  

 

 2016            DL HARRIS FACC, ALI FACP CCDS            Ot            
424.0                                  

 

 2016            DL HARRIS FACC, ALI FACP CCDS            Ot            
593.9                                  

 

 2016            DL HARRIS FACC, ALI FACP CCDS            Ot            
786.09                                  

 

 2016            DL HARRIS FACC, ALI FACP CCDS            Ot            
786.50                                  

 

 2016            DL HARRIS FACC, ALI FACP CCDS            Ot            
794.31                                  

 

 2016            MARTY GILMORE ARNSUSAN            Ot            
562.10                                  

 

 2016            MARTY GILMORE ARNP            Ot            
571.8                                  

 

 2016            MARTY GILMORE ARNP            Ot            
789.1                                  

 

 03/10/2016            ORENDER DO, ZULEIKA S            Ot            R10.11
                                  

 

 03/10/2016            ORENDER DO, ZULEIKA S            Ot            R10.31
                                  

 

 03/10/2016            ORENDER DO, ZULEIKA S            Ot            R10.32
                                  

 

 03/10/2016            ORENDER DO, ZULEIKA S            Ot            
Z85.528                                  

 

 03/15/2016            MICHAEL WILSON N APRN            Ot            R05     
                             

 

 03/15/2016            MICHAEL WILSON APRN            Ot            R09.89  
                                

 

 2016            MICHAEL WILSON APRN            Ot            R05     
                             

 

 2016            MICHAEL WILSON APRN            Ot            R09.89  
                                

 

 2016            ORENDER DO, ZULEIKA S            Ot            562.10
                                  

 

 2016            ORENDER DO, ZULEIKA S            Ot            787.01
                                  

 

 2016            ORENDER DO, ZULEIKA S            Ot            789.00
                                  

 

 2016            ORENDER DO, ZULEIKA S            Ot            789.00
                                  

 

 2016            ORENDER DO, ZULEIKA S            Ot            790.6 
                                 

 

 2016            ORENDER DO, ZULEIKA S            Ot            789.00
                                  

 

 2016            ORENDER DO, ZULEIKA S            Ot            571.8 
                                 

 

 2016            ORENDER DO, ZULEIKA S            Ot            593.9 
                                 

 

 2016            ORENDER DO, ZULEIKA S            Ot            789.01
                                  

 

 2016            ORENDER DO, ZULEIKA S            Ot            571.8 
                                 

 

 2016            ORENDER DO, ZULEIKA S            Ot            593.9 
                                 

 

 2016            DL HARRIS FAC, PALAK FACP CCDS            Ot            
401.9                                  

 

 2016            DL AHN, PALAK FACP CCDS            Ot            
424.0                                  

 

 2016            DL HARRIS FACC, ALI FACP CCDS            Ot            
786.09                                  

 

 2016            DL AHNC, PALAK FACP CCDS            Ot            
786.50                                  

 

 2016            DL AHNC, ALI FACP CCDS            Ot            
794.31                                  

 

 2016            DL HARRSI FACC, ALI FACP CCDS            Ot            
244.9                                  

 

 2016            DL HARRIS [facility], ALI FACP CCDS            Ot            
401.9                                  

 

 2016            DL HARRIS FAC, ALI FACP CCDS            Ot            
424.0                                  

 

 2016            DL HARRIS FAC, ALI FACP CCDS            Ot            
593.9                                  

 

 2016            DL HARRIS [facility], ALI FACP CCDS            Ot            
786.09                                  

 

 2016            DL HARRIS [facility], ALI FACP CCDS            Ot            
786.50                                  

 

 2016            DL HARRIS [facility], ALI FACP CCDS            Ot            
794.31                                  

 

 2016            ENRIQUETAMELISA MARTY DAVIDSON ARNP            Ot            
562.10                                  

 

 2016            ENRIQUETAMELISA MARTY M ARNP            Ot            
571.8                                  

 

 2016            MANDO MARTY DAVIDSON ARNP            Ot            
789.1                                  

 

 2016            ZULEIKA SLATER DO S            Ot            R10.11
                                  

 

 2016            ZULEIKA SLATER DO S            Ot            R10.31
                                  

 

 2016            ZULEIKA SLATER DO S            Ot            R10.32
                                  

 

 2016            ZULEIKA SLATER DO S            Ot            
Z85.528                                  

 

 2016            MICHAEL WILSON APRN            Ot            R05     
                             

 

 2016            MICHAEL WILSON APRN            Ot            R09.89  
                                

 

 2016            ZULEIKA SLATER DO            Ot            I10   
         ESSENTIAL (PRIMARY) HYPERTENSION                     

 

 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
            ATHSCL HEART DISEASE OF NATIVE CORONARY                      

 

 2016            ZULEIKA SLATER DO            Ot            I25.2 
           OLD MYOCARDIAL INFARCTION                     

 

 2016            ZULEIKA SLATER DO S            Ot            K21.9 
           GASTRO-ESOPHAGEAL REFLUX DISEASE WITHOUT                     

 

 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                      

 

 2016            ZULEIKA SLATER DO S            Ot            K63.89
            OTHER SPECIFIED DISEASES OF INTESTINE                     

 

 2016            ZULEIKA SLATER DO S            Ot            K64.8 
           OTHER HEMORRHOIDS                     

 

 2016            ZULEIKA SLATER DO            Ot            
Z86.010            PERSONAL HISTORY OF COLONIC POLYPS                     

 

 2016            [facility]            Ot            I10   
                               

 

 2016            [facility]            Ot            I25.10
                                  

 

 2016            [facility] DO, ZULEIKA S            Ot            I25.2 
                                 

 

 2016            [facility]            Ot            K21.9 
                                 

 

 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
                                  

 

 2016            [facility], ZULEIKA S            Ot            K63.89
                                  

 

 2016            [facility] DO, ZULEIKA S            Ot            K64.8 
                                 

 

 2016            [facility] DO, ZULEIKA S            Ot            
Z86.010                                  

 

 2016            [facility] DO, ZULEIKA S            Ot            R06.00
            DYSPNEA, UNSPECIFIED                     

 

 2016            [facility] DO, ZULEIKA S            Ot            R07.9 
           CHEST PAIN, UNSPECIFIED                     

 

 2016            [facility]            Ot            R06.00
            DYSPNEA, UNSPECIFIED                     

 

 2016            [facility]            Ot            R07.9 
           CHEST PAIN, UNSPECIFIED                     

 

 2016            [facility]            Ot            R06.00
            DYSPNEA, UNSPECIFIED                     

 

 2016            [facility]            Ot            R07.9 
           CHEST PAIN, UNSPECIFIED                     

 

 2016            [facility] DO, ZULEIKA S            Ot            R06.00
            DYSPNEA, UNSPECIFIED                     

 

 2016            [facility]            Ot            R07.9 
           CHEST PAIN, UNSPECIFIED                     

 

 2016            [facility] DO, ZULEIKA S            Ot            I10   
         ESSENTIAL (PRIMARY) HYPERTENSION                     

 

 2016            [facility] DO, ZULEIKA S            Ot            I25.10
            ATHSCL HEART DISEASE OF NATIVE CORONARY                      

 

 2016            [facility]            Ot            I25.2 
           OLD MYOCARDIAL INFARCTION                     

 

 2016            [facility] DO, ZULEIKA S            Ot            K21.9 
           GASTRO-ESOPHAGEAL REFLUX DISEASE WITHOUT                     

 

 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                      

 

 2016            [facility] DOCLARICEZULEIKA S            Ot            K63.89
            OTHER SPECIFIED DISEASES OF INTESTINE                     

 

 2016            ZULEIKA SLATER DO S            Ot            K64.8 
           OTHER HEMORRHOIDS                     

 

 2016            ZULEIKA SLATER DO S            Ot            
Z86.010            PERSONAL HISTORY OF COLONIC POLYPS                     

 

 10/17/2016            ZULEIKA SLATER DO            Ot            K76.0 
           FATTY (CHANGE OF) LIVER, NOT ELSEWHERE C                     

 

 10/20/2016            ADAM SLATER DOLINE S            Ot            562.10
            DIVERTICULOSIS COLON (W/O MENT OF HEMORR                     

 

 10/20/2016            ADAM SLATER DOLINE S            Ot            787.01
            NAUSEA WITH VOMITING                     

 

 10/20/2016            ADAM SLATER DOLINE S            Ot            789.00
            ABDOMINAL PAIN, UNSPECIFIED SITE                     

 

 10/20/2016            ADAM SLATER DOLINE S            Ot            789.00
            ABDOMINAL PAIN, UNSPECIFIED SITE                     

 

 10/20/2016            ADAM SLATER DOLINE S            Ot            790.6 
           ABN BLOOD CHEMISTRY NEC                     

 

 10/20/2016            ADAM SLATER DOLINE S            Ot            789.00
            ABDOMINAL PAIN, UNSPECIFIED SITE                     

 

 10/20/2016            ADAM SLATER DOLINE S            Ot            571.8 
           CHRONIC LIVER DIS NEC                     

 

 10/20/2016            ADAM SLATER DOLINE S            Ot            593.9 
           RENAL   URETERAL DIS NOS                     

 

 10/20/2016            ADAM SLATER DOLINE S            Ot            789.01
            ABDOMINAL PAIN, RIGHT UPPER QUADRANT                     

 

 10/20/2016            ADAM SLATER DOLINE S            Ot            571.8 
           CHRONIC LIVER DIS NEC                     

 

 10/20/2016            ADAM SLATER DOLINE S            Ot            593.9 
           RENAL   URETERAL DIS NOS                     

 

 10/20/2016            DL HARRIS FACC, PALAK FACP CCDS            Ot            
401.9            HYPERTENSION NOS                     

 

 10/20/2016            DL HARRIS FACC, PALAK FACP CCDS            Ot            
424.0            MITRAL VALVE DISORDER                     

 

 10/20/2016            DL HARRIS FACC, [place]AK FACP CCDS            Ot            
786.09            RESPIRATORY ABNORM NEC                     

 

 10/20/2016            DL HARRIS FACC, PALAK FACP CCDS            Ot            
786.50            CHEST PAIN NOS                     

 

 10/20/2016            DL HARRIS FACC, PALAK FACP CCDS            Ot            
794.31            ABNORM ELECTROCARDIOGRAM                     

 

 10/20/2016            DL HARRIS FACC, [place]AK FACP CCDS            Ot            
244.9            HYPOTHYROIDISM NOS                     

 

 10/20/2016            DL HARRIS FACC, [place] FACP CCDS            Ot            
401.9            HYPERTENSION NOS                     

 

 10/20/2016            DL HARRIS FACC, [place] [facility] CCDS            Ot            
424.0            MITRAL VALVE DISORDER                     

 

 10/20/2016            DL HARRIS FACC, [place]AK FACP CCDS            Ot            
593.9            RENAL   URETERAL DIS NOS                     

 

 10/20/2016            DL HARRIS FACC, [place] FACP CCDS            Ot            
786.09            RESPIRATORY ABNORM NEC                     

 

 10/20/2016            DL HARRIS FACC, [place]AK FACP CCDS            Ot            
786.50            CHEST PAIN NOS                     

 

 10/20/2016            DL HARRIS FACC, [place] [facility] CCDS            Ot            
794.31            ABNORM ELECTROCARDIOGRAM                     

 

 10/20/2016            MANDO MARTY STUART ARNP            Ot            
562.10            DIVERTICULOSIS COLON (W/O MENT OF HEMORR                     

 

 10/20/2016            MARTY GILMORE ARNP            Ot            
571.8            CHRONIC LIVER DIS NEC                     

 

 10/20/2016            ENRIQUETAMELISA MARTY STUART ARNP            Ot            
789.1            HEPATOMEGALY                     

 

 10/20/2016            ORENDER DO, ZULEIKA S            Ot            R10.11
            RIGHT UPPER QUADRANT PAIN                     

 

 10/20/2016            ORENDER DO, ZULEIKA S            Ot            R10.31
            RIGHT LOWER QUADRANT PAIN                     

 

 10/20/2016            ORENDER DO, ZULEIKA S            Ot            R10.32
            LEFT LOWER QUADRANT PAIN                     

 

 10/20/2016            ORENDER DO, ZULEIKA S            Ot            
Z85.528            PERSONAL HISTORY OF OTHER MALIGNANT NEOP                     

 

 10/20/2016            MICHAEL WILSON APRN            Ot            R05     
       COUGH                     

 

 10/20/2016            MICHAEL WILSON APRN            Ot            R09.89  
          OTH SYMPTOMS AND SIGNS INVOLVING THE CIR                     

 

 10/20/2016            ORENDER DO, ZULEIKA S            Ot            R06.00
            DYSPNEA, UNSPECIFIED                     

 

 10/20/2016            ORENDER DO, ZULEIKA S            Ot            R07.9 
           CHEST PAIN, UNSPECIFIED                     

 

 10/20/2016            ORENDER DO, ZULEIKA S            Ot            R06.00
            DYSPNEA, UNSPECIFIED                     

 

 10/20/2016            ORENDER DO, ZULEIKA S            Ot            R07.9 
           CHEST PAIN, UNSPECIFIED                     

 

 10/20/2016            ORENDER DO, ZULEIKA S            Ot            K76.0 
           FATTY (CHANGE OF) LIVER, NOT ELSEWHERE C                     

 

 10/25/2016            ORENDER DO, ZULEIKA S            Ot            562.10
            DIVERTICULOSIS COLON (W/O MENT OF HEMORR                     

 

 10/25/2016            ORENDER DO, ZULEIKA S            Ot            787.01
            NAUSEA WITH VOMITING                     

 

 10/25/2016            ROBINSONNDER DO, ZULEIKA S            Ot            789.00
            ABDOMINAL PAIN, UNSPECIFIED SITE                     

 

 10/25/2016            ORENDER DO, ZULEIKA S            Ot            789.00
            ABDOMINAL PAIN, UNSPECIFIED SITE                     

 

 10/25/2016            ORENDER DO, ZULEIKA S            Ot            790.6 
           ABN BLOOD CHEMISTRY NEC                     

 

 10/25/2016            ROBINSONNDER DO, ZULEIKA S            Ot            789.00
            ABDOMINAL PAIN, UNSPECIFIED SITE                     

 

 10/25/2016            ORENDER DO, ZULEIKA S            Ot            571.8 
           CHRONIC LIVER DIS NEC                     

 

 10/25/2016            ORENDER DO, ZULEIKA S            Ot            593.9 
           RENAL   URETERAL DIS NOS                     

 

 10/25/2016            ROBINSONNDER DO, ZULEIKA S            Ot            789.01
            ABDOMINAL PAIN, RIGHT UPPER QUADRANT                     

 

 10/25/2016            ROBINSONNDER DO, ZULEIKA S            Ot            571.8 
           CHRONIC LIVER DIS NEC                     

 

 10/25/2016            ORENDER DO, ZULEIKA S            Ot            593.9 
           RENAL   URETERAL DIS NOS                     

 

 10/25/2016            DL HARRIS FACTAMI, ALI FACP CCDS            Ot            
401.9            HYPERTENSION NOS                     

 

 10/25/2016            DL HARRIS FACTAMI, ALI FACP CCDS            Ot            
424.0            MITRAL VALVE DISORDER                     

 

 10/25/2016            DL HARRIS FACC, ALI FACP CCDS            Ot            
786.09            RESPIRATORY ABNORM NEC                     

 

 10/25/2016            DL HARRIS FACC, ALI FACP CCDS            Ot            
786.50            CHEST PAIN NOS                     

 

 10/25/2016            DL HARRIS FACC, ALI FACP CCDS            Ot            
794.31            ABNORM ELECTROCARDIOGRAM                     

 

 10/25/2016            DL HARRIS FACC, ALI FACP CCDS            Ot            
244.9            HYPOTHYROIDISM NOS                     

 

 10/25/2016            DL HARRIS FACC, ALI FACP CCDS            Ot            
401.9            HYPERTENSION NOS                     

 

 10/25/2016            DL HARRIS FACTAMI, ALI FACP CCDS            Ot            
424.0            MITRAL VALVE DISORDER                     

 

 10/25/2016            DL HARRIS FACTAMI, ALI FACP CCDS            Ot            
593.9            RENAL   URETERAL DIS NOS                     

 

 10/25/2016            DL HARRIS FACTAMI, ALI FACP CCDS            Ot            
786.09            RESPIRATORY ABNORM NEC                     

 

 10/25/2016            DL HARRIS FACC, ALI FACP CCDS            Ot            
786.50            CHEST PAIN NOS                     

 

 10/25/2016            DL HARRIS FACTAMI, ALI FACP CCDS            Ot            
794.31            ABNORM ELECTROCARDIOGRAM                     

 

 10/25/2016            MARTY GILMORE ARNP            Ot            
562.10            DIVERTICULOSIS COLON (W/O MENT OF HEMORR                     

 

 10/25/2016            MARTY GILMORE ARNP            Ot            
571.8            CHRONIC LIVER DIS NEC                     

 

 10/25/2016            MARTY GILMORE ARNP            Ot            
789.1            HEPATOMEGALY                     

 

 10/25/2016            ORENDER DO, ZULEIKA S            Ot            R10.11
            RIGHT UPPER QUADRANT PAIN                     

 

 10/25/2016            ORENDER DO, ZULEIKA S            Ot            R10.31
            RIGHT LOWER QUADRANT PAIN                     

 

 10/25/2016            ORENDER DO, ZULEIKA S            Ot            R10.32
            LEFT LOWER QUADRANT PAIN                     

 

 10/25/2016            ORENDER DO, ZULEIKA S            Ot            
Z85.528            PERSONAL HISTORY OF OTHER MALIGNANT NEOP                     

 

 10/25/2016            MICHAEL WILSON APRN            Ot            R05     
       COUGH                     

 

 10/25/2016            MICHAEL WILSON APRN            Ot            R09.89  
          OTH SYMPTOMS AND SIGNS INVOLVING THE CIR                     

 

 10/25/2016            ORENDER DO, ZULEIKA S            Ot            R06.00
            DYSPNEA, UNSPECIFIED                     

 

 10/25/2016            ORENDER DO, ZULEIKA S            Ot            R07.9 
           CHEST PAIN, UNSPECIFIED                     

 

 10/25/2016            ORENDER DO, ZULEIKA S            Ot            R06.00
            DYSPNEA, UNSPECIFIED                     

 

 10/25/2016            ORENDER DO, ZULEIKA S            Ot            R07.9 
           CHEST PAIN, UNSPECIFIED                     

 

 10/25/2016            ORENDER DO, ZULEIKA S            Ot            K76.0 
           FATTY (CHANGE OF) LIVER, NOT ELSEWHERE C                     

 

 10/25/2016            ORENDER DO, ZULEIKA S            Ot            E03.9 
           HYPOTHYROIDISM, UNSPECIFIED                     

 

 10/25/2016            ORENDER DO, ZULEIKA S            Ot            E04.9 
           NONTOXIC GOITER, UNSPECIFIED                     

 

 10/25/2016            ORENDER DO, ZULEIKA S            Ot            R13.10
            DYSPHAGIA, UNSPECIFIED                     

 

 10/27/2016            ORENDER DO, ZULEIKA S            Ot            K76.0 
           FATTY (CHANGE OF) LIVER, NOT ELSEWHERE C                     

 

 2016            ORENDER DO, ZULEIKA S            Ot            E03.9 
           HYPOTHYROIDISM, UNSPECIFIED                     

 

 2016            ORENDER DO, ZULEIKA S            Ot            E04.9 
           NONTOXIC GOITER, UNSPECIFIED                     

 

 2016            ORENDER DO, ZULEIKA S            Ot            R13.10
            DYSPHAGIA, UNSPECIFIED                     

 

 2016            ADAM SLATER DOLINE S            Ot            562.10
            DIVERTICULOSIS COLON (W/O MENT OF HEMORR                     

 

 2016            ADAM SLATER DOLINE S            Ot            787.01
            NAUSEA WITH VOMITING                     

 

 2016            MARTELL TY, ZULEIKA S            Ot            789.00
            ABDOMINAL PAIN, UNSPECIFIED SITE                     

 

 2016            ROBINSONNDADAM MENDOZA DOLINE S            Ot            789.00
            ABDOMINAL PAIN, UNSPECIFIED SITE                     

 

 2016            MARTELL TY, ZULEIKA S            Ot            790.6 
           ABN BLOOD CHEMISTRY NEC                     

 

 2016            CLARICE SLATER DOQUELINE S            Ot            789.00
            ABDOMINAL PAIN, UNSPECIFIED SITE                     

 

 2016            MARTELL TY, ZULEIKA S            Ot            571.8 
           CHRONIC LIVER DIS NEC                     

 

 2016            MARTELL TY, ZULEIKA S            Ot            593.9 
           RENAL   URETERAL DIS NOS                     

 

 2016            ADAM SLATER DOLINE S            Ot            789.01
            ABDOMINAL PAIN, RIGHT UPPER QUADRANT                     

 

 2016            CLARICE SLATER DOQUELINE S            Ot            571.8 
           CHRONIC LIVER DIS NEC                     

 

 2016            MARTELL TY, ZULEIKA S            Ot            593.9 
           RENAL   URETERAL DIS NOS                     

 

 2016            DL HARRIS FACC, ALI FACP CCDS            Ot            
401.9            HYPERTENSION NOS                     

 

 2016            DL HARRIS FACC, ALI FACP CCDS            Ot            
424.0            MITRAL VALVE DISORDER                     

 

 2016            DL HARRIS FACC, ALI FACP CCDS            Ot            
786.09            RESPIRATORY ABNORM NEC                     

 

 2016            DL HARRIS FACC, ALI FACP CCDS            Ot            
786.50            CHEST PAIN NOS                     

 

 2016            DL HARRIS FACC, ALI FACP CCDS            Ot            
794.31            ABNORM ELECTROCARDIOGRAM                     

 

 2016            DL HARRIS FACC ALI FACP CCDS            Ot            
244.9            HYPOTHYROIDISM NOS                     

 

 2016            DL HARRIS FACC, ALI FACP CCDS            Ot            
401.9            HYPERTENSION NOS                     

 

 2016            DL HARRIS FACC, ALI FACP CCDS            Ot            
424.0            MITRAL VALVE DISORDER                     

 

 2016            PALAK LIZAMA MD, FACC FACP CCDS            Ot            
593.9            RENAL   URETERAL DIS NOS                     

 

 2016            DL HARRIS FACC, ALI FACP CCDS            Ot            
786.09            RESPIRATORY ABNORM NEC                     

 

 2016            DL HARRIS FAC, [place] [facility] CCDS            Ot            
786.50            CHEST PAIN NOS                     

 

 2016            DL HARRIS FAC, [facility] CCDS            Ot            
794.31            ABNORM ELECTROCARDIOGRAM                     

 

 2016            MARTY GILMORE ARNP            Ot            
562.10            DIVERTICULOSIS COLON (W/O MENT OF HEMORR                     

 

 2016            MARTY GILMORE ARNP            Ot            
571.8            CHRONIC LIVER DIS NEC                     

 

 2016            ENRIQUETAVERONICAMARTY ROJO ARNP            Ot            
789.1            HEPATOMEGALY                     

 

 2016            ORENDER DO, ZULEIKA S            Ot            R10.11
            RIGHT UPPER QUADRANT PAIN                     

 

 2016            ORENDER DO, ZULEIKA S            Ot            R10.31
            RIGHT LOWER QUADRANT PAIN                     

 

 2016            ORENDER DO, ZULEIKA S            Ot            R10.32
            LEFT LOWER QUADRANT PAIN                     

 

 2016            ORENDER DO, ZULEIKA S            Ot            
Z85.528            PERSONAL HISTORY OF OTHER MALIGNANT NEOP                     

 

 2016            MICHAEL WILSON APRN            Ot            R05     
       COUGH                     

 

 2016            MICHAEL WILSON APRN            Ot            R09.89  
          OTH SYMPTOMS AND SIGNS INVOLVING THE CIR                     

 

 2016            ORENDER DO, ZULEIKA S            Ot            R06.00
            DYSPNEA, UNSPECIFIED                     

 

 2016            ORENDER DO, ZULEIKA S            Ot            R07.9 
           CHEST PAIN, UNSPECIFIED                     

 

 2016            ORENDER DO, ZULEIKA S            Ot            R06.00
            DYSPNEA, UNSPECIFIED                     

 

 2016            ORENDER DO, ZULEIKA S            Ot            R07.9 
           CHEST PAIN, UNSPECIFIED                     

 

 2016            ORENDER DO, ZULEIKA S            Ot            K76.0 
           FATTY (CHANGE OF) LIVER, NOT ELSEWHERE C                     

 

 2016            ORENDER DO, ZULEIKA S            Ot            E03.9 
           HYPOTHYROIDISM, UNSPECIFIED                     

 

 2016            ORENDER DO, ZULEIKA S            Ot            E04.9 
           NONTOXIC GOITER, UNSPECIFIED                     

 

 2016            ORENDER DO, ZULEIKA S            Ot            R13.10
            DYSPHAGIA, UNSPECIFIED                     

 

 2016            NELL REZA DO            Ot            E03.9     
       HYPOTHYROIDISM, UNSPECIFIED                     

 

 2016            NELL REZA DO            Ot            N39.0     
       URINARY TRACT INFECTION, SITE NOT SPECIF                     

 

 2016            NELL REZA DO TEODORO            Ot            R10.11    
        RIGHT UPPER QUADRANT PAIN                     

 

 2016            NELL REZA DO TEODORO            Ot            R73.9     
       HYPERGLYCEMIA, UNSPECIFIED                     

 

 2016            NELL REZA DO TEODORO            Ot            Z79.899   
         OTHER LONG TERM (CURRENT) DRUG THERAPY                     

 

 2017            MAYRAER ADAM TYLINE S            Ot            R06.00
            DYSPNEA, UNSPECIFIED                     

 

 2017            ORENDER , ZULEIKA S            Ot            R06.00
            DYSPNEA, UNSPECIFIED                     

 

 2017            ORENDER , ZULEIKA S            Ot            R06.00
            DYSPNEA, UNSPECIFIED                     

 

 05/10/2017            BAIMA, MARQUITA L ARNP            Ot            I10      
      ESSENTIAL (PRIMARY) HYPERTENSION                     

 

 05/10/2017            BAIMA, MARQUITA L ARNP            Ot            R00.2    
        PALPITATIONS                     

 

 05/10/2017            BAIMA, MARQUITA L ARNP            Ot            R06.09   
         OTHER FORMS OF DYSPNEA                     

 

 05/10/2017            BAIMA, MARQUITA L ARNP            Ot            R07.89   
         OTHER CHEST PAIN                     

 

 05/10/2017            BAIMA, MARQUITA L ARNP            Ot            I10      
      ESSENTIAL (PRIMARY) HYPERTENSION                     

 

 05/10/2017            BAIMA, MARQUITA L ARNP            Ot            R00.2    
        PALPITATIONS                     

 

 05/10/2017            BAIMA, MARQUITA L ARNP            Ot            R06.09   
         OTHER FORMS OF DYSPNEA                     

 

 05/10/2017            BAIMA, MARQUITA L ARNP            Ot            R07.89   
         OTHER CHEST PAIN                     

 

 05/10/2017            BAIMA, MARQUITA L ARNP            Ot            I10      
      ESSENTIAL (PRIMARY) HYPERTENSION                     

 

 05/10/2017            BAIMA, MARQUITA L ARNP            Ot            R00.2    
        PALPITATIONS                     

 

 05/10/2017            BAIMA, MARQUITA L ARNP            Ot            R06.09   
         OTHER FORMS OF DYSPNEA                     

 

 05/10/2017            BAIMA, MARQUITA L ARNP            Ot            R07.89   
         OTHER CHEST PAIN                     

 

 05/10/2017            BAIMA, MARQUITA L ARNP            Ot            I10      
      ESSENTIAL (PRIMARY) HYPERTENSION                     

 

 05/10/2017            BAIMA, MARQUITA L ARNP            Ot            R00.2    
        PALPITATIONS                     

 

 05/10/2017            BAIMA, MARQUITA L ARNP            Ot            R06.09   
         OTHER FORMS OF DYSPNEA                     

 

 05/10/2017            BAIMA, MARQUITA L ARNP            Ot            R07.89   
         OTHER CHEST PAIN                     

 

 2017            BAIMA, MARQUITA L ARNP            Ot            I10      
      ESSENTIAL (PRIMARY) HYPERTENSION                     

 

 2017            BAIMA, MARQUITA L ARNP            Ot            R00.2    
        PALPITATIONS                     

 

 2017            BAIMA, MARQUITA L ARNP            Ot            R06.09   
         OTHER FORMS OF DYSPNEA                     

 

 2017            BAIMA, MARQUITA L ARNP            Ot            R07.89   
         OTHER CHEST PAIN                     

 

 2017            BAIMA, MARQUITA L ARNP            Ot            I10      
      ESSENTIAL (PRIMARY) HYPERTENSION                     

 

 2017            BAIMA, MARQUITA L ARNP            Ot            R00.2    
        PALPITATIONS                     

 

 2017            BAIMA, MARQUITA L ARNP            Ot            R06.09   
         OTHER FORMS OF DYSPNEA                     

 

 2017            BAIMA, MARQUITA L ARNP            Ot            R07.89   
         OTHER CHEST PAIN                     

 

 2017            BAIMA, MARQUITA L ARNP            Ot            I10      
      ESSENTIAL (PRIMARY) HYPERTENSION                     

 

 2017            BAIMA, MARQUITA L ARNP            Ot            R00.2    
        PALPITATIONS                     

 

 2017            BAIMA, MARQUITA L ARNP            Ot            R06.09   
         OTHER FORMS OF DYSPNEA                     

 

 2017            BAIMA, MARQUITA L ARNP            Ot            R07.89   
         OTHER CHEST PAIN                     

 

 2017            BAIMA, MARQUITA L ARNP            Ot            I10      
      ESSENTIAL (PRIMARY) HYPERTENSION                     

 

 2017            BAIMA, MARQUITA L ARNP            Ot            R00.2    
        PALPITATIONS                     

 

 2017            BAIMA, MARQUITA L ARNP            Ot            R06.09   
         OTHER FORMS OF DYSPNEA                     

 

 2017            BAIMA, MARQUITA L ARNP            Ot            R07.89   
         OTHER CHEST PAIN                     

 

 2018            STEFANIE GUILLEN APRN            Ot            N10       
     ACUTE PYELONEPHRITIS                     

 

 2018            STEFANIE GUILLEN APRN            Ot            R11.0     
       NAUSEA                     

 

 2018            STEFANIE GUILLEN APRN            Ot            N10       
     ACUTE PYELONEPHRITIS                     

 

 2018            STEFANIE GUILLEN APRN            Ot            R11.0     
       NAUSEA                     

 

 2018            ZULEIKA SLATER DO S            Ot            E11.9 
           TYPE 2 DIABETES MELLITUS WITHOUT COMPLIC                     

 

 2018            ZULEIKA SLATER DO S            Ot            E89.0 
           POSTPROCEDURAL HYPOTHYROIDISM                     

 

 2018            ADAM SLATER DOLINE S            Ot            I10   
         ESSENTIAL (PRIMARY) HYPERTENSION                     

 

 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
            ATHSCL HEART DISEASE OF NATIVE CORONARY                      

 

 2018            ZULEIKA SLATER DO S            Ot            I25.2 
           OLD MYOCARDIAL INFARCTION                     

 

 2018            ZULEIKA SLATER DO            Ot            I34.1 
           NONRHEUMATIC MITRAL (VALVE) PROLAPSE                     

 

 2018            ZULEIKA SLATER DO            Ot            J18.9 
           PNEUMONIA, UNSPECIFIED ORGANISM                     

 

 2018            ZULEIKA SLATER DO S            Ot            K21.9 
           GASTRO-ESOPHAGEAL REFLUX DISEASE WITHOUT                     

 

 2018            ZULEIKA SLATER DO S            Ot            K44.9 
           DIAPHRAGMATIC HERNIA WITHOUT OBSTRUCTION                     

 

 2018            ZULEIKA SLATER DO S            Ot            K50.90
            CROHN'S DISEASE, UNSPECIFIED, WITHOUT CO                     

 

 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                      

 

 2018            ZULEIKA SLATER DO            Ot            K58.9 
           IRRITABLE BOWEL SYNDROME WITHOUT DIARRHE                     

 

 2018            ZULEIKA SLATER DO S            Ot            K59.00
            CONSTIPATION, UNSPECIFIED                     

 

 2018            ZULEIKA SLATER DO S            Ot            L93.0 
           DISCOID LUPUS ERYTHEMATOSUS                     

 

 2018            ZULEIKA SLATER DO S            Ot            M19.91
            PRIMARY OSTEOARTHRITIS, UNSPECIFIED SITE                     

 

 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 
           ANKYLOSING SPONDYLITIS OF UNSPECIFIED SI                     

 

 2018            ZULEIKA SLATER DO S            Ot            R33.9 
           RETENTION OF URINE, UNSPECIFIED                     

 

 2018            ZULEIKA SLATER DO S            Ot            
Z86.010            PERSONAL HISTORY OF COLONIC POLYPS                     

 

 2018            ZULEIKA SLATER DO            Ot            Z87.11
            PERSONAL HISTORY OF PEPTIC ULCER DISEASE                     

 

 2018            ZULEIKA SLATER DO S            Ot            
Z87.440            PERSONAL HISTORY OF URINARY (TRACT) INFE                     

 

 2018            ZULEIKA SLATER DO S            Ot            
Z87.442            PERSONAL HISTORY OF URINARY CALCULI                     

 

 2018            ZULEIKA SLATER DO S            Ot            
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                     

 

 2018            ZULEIKA SLATER DO            Ot            J18.9 
           PNEUMONIA, UNSPECIFIED ORGANISM                     

 

 2018            ZULEIKA SLATER DO            Ot            J18.9 
           PNEUMONIA, UNSPECIFIED ORGANISM                     

 

 2018            ZULEIKA SLATER DO            Ot            J18.9 
           PNEUMONIA, UNSPECIFIED ORGANISM                     

 

 2018            YIMI VERGARAJUANCARLOS DELACRUZ            Ot            R88.8 
           ABNORMAL FINDINGS IN OTHER BODY FLUIDS A                     

 

 2018            URSULAANN-MARIE            Ot            R88.8 
           ABNORMAL FINDINGS IN OTHER BODY FLUIDS A                     

 

 2018            URSULA ANN-MARIEJUANCARLOS DELACRUZ            Ot            R88.8 
           ABNORMAL FINDINGS IN OTHER BODY FLUIDS A                     



                                                                               
                                                                               
                                                                               
                                                                               
                                                                               
                                                                               
                                                                               
                                                                               
                                                                               
                                                                               
                



Procedures

      



There is no data.                  



Results

      





 Test            Result            Range        









 Complete blood count (CBC) with automated white blood cell (WBC) differential 
- 16 08:50         









 Blood leukocytes automated count (number/volume)            8.9 10*3/uL       
     4.3-11.0        

 

 Blood erythrocytes automated count (number/volume)            4.26 10*6/uL    
        4.35-5.85        

 

 Venous blood hemoglobin measurement (mass/volume)            13.4 g/dL        
    11.5-16.0        

 

 Blood hematocrit (volume fraction)            39 %            35-52        

 

 Automated erythrocyte mean corpuscular volume            93 [foz_us]          
  80-99        

 

 Automated erythrocyte mean corpuscular hemoglobin (mass per erythrocyte)      
      32 pg            25-34        

 

 Automated erythrocyte mean corpuscular hemoglobin concentration measurement (
mass/volume)            34 g/dL            32-36        

 

 Automated erythrocyte distribution width ratio            13.6 %            
10.0-14.5        

 

 Automated blood platelet count (count/volume)            322 10*3/uL          
  130-400        

 

 Automated blood platelet mean volume measurement            9.5 [foz_us]      
      7.4-10.4        

 

 Automated blood neutrophils/100 leukocytes            78 %            42-75   
     

 

 Automated blood lymphocytes/100 leukocytes            15 %            12-44   
     

 

 Blood monocytes/100 leukocytes            7 %            0-12        

 

 Automated blood eosinophils/100 leukocytes            1 %            0-10     
   

 

 Automated blood basophils/100 leukocytes            0 %            0-10        

 

 Blood neutrophils automated count (number/volume)            6.9 10*3         
   1.8-7.8        

 

 Blood lymphocytes automated count (number/volume)            1.3 10*3         
   1.0-4.0        

 

 Blood monocytes automated count (number/volume)            0.6 10*3            
0.0-1.0        

 

 Automated eosinophil count            0.0 10*3/uL            0.0-0.3        

 

 Automated blood basophil count (count/volume)            0.0 10*3/uL          
  0.0-0.1        









 Complete urinalysis with reflex to culture - 16 08:50         









 Urine color determination            YELLOW             NRG        

 

 Urine clarity determination            CLEAR             NRG        

 

 Urine pH measurement by test strip            5             5-9        

 

 Specific gravity of urine by test strip            1.025             1.016-
1.022        

 

 Urine protein assay by test strip, semi-quantitative            NEGATIVE      
       NEGATIVE        

 

 Urine glucose detection by automated test strip            NEGATIVE           
  NEGATIVE        

 

 Erythrocytes detection in urine sediment by light microscopy            
NEGATIVE             NEGATIVE        

 

 Urine ketones detection by automated test strip            NEGATIVE           
  NEGATIVE        

 

 Urine nitrite detection by test strip            NEGATIVE             NEGATIVE
        

 

 Urine total bilirubin detection by test strip            NEGATIVE             
NEGATIVE        

 

 Urine urobilinogen measurement by automated test strip (mass/volume)          
  NORMAL             NORMAL        

 

 Urine leukocyte esterase detection by dipstick            NEGATIVE             
NEGATIVE        

 

 Automated urine sediment erythrocyte count by microscopy (number/high power 
field)            NONE             NRG        

 

 Automated urine sediment leukocyte count by microscopy (number/high power field
)             [HPF]            NRG        

 

 Bacteria detection in urine sediment by light microscopy            MODERATE  
           NRG        

 

 Squamous epithelial cells detection in urine sediment by light microscopy     
       10-25             NRG        

 

 Crystals detection in urine sediment by light microscopy            NONE      
       NRG        

 

 Casts detection in urine sediment by light microscopy            NONE         
    NRG        

 

 Mucus detection in urine sediment by light microscopy            SMALL        
     NRG        

 

 Complete urinalysis with reflex to culture            YES             NRG     
   









 Comprehensive metabolic panel - 16 08:50         









 Serum or plasma sodium measurement (moles/volume)            138 mmol/L       
     135-145        

 

 Serum or plasma potassium measurement (moles/volume)            4.2 mmol/L    
        3.6-5.0        

 

 Serum or plasma chloride measurement (moles/volume)            104 mmol/L     
               

 

 Carbon dioxide            24 mmol/L            21-32        

 

 Serum or plasma anion gap determination (moles/volume)            10 mmol/L   
         5-14        

 

 Serum or plasma urea nitrogen measurement (mass/volume)            16 mg/dL   
         7-18        

 

 Serum or plasma creatinine measurement (mass/volume)            1.00 mg/dL    
        0.60-1.30        

 

 Serum or plasma urea nitrogen/creatinine mass ratio            16             
NRG        

 

 Serum or plasma creatinine measurement with calculation of estimated 
glomerular filtration rate            56             NRG        

 

 Serum or plasma glucose measurement (mass/volume)            190 mg/dL        
            

 

 Serum or plasma calcium measurement (mass/volume)            8.9 mg/dL        
    8.5-10.1        

 

 Serum or plasma total bilirubin measurement (mass/volume)            0.4 mg/dL
            0.1-1.0        

 

 Serum or plasma alkaline phosphatase measurement (enzymatic activity/volume)  
          73 U/L                    

 

 Serum or plasma aspartate aminotransferase measurement (enzymatic activity/
volume)            17 U/L            5-34        

 

 Serum or plasma alanine aminotransferase measurement (enzymatic activity/volume
)            25 U/L            0-55        

 

 Serum or plasma protein measurement (mass/volume)            7.3 g/dL         
   6.4-8.2        

 

 Serum or plasma albumin measurement (mass/volume)            4.3 g/dL         
   3.2-4.5        









 Lipase - 16 08:50         









 Lipase            51 U/L            8-78        









 Hemoglobin A1c - 16 08:50         









 Hemoglobin A1c            6.0 %            4.5-6.2        









 Bacterial urine culture - 16 08:50         









 URINE CULTURE RESULTS            <10,000/ML             NRG        









 Rheumatiod Factor, Quantitative - 17 13:35         









 Rheumatoid Factor, Quantitative            26 IU/ml            0-30        









 CCP Antibodies IgG/IgA - 17 13:35         









 CCP ANTIBODIES IGG/IGA            6 UNITS            0-19        









 Complete blood count (CBC) with automated white blood cell (WBC) differential 
- 06/10/18 14:13         









 Blood leukocytes automated count (number/volume)            9.8 10*3/uL       
     4.3-11.0        

 

 Blood erythrocytes automated count (number/volume)            4.47 10*6/uL    
        4.35-5.85        

 

 Venous blood hemoglobin measurement (mass/volume)            14.4 g/dL        
    11.5-16.0        

 

 Blood hematocrit (volume fraction)            41 %            35-52        

 

 Automated erythrocyte mean corpuscular volume            91 [foz_us]          
  80-99        

 

 Automated erythrocyte mean corpuscular hemoglobin (mass per erythrocyte)      
      32 pg            25-34        

 

 Automated erythrocyte mean corpuscular hemoglobin concentration measurement (
mass/volume)            35 g/dL            32-36        

 

 Automated erythrocyte distribution width ratio            13.6 %            
10.0-14.5        

 

 Automated blood platelet count (count/volume)            357 10*3/uL          
  130-400        

 

 Automated blood platelet mean volume measurement            9.2 [foz_us]      
      7.4-10.4        

 

 Automated blood neutrophils/100 leukocytes            69 %            42-75   
     

 

 Automated blood lymphocytes/100 leukocytes            20 %            12-44   
     

 

 Blood monocytes/100 leukocytes            10 %            0-12        

 

 Automated blood eosinophils/100 leukocytes            0 %            0-10     
   

 

 Automated blood basophils/100 leukocytes            0 %            0-10        

 

 Blood neutrophils automated count (number/volume)            6.8 10*3         
   1.8-7.8        

 

 Blood lymphocytes automated count (number/volume)            2.0 10*3         
   1.0-4.0        

 

 Blood monocytes automated count (number/volume)            0.9 10*3            
0.0-1.0        

 

 Automated eosinophil count            0.0 10*3/uL            0.0-0.3        

 

 Automated blood basophil count (count/volume)            0.0 10*3/uL          
  0.0-0.1        









 Comprehensive metabolic panel - 06/10/18 14:13         









 Serum or plasma sodium measurement (moles/volume)            136 mmol/L       
     135-145        

 

 Serum or plasma potassium measurement (moles/volume)            4.8 mmol/L    
        3.6-5.0        

 

 Serum or plasma chloride measurement (moles/volume)            103 mmol/L     
               

 

 Carbon dioxide            21 mmol/L            21-32        

 

 Serum or plasma anion gap determination (moles/volume)            12 mmol/L   
         5-14        

 

 Serum or plasma urea nitrogen measurement (mass/volume)            13 mg/dL   
         7-18        

 

 Serum or plasma creatinine measurement (mass/volume)            0.84 mg/dL    
        0.60-1.30        

 

 Serum or plasma urea nitrogen/creatinine mass ratio            15             
NRG        

 

 Serum or plasma creatinine measurement with calculation of estimated 
glomerular filtration rate            >             NRG        

 

 Serum or plasma glucose measurement (mass/volume)            110 mg/dL        
            

 

 Serum or plasma calcium measurement (mass/volume)            9.5 mg/dL        
    8.5-10.1        

 

 Serum or plasma total bilirubin measurement (mass/volume)            0.3 mg/dL
            0.1-1.0        

 

 Serum or plasma alkaline phosphatase measurement (enzymatic activity/volume)  
          74 U/L                    

 

 Serum or plasma aspartate aminotransferase measurement (enzymatic activity/
volume)            16 U/L            5-34        

 

 Serum or plasma alanine aminotransferase measurement (enzymatic activity/volume
)            23 U/L            0-55        

 

 Serum or plasma protein measurement (mass/volume)            8.4 g/dL         
   6.4-8.2        

 

 Serum or plasma albumin measurement (mass/volume)            4.4 g/dL         
   3.2-4.5        









 Serum or plasma C reactive protein measurement (mass/volume) - 06/10/18 14:13 
        









 Serum or plasma C reactive protein measurement (mass/volume)            3.20 mg
/dL            0.00-0.50        









 Capillary blood glucose measurement by glucometer (mass/volume) - 18 15:
40         









 Capillary blood glucose measurement by glucometer (mass/volume)            152 
mg/dL                    









 Bacterial blood culture - 18 16:00         









 Bacterial blood culture            NG             NRG        









 Complete blood count (CBC) with automated white blood cell (WBC) differential 
- 18 16:07         









 Blood leukocytes automated count (number/volume)            9.5 10*3/uL       
     4.3-11.0        

 

 Blood erythrocytes automated count (number/volume)            4.05 10*6/uL    
        4.35-5.85        

 

 Venous blood hemoglobin measurement (mass/volume)            12.9 g/dL        
    11.5-16.0        

 

 Blood hematocrit (volume fraction)            37 %            35-52        

 

 Automated erythrocyte mean corpuscular volume            91 [foz_us]          
  80-99        

 

 Automated erythrocyte mean corpuscular hemoglobin (mass per erythrocyte)      
      32 pg            25-34        

 

 Automated erythrocyte mean corpuscular hemoglobin concentration measurement (
mass/volume)            35 g/dL            32-36        

 

 Automated erythrocyte distribution width ratio            13.3 %            
10.0-14.5        

 

 Automated blood platelet count (count/volume)            435 10*3/uL          
  130-400        

 

 Automated blood platelet mean volume measurement            9.0 [foz_us]      
      7.4-10.4        

 

 Automated blood neutrophils/100 leukocytes            68 %            42-75   
     

 

 Automated blood lymphocytes/100 leukocytes            23 %            12-44   
     

 

 Blood monocytes/100 leukocytes            8 %            0-12        

 

 Automated blood eosinophils/100 leukocytes            0 %            0-10     
   

 

 Automated blood basophils/100 leukocytes            0 %            0-10        

 

 Blood neutrophils automated count (number/volume)            6.4 10*3         
   1.8-7.8        

 

 Blood lymphocytes automated count (number/volume)            2.2 10*3         
   1.0-4.0        

 

 Blood monocytes automated count (number/volume)            0.8 10*3            
0.0-1.0        

 

 Automated eosinophil count            0.0 10*3/uL            0.0-0.3        

 

 Automated blood basophil count (count/volume)            0.0 10*3/uL          
  0.0-0.1        









 Whole blood basic metabolic panel - 18 16:07         









 Serum or plasma sodium measurement (moles/volume)            135 mmol/L       
     135-145        

 

 Serum or plasma potassium measurement (moles/volume)            4.4 mmol/L    
        3.6-5.0        

 

 Serum or plasma chloride measurement (moles/volume)            102 mmol/L     
               

 

 Carbon dioxide            22 mmol/L            21-32        

 

 Serum or plasma anion gap determination (moles/volume)            11 mmol/L   
         5-14        

 

 Serum or plasma urea nitrogen measurement (mass/volume)            12 mg/dL   
         7-18        

 

 Serum or plasma creatinine measurement (mass/volume)            0.84 mg/dL    
        0.60-1.30        

 

 Serum or plasma urea nitrogen/creatinine mass ratio            14             
NRG        

 

 Serum or plasma creatinine measurement with calculation of estimated 
glomerular filtration rate            >             NRG        

 

 Serum or plasma glucose measurement (mass/volume)            134 mg/dL        
            

 

 Serum or plasma calcium measurement (mass/volume)            9.4 mg/dL        
    8.5-10.1        









 Bacterial blood culture - 18 16:07         









 Bacterial blood culture            NG             NRG        









 Capillary blood glucose measurement by glucometer (mass/volume) - 18 20:
49         









 Capillary blood glucose measurement by glucometer (mass/volume)            186 
mg/dL                    









 Automated blood complete blood count (hemogram) panel - 18 05:10         









 Blood leukocytes automated count (number/volume)            8.5 10*3/uL       
     4.3-11.0        

 

 Blood erythrocytes automated count (number/volume)            3.75 10*6/uL    
        4.35-5.85        

 

 Venous blood hemoglobin measurement (mass/volume)            11.8 g/dL        
    11.5-16.0        

 

 Blood hematocrit (volume fraction)            35 %            35-52        

 

 Automated erythrocyte mean corpuscular volume            92 [foz_us]          
  80-99        

 

 Automated erythrocyte mean corpuscular hemoglobin (mass per erythrocyte)      
      31 pg            25-34        

 

 Automated erythrocyte mean corpuscular hemoglobin concentration measurement (
mass/volume)            34 g/dL            32-36        

 

 Automated erythrocyte distribution width ratio            13.4 %            
10.0-14.5        

 

 Automated blood platelet count (count/volume)            404 10*3/uL          
  130-400        

 

 Automated blood platelet mean volume measurement            8.9 [foz_us]      
      7.4-10.4        









 Comprehensive metabolic panel - 18 05:10         









 Serum or plasma sodium measurement (moles/volume)            136 mmol/L       
     135-145        

 

 Serum or plasma potassium measurement (moles/volume)            4.1 mmol/L    
        3.6-5.0        

 

 Serum or plasma chloride measurement (moles/volume)            102 mmol/L     
               

 

 Carbon dioxide            22 mmol/L            21-32        

 

 Serum or plasma anion gap determination (moles/volume)            12 mmol/L   
         5-14        

 

 Serum or plasma urea nitrogen measurement (mass/volume)            11 mg/dL   
         7-18        

 

 Serum or plasma creatinine measurement (mass/volume)            0.84 mg/dL    
        0.60-1.30        

 

 Serum or plasma urea nitrogen/creatinine mass ratio            13             
NRG        

 

 Serum or plasma creatinine measurement with calculation of estimated 
glomerular filtration rate            >             NRG        

 

 Serum or plasma glucose measurement (mass/volume)            135 mg/dL        
            

 

 Serum or plasma calcium measurement (mass/volume)            8.8 mg/dL        
    8.5-10.1        

 

 Serum or plasma total bilirubin measurement (mass/volume)            0.3 mg/dL
            0.1-1.0        

 

 Serum or plasma alkaline phosphatase measurement (enzymatic activity/volume)  
          61 U/L                    

 

 Serum or plasma aspartate aminotransferase measurement (enzymatic activity/
volume)            15 U/L            5-34        

 

 Serum or plasma alanine aminotransferase measurement (enzymatic activity/volume
)            13 U/L            0-55        

 

 Serum or plasma protein measurement (mass/volume)            6.8 g/dL         
   6.4-8.2        

 

 Serum or plasma albumin measurement (mass/volume)            3.6 g/dL         
   3.2-4.5        









 Capillary blood glucose measurement by glucometer (mass/volume) - 18 06:
43         









 Capillary blood glucose measurement by glucometer (mass/volume)            134 
mg/dL                    









 Capillary blood glucose measurement by glucometer (mass/volume) - 18 09:
29         









 Capillary blood glucose measurement by glucometer (mass/volume)            195 
mg/dL                    









 Capillary blood glucose measurement by glucometer (mass/volume) - 18 15:
40         









 Capillary blood glucose measurement by glucometer (mass/volume)            196 
mg/dL                    









 Capillary blood glucose measurement by glucometer (mass/volume) - 18 21:
16         









 Capillary blood glucose measurement by glucometer (mass/volume)            229 
mg/dL                    









 Capillary blood glucose measurement by glucometer (mass/volume) - 18 05:
25         









 Capillary blood glucose measurement by glucometer (mass/volume)            132 
mg/dL                    









 Capillary blood glucose measurement by glucometer (mass/volume) - 18 10:
46         









 Capillary blood glucose measurement by glucometer (mass/volume)            162 
mg/dL                    









 Capillary blood glucose measurement by glucometer (mass/volume) - 18 15:
47         









 Capillary blood glucose measurement by glucometer (mass/volume)            187 
mg/dL                    









 Capillary blood glucose measurement by glucometer (mass/volume) - 18 21:
09         









 Capillary blood glucose measurement by glucometer (mass/volume)            184 
mg/dL                    









 Capillary blood glucose measurement by glucometer (mass/volume) - 18 05:
14         









 Capillary blood glucose measurement by glucometer (mass/volume)            164 
mg/dL                    









 Capillary blood glucose measurement by glucometer (mass/volume) - 18 11:
24         









 Capillary blood glucose measurement by glucometer (mass/volume)            198 
mg/dL                    









 Cerebrospinal fluid cell count - 18 11:10         









 Cerebrospinal fluid appearance description            CLEAR             NRG   
     

 

 Cerebrospinal fluid color identification            COLORLESS             NRG 
       

 

 Cerebrospinal fluid leukocytes count (number/volume)            1.1 %         
   0-5        

 

 Cerebrospinal fluid erythrocytes count (number/volume)            12.2 %      
      0-0        

 

 Manual cerebrospinal fluid lymphocytes/100 leukocytes            TNP          
   NRG        

 

 Manual cerebrospinal fluid mononuclear cells/100 leukocytes            TNP    
         NRG        

 

 Manual cerebrospinal fluid polymorphonuclear cells/100 leukocytes            
TNP             NRG        

 

 Cerebrospinal fluid cell count on specimen from last tube collected            
3             NRG        









 Cerebrospinal fluid glucose measurement (mass/volume) - 18 11:10         









 Cerebrospinal fluid glucose measurement (mass/volume)            96 mg/dL     
       50-80        









 Cerebrospinal fluid protein measurement (mass/volume) - 18 11:10         









 Cerebrospinal fluid protein measurement (mass/volume)            28 mg/dL     
       15-40        









 Gram stain microscopy - 18 11:10         









 Gram stain microscopy            No bacteria seen             NRG        









 Bacterial cerebrospinal fluid culture - 18 11:10         









 Bacterial cerebrospinal fluid culture            NG             NRG        









 C FUNGUS SPUTUM FLUID TISSUE - 18 11:10         









 C FUNGUS SPUTUM FLUID TISSUE            NG             NRG        









 Cerebrospinal fluid microscopic examination by Clair ink prep - 18 11:10
         

 

 AFB CULT T STAIN TISSUE/FLUID - 18 11:10         









 QUANTITY OF GROWTH            .             NRG        

 

 AFB CULT T STAIN TISSUE/FLUID            SEE COMMEN             NRG        









 Cerebrospinal fluid Cryptococcus antigen detection - 18 11:10         









 Cerebrospinal fluid Cryptococcus antigen detection            Negative        
     Negative        



                                                                               
           



Encounters

      





 ACCT No.            Visit Date/Time            Discharge            Status    
        Pt. Type            Provider            Facility            Loc./Unit  
          Complaint        

 

 [id number]            2018 09:55:00            2018 23:59:59    
        CLS            Outpatient            ANN-MARIE VERGARA          
  [facility]            RAD            RULE OUT 
MENINGITIS R88.8        

 

 [id number]            2018 11:04:00            2018 23:59:59    
        CLS            Outpatient            ZULEIKA SLATER DO S          
  [facility]            RAD            PNEUMONIA        

 

 [id number]            2018 16:10:00            2018 23:59:59    
        CLS            Outpatient            ZULEIKA SLATER DO S          
  [facility]            RAD            COUGH,RIGHT-SIDED 
PAIN        

 

 [id number]            2018 14:30:00            2018 11:55:00    
        DIS            Inpatient            ADAM SLATER DOLINE S            
[facility]            4TH            PNUEMONIA        

 

 [id number]            06/10/2018 14:01:00            06/10/2018 23:59:59    
        CLS            Outpatient            STEFANIE GUILLEN            
[facility]            LAB            R FLANK PAIN,NAUSEA,
FEVER,ACUTE PYELONEPHRITIS        

 

 [id number]            2017 14:35:00            2017 23:59:59    
        CLS            Outpatient            MARQUITA CARR            
[facility]            CARD            R07.89,R06.09,I10  
      

 

 [id number]            2017 12:27:00            2017 23:59:59    
        CLS            Outpatient            MARQUITA CARR            
[facility]            CARD            R07.89,R06.09      
  

 

 [id number]            2017 11:52:00            2017 23:59:59    
        CLS            Outpatient            MARTELL TY ZULEIKA S          
  [facility]            RAD            DYSPNEA        

 

 [id number]            2016 08:37:00            2016 10:08:00    
        DIS            Emergency            JUAQUIN NELL            [facility]            ER            RIGHT SIDE PAIN        

 

 [id number]            10/20/2016 15:41:00            10/20/2016 23:59:59    
        CLS            Outpatient            MARTELL DO ZULEIKA S          
  [facility]            RAD            HYPOTHYROIDISM,
DYSPHAGIA,THYROID GOITER        

 

 [id number]            10/14/2016 14:45:00            10/14/2016 23:59:59    
        CLS            Outpatient            MARTELL ADAM TYLINE S          
  [facility]            RAD            ABD PAIN        

 

 [id number]            2016 15:55:00            2016 23:59:59    
        CLS            Outpatient            ADAM SLATER DOLINE S          
  [facility]            RAD            RT CHEST PAIN,
DYSPNEA        

 

 [id number]            2016 15:38:00            2016 23:59:59    
        CLS            Outpatient            MAYRAKELLY ADAM TYLINE S          
  [facility]            RAD            RT CHEST PAIN,
DYSPNEA        

 

 [id number]            2016 19:31:00            2016 19:05:00    
        DIS            Outpatient            ADAM SLATER DOLINE S          
  [facility]            SDC            INTRACTABLE 
ABDOMINAL PAIN        

 

 [id number]            2016 10:31:00            2016 23:59:59    
        CLS            Outpatient            MICHAEL WILSON SUZANNE DELACRUZ            
[facility]            RAD            CHEST CONGESTION,
COUGH        

 

 [id number]            2016 11:50:00            2016 23:59:59    
        CLS            Outpatient            ADAM SLATER DOLINE S          
  [facility]            RAD            RIGHT UPPER QUAD 
PAIN,BILAT FLANK PAIN        

 

 [id number]            2015 05:55:00            2015 12:37:00    
        DIS            Outpatient            DL HARRIS FACC, PALAK RUVALCABA CCDS     
       [facility]            CATH            CHEST PAIN, 
ABNORMAL EKG, DYSPNEA        

 

 [id number]            10/17/2014 21:25:00            10/22/2014 11:57:00    
        DIS            Inpatient            ADAM SLATER DOLINE S            
[facility]            SURGICAL            ABD PAIN; 
COLITIS, EXACERBATION OF CROHN'S        

 

 [id number]            2014 15:47:00            2014 23:59:59    
        CLS            Outpatient            MARTY GILMORE      
      [facility]            RAD            ABD PAIN      
  

 

 [id number]            2014 20:08:00            2014 12:55:00    
        DIS            Inpatient            ADAM SLATER DOLINE S            
[facility]            4TH            INTRACTABLE NAUSEA 
AND ABD PAIN        

 

 [id number]            2014 08:04:00            2014 23:59:59    
        CLS            Outpatient            DL HARRIS FACC, PALAK RUVALCABA CCDS     
       [facility]            RAD            ABNORMAL EKG,
CHEST PAIN        

 

 [id number]            03/10/2014 11:14:00            03/10/2014 23:59:59    
        CLS            Outpatient            DL HARRIS FACC, PALAK RUVALCABA CCDS     
       [facility]            CARD            CP,ABNORMAL 
EKG        

 

 [id number]            2014 13:46:00            2014 23:59:59    
        CLS            Outpatient            ORENDER DO, ZULEIKA S          
  [facility]            RAD            LEFT KIDNEY LESION
        

 

 [id number]            2014 10:45:00            2014 23:59:59    
        CLS            Outpatient            ORENDER DO, ZULEIKA S          
  [facility]            RAD            RUQ PAIN        

 

 [id number]            2014 17:33:00            2014 23:59:59    
        CLS            Outpatient            ORENDER DO, ZULEIKA S          
  [facility]            LAB            ABDOMINAL PAIN    
    

 

 [id number]            2014 17:12:00            2014 23:59:59    
        CLS            Outpatient            ORENDER DO, ZULEIKA S          
  [facility]            LAB            INCREASED LFT, ABD 
PAIN        

 

 [id number]            2014 09:13:00            2014 13:40:00    
        DIS            Inpatient            ORENDER DO, ZULEIKA S            
[facility]            SURGICAL            ABD PAIN/ILEUS 
       

 

 [id number]            2013 16:37:00            2013 15:13:00    
        DIS            Inpatient            ORENDER DO, ZULEIKA S            
[facility]            SURGICAL            ABD PAIN,BOWEL 
OBSTRUCTION        

 

 [id number]            2013 09:42:00            2013 23:59:59    
        CLS            Outpatient            ORENDER DO, ZULEIKA S          
  [facility]            RAD            ABD PAIN, RUQ PAIN
, RLQ PAIN        

 

 [id number]            2013 15:06:00            2013 13:05:00    
        DIS            Inpatient            ROBINSONNDER DO, ZULEIKA S            
[facility]            SURGICAL            ACUTE 
DIVERTICULITIS        

 

 [id number]            2013 15:53:00            2013 23:59:59    
        CLS            Outpatient                                              
              

 

 [id number]            2019 09:53:00                         ACT       
     Emergency            RICHY HARRIS, RAJEEV FUNES            [facility]            ER            SOB/CONCERN ABOUT BLOOD CLOT        

 

 [id number]            2012 09:56:00                                   
   Document Registration                                                       
     

 

 [id number]            2018 12:17:00            2018 23:59:00          
  DIS            Outpatient            Zuleika Slater                     
                          

 

 [id number]            2017 13:32:00            2017 23:59:00          
  DIS            Outpatient            Jacinto Jack                    
                           

 

 2019 14:41:53            2019 23:59:59          
  CLS            Outpatient            Zuleika Slater                  
                             

 

 3/2018            08/10/2017 10:12:09            08/10/2017 23:59:59          
  CLS            Outpatient            Zuleika Slater

## 2019-02-09 NOTE — ED RESPIRATORY
General


Chief Complaint:  Chest Pain


Stated Complaint:  SOB/CONCERN ABOUT BLOOD CLOT


Nursing Triage Note:  


PT CO OF CHEST PAIN STARTED LAST PM RATES AS CRAMPING IRRITATING PAIN 3/10. HAS 


RECENT HX OF FUNGAL PNEM. PT STATES NOT HEART PAIN. HAD SOA LAST EVENING


Source:  patient


Exam Limitations:  no limitations





History of Present Illness


Date Seen by Provider:  Feb 9, 2019


Time Seen by Provider:  12:33


Initial Comments


This 60-year-old white female presents with a complaint of chest discomfort 

that is been intermittently present for the last several days.  The patient has 

a history of a chronic fungal infection (coccidiomycosis) for which she is 

taking Diflucan.  The patient has had similar episodes of chest discomfort in 

the past from this disease process.





Patient denies diaphoresis, nausea, fever or chill.





Allergies and Home Medications


Allergies


Coded Allergies:  


     Penicillins (Verified  Allergy, Unknown, 4/5/14)


     clarithromycin (Verified  Allergy, Unknown, 6/27/18)


 


 Patient as tolerated azithromycin


     gluten (Verified  Allergy, Unknown, 4/7/14)


     metoclopramide (Unverified  Allergy, Unknown, 3/30/16)


     piroxicam (Verified  Allergy, Unknown, Pt has received Ketorolac w/o issue

, 6/26/18)


     sulfacetamide sodium (Verified  Allergy, Unknown, 4/5/14)





Home Medications


Albuterol Sulfate 18 Gm Hfa.aer.ad, 2 PUFF INH Q4H PRN for SHORTNESS OF BREATH, 

(Reported)


Albuterol Sulfate 2.5 Mg/3 Ml Vial.neb, 2.5 MG NEB Q4H PRN for SHORTNESS OF 

BREATH, (Reported)


Amlodipine Besylate 5 Mg Tablet, 5 MG PO BID, (Reported)


Azithromycin 250 Mg Tablet, PO UD, (Reported)


   5 DAY SUPPLY FILLED 6-25-18 


Bisoprolol Fumarate 10 Mg Tablet, 10 MG PO BID, (Reported)


Cefdinir 300 Mg Capsule, 300 MG PO BID


   Prescribed by: TIA MOURA on 6/29/18 1109


Cholecalciferol (Vitamin D3) 2,000 Unit Capsule, 2,000 UNIT PO DAILY, (Reported)


Cyanocobalamin 1,000 Mcg/Ml Inj, 1,000 MCG IM EVERY 2 WEEKS, (Reported)


Docusate Sodium 100 Mg Capsule, 200 MG PO BID, (Reported)


Duloxetine HCl 60 Mg Capsule.dr, 60 MG PO HS, (Reported)


Ergocalciferol (Vitamin D2) 50,000 Unit Capsule, 50,000 UNITS PO Sa, (Reported)


Estradiol 1 Each Patch.tdsw, 1 PATCH TD EVERY 3 DAYS, (Reported)


Fexofenadine HCl 180 Mg Tablet, 180 MG PO DAILY PRN for ALLERGIES, (Reported)


Gabapentin 300 Mg Capsule, 300 MG PO HS, (Reported)


Hyoscyamine Sulfate 0.125 Mg Tab.subl, 0.125 MG PO Q6H PRN for SPASMS, (Reported

)


Levothyroxine Sodium 75 Mcg Tablet, 75 MCG PO DAILY, (Reported)


Liothyronine Sodium 5 Mcg Tablet, 5 MCG PO DAILY, (Reported)


Mesalamine 250 Mg Capsule.er, 250 MG PO DAILY, (Reported)


Pantoprazole Sodium 40 Mg Tablet.dr, 40 MG PO BID, (Reported)


Sennosides/Docusate Sodium 1 Each Tablet, 2 TAB PO BID PRN for CONSTIPATION-5TH 

LINE, (Reported)


Sitagliptin Phosphate 100 Mg Tablet, 100 MG PO DAILY, (Reported)





Patient Home Medication List


Home Medication List Reviewed:  Yes





Review of Systems


Review of Systems


Constitutional:  No chills, No diaphoresis


EENTM:  No hearing loss


Respiratory:  see HPI, cough, short of breath


Cardiovascular:  see HPI, chest pain


Gastrointestinal:  No abdominal pain, No diarrhea, No nausea, No vomiting


Genitourinary:  no symptoms reported


Pregnant:  No


Musculoskeletal:  No back pain


Skin:  No rash


Psychiatric/Neurological:  No Symptoms Reported


Hematologic/Lymphatic:  No Symptoms Reported


Immunological/Allergic:  no symptoms reported





Past Medical-Social-Family Hx


Past Med/Social Hx:  Reviewed Nursing Past Med/Soc Hx


Patient Social History


Recent Foreign Travel:  No


Contact w/Someone Who Travel:  No


Recent Infectious Disease Expo:  No


Recent Hopitalizations:  No





Immunizations Up To Date


Tetanus Booster (TDap):  Less than 5yrs


PED Vaccines UTD:  Yes


Date of Pneumonia Vaccine:  Sep 6, 2012


Date of Influenza Vaccine:  Nov 10, 2016





Seasonal Allergies


Seasonal Allergies:  No





Past Medical History


Surgeries:  Yes


Appendectomy, Breast, Cardiac, Gallbladder, Hysterectomy, Oophorectomy, 

Orthopedic, Thyroidectomy, Tonsillectomy


Respiratory:  Yes


Asthma


Currently Using CPAP:  No


Currently Using BIPAP:  No


Cardiac:  Yes (mitral valve prolapse, heart caths no stents)


Coronary Artery Disease, Heart Attack, Valvular Heart Disease


Neurological:  Yes


Reproductive Disorders:  Yes (TOTAL HYSTERECTOMY)


Female Reproductive Disorders:  Menstrual Problems, Endometriosis, Ovarian Cyst


GYN History:  Hysterectomy


Sexually Transmitted Disease:  No


HIV/AIDS:  No


Kidney Infection, Kidney Stones, UTI-Chronic


Gastrointestinal:  Yes (CHRONIC ABDOMINAL PAIN )


Gastroesophageal Reflux, Crohns Disease, Diverticulosis, Polyps, Hiatal Hernia, 

Ulcer, Gall Bladder Disease, Irritable Bowel


Musculoskeletal:  Yes (ANKLYLOSING SPONDYLITIS)


Degenerate Disk Disease, Arthritis, Chronic Back Pain


Endocrine:  Yes


Hypothyroidsim, Lupus


Loss of Vision:  Denies


Hearing Impairment:  Denies


Cancer:  No


Psychosocial:  No


Integumentary:  Yes (STAPH INFECTIONS)


Blood Disorders:  No


Adverse Reaction/Blood Tranf:  No





Family Medical History





Family history: Cardiovascular disease


Family history: Diabetes mellitus


  03 MOTHER


  09 BROTHER


Family history: Glaucoma


  03 MOTHER


Family history: Thyroid disorder


  03 MOTHER


Heart disease


  03 FATHER (AORTIC VALVE REPLACED)





Physical Exam





Vital Signs - First Documented








 2/9/19





 10:20


 


Temp 98.8


 


Pulse 70


 


Resp 18


 


B/P (MAP) 142/78 (99)


 


Pulse Ox 98





Capillary Refill : Less Than 3 Seconds


Height: 5'6.00"


Weight: 182lbs. 0.0oz. 82.305209zz; 29.5 BMI


Method:Stated


General Appearance:  WD/WN, no apparent distress


Eyes:  Bilateral Eye Normal Inspection


HEENT:  normal ENT inspection


Neck:  normal inspection


Respiratory:  lungs clear, normal breath sounds, no respiratory distress


Cardiovascular:  regular rate, rhythm, no murmur


Gastrointestinal:  normal bowel sounds, non tender


Extremities:  normal range of motion, non-tender


Neurologic/Psychiatric:  no motor/sensory deficits, alert, normal mood/affect


Skin:  normal color, warm/dry





Progress/Results/Core Measures


Suspected Sepsis


Recent Fever Within 48 Hours:  No


Infection Criteria Present:  None


New/Unexplained  Altered Menta:  No


Sepsis Screen:  No Definite Risk


SIRS


Temperature:98.8 


Pulse: 70 


Respiratory Rate: 18


 


Laboratory Tests


2/9/19 10:30: White Blood Count 7.7


Blood Pressure 142 /78 


Mean: 99


 


Laboratory Tests


2/9/19 10:30: 


Creatinine 0.80, INR Comment 1.0, Platelet Count 344, Total Bilirubin 0.2








Results/Orders


Lab Results





Laboratory Tests








Test


 2/9/19


10:30 2/9/19


10:33 Range/Units


 


 


White Blood Count


 7.7 


 


 4.3-11.0


10^3/uL


 


Red Blood Count


 4.19 L


 


 4.35-5.85


10^6/uL


 


Hemoglobin 13.3   11.5-16.0  G/DL


 


Hematocrit 38   35-52  %


 


Mean Corpuscular Volume 92   80-99  FL


 


Mean Corpuscular Hemoglobin 32   25-34  PG


 


Mean Corpuscular Hemoglobin


Concent 35 


 


 32-36  G/DL





 


Red Cell Distribution Width 14.3   10.0-14.5  %


 


Platelet Count


 344 


 


 130-400


10^3/uL


 


Mean Platelet Volume 9.3   7.4-10.4  FL


 


Neutrophils (%) (Auto) 73   42-75  %


 


Lymphocytes (%) (Auto) 21   12-44  %


 


Monocytes (%) (Auto) 5   0-12  %


 


Eosinophils (%) (Auto) 1   0-10  %


 


Basophils (%) (Auto) 0   0-10  %


 


Neutrophils # (Auto) 5.6   1.8-7.8  X 10^3


 


Lymphocytes # (Auto) 1.6   1.0-4.0  X 10^3


 


Monocytes # (Auto) 0.4   0.0-1.0  X 10^3


 


Eosinophils # (Auto)


 0.0 


 


 0.0-0.3


10^3/uL


 


Basophils # (Auto)


 0.0 


 


 0.0-0.1


10^3/uL


 


Prothrombin Time 12.6   12.2-14.7  SEC


 


INR Comment 1.0   0.8-1.4  


 


Sodium Level 135   135-145  MMOL/L


 


Potassium Level 4.2   3.6-5.0  MMOL/L


 


Chloride Level 101     MMOL/L


 


Carbon Dioxide Level 20 L  21-32  MMOL/L


 


Anion Gap 14   5-14  MMOL/L


 


Blood Urea Nitrogen 12   7-18  MG/DL


 


Creatinine


 0.80 


 


 0.60-1.30


MG/DL


 


Estimat Glomerular Filtration


Rate > 60 


 


  





 


BUN/Creatinine Ratio 15    


 


Glucose Level 210 H    MG/DL


 


Calcium Level 9.0   8.5-10.1  MG/DL


 


Corrected Calcium 8.8   8.5-10.1  MG/DL


 


Magnesium Level 2.1   1.8-2.4  MG/DL


 


Total Bilirubin 0.2   0.1-1.0  MG/DL


 


Aspartate Amino Transf


(AST/SGOT) 21 


 


 5-34  U/L





 


Alanine Aminotransferase


(ALT/SGPT) 18 


 


 0-55  U/L





 


Alkaline Phosphatase 73     U/L


 


Myoglobin


 22.0 


 


 10.0-92.0


NG/ML


 


Troponin I < 0.028   <0.028  NG/ML


 


Total Protein 7.7   6.4-8.2  GM/DL


 


Albumin 4.3   3.2-4.5  GM/DL


 


D-Dimer


 


 0.38 


 0.00-0.49


UG/ML


 


B-Type Natriuretic Peptide  63.3  <100.0  PG/ML








My Orders





Orders - RICHY, RAJEEV FUNES MD


Ekg Tracing (2/9/19 10:33)


Cbc With Automated Diff (2/9/19 10:36)


Magnesium (2/9/19 10:36)


Cardiac Profile 1 (2/9/19 10:36)


Comprehensive Metabolic Panel (2/9/19 10:36)


Myoglobin Serum (2/9/19 10:36)


Protime With Inr (2/9/19 10:36)


Chest Pa/Lat (2 View) (2/9/19 10:41)


Fibrin Degradation Products (2/9/19 11:30)


BNP (2/9/19 11:30)





Vital Signs/I&O











 2/9/19





 10:20


 


Temp 98.8


 


Pulse 70


 


Resp 18


 


B/P (MAP) 142/78 (99)


 


Pulse Ox 98





Capillary Refill : Less Than 3 Seconds








Blood Pressure Mean:  99








Progress Note :  


   Time:  12:56


Progress Note


The patient's evaluation included a normal d-dimer.  The patient's CBC was 

unremarkable.





Patient's chest x-ray although representing interval improvement on the right 

side was still consistent from the radiologist perspective with a pneumonia.





I discussed findings with patient and I have called and left a message with Dr. MOURA.





I gave the patient Levaquin 500 mg daily and asked that she follow-up closely 

with Dr. Moura on Monday.  She was invited return if any problems or 

questions.





Departure


Communication (Admissions)


1 p.m.  I called and left a message with Dr. MOURA.





Impression





 Primary Impression:  


 Fungal infection


Disposition:  01 HOME, SELF-CARE


Condition:  Unchanged





Departure-Patient Inst.


Decision time for Depature:  12:59


Referrals:  


TIA MOURA DO (PCP/Family)


Primary Care Physician


Patient Instructions:  Chest Pain (DC)





Add. Discharge Instructions:  


Levaquin as prescribed.  Close follow-up with Dr. MOURA on Monday.  Return if 

any problems or questions.  All discharge instructions reviewed with patient and

/or family. Voiced understanding.











RAJEEV LOCKHART MD Feb 9, 2019 12:39

## 2019-02-09 NOTE — DIAGNOSTIC IMAGING REPORT
INDICATION: Shortness of air.



Time of exam: 1100 a.m.



Correlation is made with prior study of 07/23/2018.



Heart size normal. There is some minimal residual density in the

right base on today's study. Left lung fairly clear. There is no

effusion or pneumothorax seen.



IMPRESSION: There is some density in the right mid to lower lung

field, much less prominent than the chest radiograph from

07/23/2018. This may represent an area of pneumonia. Continued

followup to confirm clearing is recommended.



Dictated by: 



  Dictated on workstation # [id number]

## 2019-02-09 NOTE — XMS REPORT
Clinical Summary

 Created on: 2019



Amalia Sommers

External Reference #: [id number]

: 1955

Sex: Female



Demographics







 Address  [address] [place]  [address]

 

 Home Phone  [phone]

 

 Preferred Language  English

 

 Marital Status  Unknown

 

 Jainism Affiliation  CHR

 

 Race  White

 

 Ethnic Group  Not  or 





Author







 Author  [facility]

 

 Organization  [facility]

 

 Address  Unknown

 

 Phone  Unavailable







Support







 Name  Relationship  Address  Phone

 

 Rudi Sommers  [employer]  -

Unknown  [phone]







Care Team Providers







 Care Team Member Name  Role  Phone

 

 Fahad Silver MD  Unavailable  [phone]

 

 Shazia Estevez  Unavailable  Unavailable

 

 Daryl Shetty MD  Unavailable  [phone]

 

 Zuleika Slater MD  PCP  [phone]

 

 Aleja Sesay RN  Unavailable  Unavailable

 

 Milligan, Beth RN  Unavailable  Unavailable

 

 Heaven Baumann DO  Unavailable  [phone]

 

 Christa Kraus  Unavailable  Unavailable

 

 Darlene Luis RN  Unavailable  Unavailable

 

 Kay Mcarthur RN  Unavailable  Unavailable







Source Comments

Some departments are not documenting in the electronic medical record.  If you 
do not see the information that you expected, contact Release of Information in 
the Health Information Management department at [phone] for further 
assistance in locating additional records.[facility]



Allergies







     Comments



  Active Allergy   Reactions   Severity   Noted Date 

 

      



  Amitriptyline   JOINT PAIN    2011 

 

      



  Nizatidine   HIVES, EDEMA    2011 

 

      



  Clarithromycin   ANAPHYLAXIS    2011 

 

     



Severe stomach pains and vomiting



  Erythromycin   SEE COMMENTS    2011 

 

      



  Nitrofurantoin   HIVES    2011 



  Macrocrystalline    

 

      



  Nitrofurantoin   UNKNOWN    2011 

 

      



  Penicillins   ANAPHYLAXIS,    2011 



   HIVES   

 

      



  Conjugated Estrogens   JOINT PAIN,    2011 



   MUSCLE PAIN   

 

      



  Omeprazole Magnesium   DIARRHEA    2011 

 

      



  Metoclopramide   JOINT PAIN    2011 

 

      



  Sulfa (Sulfonamide   ANAPHYLAXIS,    2011 



  Antibiotics)   HIVES   

 

      



  Terfenadine   HIVES,    2011 



   ANGIOEDEMA   







Medications







      End Date  Status



  Medication   Sig   Dispensed   Refills   Start  



      Date  

 

         Active



  cyanocobalamin (VITAMIN   Inject 1,000    0   



  B-12, RUBRAMIN) 1,000   mcg to     



  mcg/mL IJ injection   area(s) as     



   directed     



   every 30     



   days.     

 

         Active



  bisoprolol (ZEBETA) 5 mg   Take 10 mg by    0   



  PO tablet   mouth twice     



   daily (at     



   10AM and     



   10PM).     

 

         Active



  estradiol(+)   Apply 1 Patch    0   



  (VIVELLE-DOT) 0.1 mg/day   to top of     



  TD patch   skin as     



   directed     



   twice weekly.     

 

         Active



  ergocalciferol (VITAMIN   Take 50,000    0   



  D) 50,000 unit capsule   Units by     



   mouth every 7     



   days. Takes     



   on      

 

         Active



  DULoxetine DR (CYMBALTA)   Take 60 mg by    0   



  60 mg capsule   mouth daily.     

 

         Active



  levothyroxine (SYNTHROID)   Take 75 mcg    0   



  75 mcg tablet   by mouth     



   daily. Brand     



   name only     

 

         Active



  gabapentin (NEURONTIN)   Take 300 mg    0   



  300 mg capsule   by mouth     



   every 8     



   hours.     

 

         Active



  liothyronine (CYTOMEL) 5   Take 5 mcg by    0   



  mcg tab   mouth daily.     

 

         Active



  amLODIPine (NORVASC) 5 mg   Take 5 mg by    0   



  tablet   mouth daily.     

 

         Active



  warfarin sodium (JANTOVEN   Take  by    0   



  PO)   mouth.     

 

         Active



  mesalamine (PENTASA PO)   Take  by    0   



   mouth.     







Active Problems







 



  Problem   Noted Date

 

 



  Renal mass   2014

 

 



  Last Assessment & Plan:



  The patient presents today for follow-up of a renal mass that we have been



  following.  Ultrasound today shows stability in the size of the mass from 2



  years ago, currently 0.8 cm in diameter.  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



  is a benign angiomyolipoma.  Given that the mass has not changed



  significantly in size over a two-year period, and is actually decreased



  slightly in size, we believe that this lung mass is most likely benign.





  PLAN:



  -RTC in 2 years for U/S and follow-up appointment.

 

 



  Renal oncocytoma   2014

 

 



  Overview:



  Incidentally found on imaging for ankylosing spondylitis,  Approximately



  1.2cm x 1.2cm.



  Completely asymptomatic.





  3/17/2014: Kidney, "left renal mass", partial nephrectomy:



  Renal oncocytoma





  2015: lt kidney wnl. Right with small mass 1cm.





  2016:



  1. Prior partial left nephrectomy without recurrent left renal mass.





  2. Stable hyperechoic area in or adjacent to the lower pole right kidney



  which is most compatible with a benign angiomyolipoma or tiny nodular area



  of perinephric fat.





  3. Persistent diffuse hepatic steatosis.





  L



  ast Assessment & Plan:



  Doing well



  Stable mass



  RTC 2 years with PAUL Brito MD

 

 



  Thoracic or lumbosacral neuritis or radiculitis, unspecified   2011

 

 



  Displacement of intervertebral disc, site unspecified, without myelopathy   

 

 



  Chronic low back pain   2011

 

 



  Spondyloarthropathy   2011

 

 



  Radiculopathy   2011

 

 



  Osteoarthritis   2011

 

 



  Encounter for long-term (current) use of medications   2011

 

 



  Ankylosing spondylitis   2011

 

 



  Sicca syndrome, Sjogren's   2011

 

 



  Encounter for long-term (current) use of other medications   2011







Family History







   



  Medical History   Relation   Name   Comments

 

   



  Arthritis-osteo   Daughter  

 

   



  Heart Disease   Father  

 

   



  Arthritis-rheumatoid   Maternal  



   Grandmother  

 

   



  Arthritis-rheumatoid   Mother  

 

   



  Kidney Cancer   Paternal  



   Grandmother  









   



  Relation   Name   Status   Comments

 

   



  Daughter   

 

   



  Father   

 

   



  Maternal Grandmother   

 

   



  Mother   

 

   



  Paternal Grandmother   







Social History







     Date



  Tobacco Use   Types   Packs/Day   Years Used 

 

      



  Never Smoker    

 

    



  Smokeless Tobacco: Never   



  Used   









   



  Alcohol Use   Drinks/Week   oz/Week   Comments

 

   



  No   









 



  Sex Assigned at Birth   Date Recorded

 

 



  Not on file 









   Industry



  Job Start Date   Occupation 

 

   Not on file



  Not on file   Not on file 









   Travel End



  Travel History   Travel Start 













  No recent travel history available.







Last Filed Vital Signs







   Time Taken



  Vital Sign   Reading 

 

   2016  9:59 AM CDT



  Blood Pressure   132/63 

 

   2016  9:59 AM CDT



  Pulse   71 

 

   10/12/2015  1:18 PM CDT



  Temperature   36.7 C (98.1 F) 

 

   2015  9:07 AM CDT



  Respiratory Rate   16 

 

   10/12/2015  2:25 PM CDT



  Oxygen Saturation   96% 

 

   -



  Inhaled Oxygen   - 



  Concentration  

 

   2016  9:59 AM CDT



  Weight   88.3 kg (194 lb 9.6 oz) 

 

   2016  9:59 AM CDT



  Height   168.9 cm (5' 6.5") 

 

   2016  9:59 AM CDT



  Body Mass Index   30.94 







Plan of Treatment







   



  Health Maintenance   Due Date   Last Done   Comments

 

   



  HEPATITIS C SCREENING   1955  

 

   



  PHYSICAL (COMPREHENSIVE)   1962  



  EXAM   

 

   



  HIV SCREENING   1970  

 

   



  DTAP/TDAP VACCINES (1 -   1973  



  Tdap)   

 

   



  CERVICAL CANCER SCREENING   1985  

 

   



  BREAST CANCER SCREENING   1995  

 

   



  SHINGLES RECOMBINANT   2005  



  VACCINE (1 of 2)   

 

   



  INFLUENZA VACCINE   2018  

 

   



  COLORECTAL CANCER   10/12/2025   10/12/2015 



  SCREENING   







Results

Not on filefrom Last 3 Months



Insurance







     



  Payer   Benefit   Subscriber ID   Type   Phone   Address



   Plan /    



   Group    

 

     



  [employer] [facility]   xxxxxxxxxxxx   PPO  



   [facility] CARE    



   BLUE    









     



  Guarantor Name   Account   Relation to   Date of   Phone   Billing Address



   Type   Patient   Birth  

 

     



  Amalia Sommers   Personal/F   Self   1955   [phone]   [address] [place] [address]     (Home)   [place] [address] [phone] 



      (Work) 







Advance Directives





Patient has advance care planning documents, and code status on file. For more 
information, please contact:



[facility]



[address]



[place] [address]









   Date Inactivated  Comments



  Code Status   Date Activated  

 

   3/18/2014  2:52 PM   



  Full Code   3/17/2014  7:26 PM  









  



  Provider has discussed Code Status   No, more discussion 



  w/Patient or Family?   needed

## 2019-06-18 ENCOUNTER — APPOINTMENT (RX ONLY)
Dept: URBAN - METROPOLITAN AREA CLINIC 51 | Facility: CLINIC | Age: 64
Setting detail: DERMATOLOGY
End: 2019-06-18

## 2019-06-18 DIAGNOSIS — L738 OTHER SPECIFIED DISEASES OF HAIR AND HAIR FOLLICLES: ICD-10-CM

## 2019-06-18 DIAGNOSIS — L73.9 FOLLICULAR DISORDER, UNSPECIFIED: ICD-10-CM

## 2019-06-18 DIAGNOSIS — L663 OTHER SPECIFIED DISEASES OF HAIR AND HAIR FOLLICLES: ICD-10-CM

## 2019-06-18 PROBLEM — L02.821 FURUNCLE OF HEAD [ANY PART, EXCEPT FACE]: Status: ACTIVE | Noted: 2019-06-18

## 2019-06-18 PROCEDURE — ? OTHER

## 2019-06-18 PROCEDURE — ? MEDICATION COUNSELING

## 2019-06-18 PROCEDURE — 99213 OFFICE O/P EST LOW 20 MIN: CPT

## 2019-06-18 PROCEDURE — ? PRESCRIPTION

## 2019-06-18 PROCEDURE — ? COUNSELING

## 2019-06-18 RX ORDER — CLOBETASOL PROPIONATE 0.5 MG/ML
SOLUTION TOPICAL
Qty: 50 | Refills: 1 | Status: ERX | COMMUNITY
Start: 2019-06-18

## 2019-06-18 RX ORDER — MINOCYCLINE HYDROCHLORIDE 100 MG/1
CAPSULE ORAL
Qty: 60 | Refills: 2 | Status: ERX | COMMUNITY
Start: 2019-06-18

## 2019-06-18 RX ADMIN — MINOCYCLINE HYDROCHLORIDE: 100 CAPSULE ORAL at 17:56

## 2019-06-18 RX ADMIN — CLOBETASOL PROPIONATE: 0.5 SOLUTION TOPICAL at 17:56

## 2019-06-18 ASSESSMENT — LOCATION DETAILED DESCRIPTION DERM: LOCATION DETAILED: RIGHT SUPERIOR PARIETAL SCALP

## 2019-06-18 ASSESSMENT — LOCATION ZONE DERM: LOCATION ZONE: SCALP

## 2019-06-18 ASSESSMENT — LOCATION SIMPLE DESCRIPTION DERM: LOCATION SIMPLE: SCALP

## 2019-06-18 NOTE — PROCEDURE: MEDICATION COUNSELING
Topical Sulfur Applications Counseling: Topical Sulfur Counseling: Patient counseled that this medication may cause skin irritation or allergic reactions.  In the event of skin irritation, the patient was advised to reduce the amount of the drug applied or use it less frequently.   The patient verbalized understanding of the proper use and possible adverse effects of topical sulfur application.  All of the patient's questions and concerns were addressed.
Doxepin Pregnancy And Lactation Text: This medication is Pregnancy Category C and it isn't known if it is safe during pregnancy. It is also excreted in breast milk and breast feeding isn't recommended.
Birth Control Pills Pregnancy And Lactation Text: This medication should be avoided if pregnant and for the first 30 days post-partum.
Imiquimod Pregnancy And Lactation Text: This medication is Pregnancy Category C. It is unknown if this medication is excreted in breast milk.
Azithromycin Counseling:  I discussed with the patient the risks of azithromycin including but not limited to GI upset, allergic reaction, drug rash, diarrhea, and yeast infections.
High Dose Vitamin A Counseling: Side effects reviewed, pt to contact office should one occur.
Enbrel Pregnancy And Lactation Text: This medication is Pregnancy Category B and is considered safe during pregnancy. It is unknown if this medication is excreted in breast milk.
Azathioprine Counseling:  I discussed with the patient the risks of azathioprine including but not limited to myelosuppression, immunosuppression, hepatotoxicity, lymphoma, and infections.  The patient understands that monitoring is required including baseline LFTs, Creatinine, possible TPMP genotyping and weekly CBCs for the first month and then every 2 weeks thereafter.  The patient verbalized understanding of the proper use and possible adverse effects of azathioprine.  All of the patient's questions and concerns were addressed.
Erivedge Pregnancy And Lactation Text: This medication is Pregnancy Category X and is absolutely contraindicated during pregnancy. It is unknown if it is excreted in breast milk.
Rifampin Counseling: I discussed with the patient the risks of rifampin including but not limited to liver damage, kidney damage, red-orange body fluids, nausea/vomiting and severe allergy.
Azithromycin Pregnancy And Lactation Text: This medication is considered safe during pregnancy and is also secreted in breast milk.
Gabapentin Counseling: I discussed with the patient the risks of gabapentin including but not limited to dizziness, somnolence, fatigue and ataxia.
High Dose Vitamin A Pregnancy And Lactation Text: High dose vitamin A therapy is contraindicated during pregnancy and breast feeding.
Topical Sulfur Applications Pregnancy And Lactation Text: This medication is Pregnancy Category C and has an unknown safety profile during pregnancy. It is unknown if this topical medication is excreted in breast milk.
Azathioprine Pregnancy And Lactation Text: This medication is Pregnancy Category D and isn't considered safe during pregnancy. It is unknown if this medication is excreted in breast milk.
Minoxidil Counseling: Minoxidil is a topical medication which can increase blood flow where it is applied. It is uncertain how this medication increases hair growth. Side effects are uncommon and include stinging and allergic reactions.
Spironolactone Counseling: Patient advised regarding risks of diarrhea, abdominal pain, hyperkalemia, birth defects (for female patients), liver toxicity and renal toxicity. The patient may need blood work to monitor liver and kidney function and potassium levels while on therapy. The patient verbalized understanding of the proper use and possible adverse effects of spironolactone.  All of the patient's questions and concerns were addressed.
Humira Counseling:  I discussed with the patient the risks of adalimumab including but not limited to myelosuppression, immunosuppression, autoimmune hepatitis, demyelinating diseases, lymphoma, and serious infections.  The patient understands that monitoring is required including a PPD at baseline and must alert us or the primary physician if symptoms of infection or other concerning signs are noted.
Minoxidil Pregnancy And Lactation Text: This medication has not been assigned a Pregnancy Risk Category but animal studies failed to show danger with the topical medication. It is unknown if the medication is excreted in breast milk.
Spironolactone Pregnancy And Lactation Text: This medication can cause feminization of the male fetus and should be avoided during pregnancy. The active metabolite is also found in breast milk.
Rifampin Pregnancy And Lactation Text: This medication is Pregnancy Category C and it isn't know if it is safe during pregnancy. It is also excreted in breast milk and should not be used if you are breast feeding.
Hydroxyzine Counseling: Patient advised that the medication is sedating and not to drive a car after taking this medication.  Patient informed of potential adverse effects including but not limited to dry mouth, urinary retention, and blurry vision.  The patient verbalized understanding of the proper use and possible adverse effects of hydroxyzine.  All of the patient's questions and concerns were addressed.
Ilumya Counseling: I discussed with the patient the risks of tildrakizumab including but not limited to immunosuppression, malignancy, posterior leukoencephalopathy syndrome, and serious infections.  The patient understands that monitoring is required including a PPD at baseline and must alert us or the primary physician if symptoms of infection or other concerning signs are noted.
Bactrim Counseling:  I discussed with the patient the risks of sulfa antibiotics including but not limited to GI upset, allergic reaction, drug rash, diarrhea, dizziness, photosensitivity, and yeast infections.  Rarely, more serious reactions can occur including but not limited to aplastic anemia, agranulocytosis, methemoglobinemia, blood dyscrasias, liver or kidney failure, lung infiltrates or desquamative/blistering drug rashes.
Cellcept Counseling:  I discussed with the patient the risks of mycophenolate mofetil including but not limited to infection/immunosuppression, GI upset, hypokalemia, hypercholesterolemia, bone marrow suppression, lymphoproliferative disorders, malignancy, GI ulceration/bleed/perforation, colitis, interstitial lung disease, kidney failure, progressive multifocal leukoencephalopathy, and birth defects.  The patient understands that monitoring is required including a baseline creatinine and regular CBC testing. In addition, patient must alert us immediately if symptoms of infection or other concerning signs are noted.
Gabapentin Pregnancy And Lactation Text: This medication is Pregnancy Category C and isn't considered safe during pregnancy. It is excreted in breast milk.
Tetracycline Pregnancy And Lactation Text: This medication is Pregnancy Category D and not consider safe during pregnancy. It is also excreted in breast milk.
Wartpeel Counseling:  I discussed with the patient the risks of Wartpeel including but not limited to erythema, scaling, itching, weeping, crusting, and pain.
Tetracycline Counseling: Patient counseled regarding possible photosensitivity and increased risk for sunburn.  Patient instructed to avoid sunlight, if possible.  When exposed to sunlight, patients should wear protective clothing, sunglasses, and sunscreen.  The patient was instructed to call the office immediately if the following severe adverse effects occur:  hearing changes, easy bruising/bleeding, severe headache, or vision changes.  The patient verbalized understanding of the proper use and possible adverse effects of tetracycline.  All of the patient's questions and concerns were addressed. Patient understands to avoid pregnancy while on therapy due to potential birth defects.
Wartpeel Pregnancy And Lactation Text: This medication is Pregnancy Category X and contraindicated in pregnancy and in women who may become pregnant. It is unknown if this medication is excreted in breast milk.
Hydroxyzine Pregnancy And Lactation Text: This medication is not safe during pregnancy and should not be taken. It is also excreted in breast milk and breast feeding isn't recommended.
SSKI Counseling:  I discussed with the patient the risks of SSKI including but not limited to thyroid abnormalities, metallic taste, GI upset, fever, headache, acne, arthralgias, paraesthesias, lymphadenopathy, easy bleeding, arrhythmias, and allergic reaction.
Mirvaso Counseling: Mirvaso is a topical medication which can decrease superficial blood flow where applied. Side effects are uncommon and include stinging, redness and allergic reactions.
Ilumya Pregnancy And Lactation Text: The risk during pregnancy and breastfeeding is uncertain with this medication.
Bactrim Pregnancy And Lactation Text: This medication is Pregnancy Category D and is known to cause fetal risk.  It is also excreted in breast milk.
Benzoyl Peroxide Counseling: Patient counseled that medicine may cause skin irritation and bleach clothing.  In the event of skin irritation, the patient was advised to reduce the amount of the drug applied or use it less frequently.   The patient verbalized understanding of the proper use and possible adverse effects of benzoyl peroxide.  All of the patient's questions and concerns were addressed.
Glycopyrrolate Counseling:  I discussed with the patient the risks of glycopyrrolate including but not limited to skin rash, drowsiness, dry mouth, difficulty urinating, and blurred vision.
Albendazole Counseling:  I discussed with the patient the risks of albendazole including but not limited to cytopenia, kidney damage, nausea/vomiting and severe allergy.  The patient understands that this medication is being used in an off-label manner.
Benzoyl Peroxide Pregnancy And Lactation Text: This medication is Pregnancy Category C. It is unknown if benzoyl peroxide is excreted in breast milk.
Zyclara Counseling:  I discussed with the patient the risks of imiquimod including but not limited to erythema, scaling, itching, weeping, crusting, and pain.  Patient understands that the inflammatory response to imiquimod is variable from person to person and was educated regarded proper titration schedule.  If flu-like symptoms develop, patient knows to discontinue the medication and contact us.
Cyclophosphamide Counseling:  I discussed with the patient the risks of cyclophosphamide including but not limited to hair loss, hormonal abnormalities, decreased fertility, abdominal pain, diarrhea, nausea and vomiting, bone marrow suppression and infection. The patient understands that monitoring is required while taking this medication.
Mirvaso Pregnancy And Lactation Text: This medication has not been assigned a Pregnancy Risk Category. It is unknown if the medication is excreted in breast milk.
Sski Pregnancy And Lactation Text: This medication is Pregnancy Category D and isn't considered safe during pregnancy. It is excreted in breast milk.
Picato Counseling:  I discussed with the patient the risks of Picato including but not limited to erythema, scaling, itching, weeping, crusting, and pain.
Fluconazole Counseling:  Patient counseled regarding adverse effects of fluconazole including but not limited to headache, diarrhea, nausea, upset stomach, liver function test abnormalities, taste disturbance, and stomach pain.  There is a rare possibility of liver failure that can occur when taking fluconazole.  The patient understands that monitoring of LFTs and kidney function test may be required, especially at baseline. The patient verbalized understanding of the proper use and possible adverse effects of fluconazole.  All of the patient's questions and concerns were addressed.
Thalidomide Counseling: I discussed with the patient the risks of thalidomide including but not limited to birth defects, anxiety, weakness, chest pain, dizziness, cough and severe allergy.
Albendazole Pregnancy And Lactation Text: This medication is Pregnancy Category C and it isn't known if it is safe during pregnancy. It is also excreted in breast milk.
Glycopyrrolate Pregnancy And Lactation Text: This medication is Pregnancy Category B and is considered safe during pregnancy. It is unknown if it is excreted breast milk.
Carac Counseling:  I discussed with the patient the risks of Carac including but not limited to erythema, scaling, itching, weeping, crusting, and pain.
Cyclophosphamide Pregnancy And Lactation Text: This medication is Pregnancy Category D and it isn't considered safe during pregnancy. This medication is excreted in breast milk.
Cephalexin Counseling: I counseled the patient regarding use of cephalexin as an antibiotic for prophylactic and/or therapeutic purposes. Cephalexin (commonly prescribed under brand name Keflex) is a cephalosporin antibiotic which is active against numerous classes of bacteria, including most skin bacteria. Side effects may include nausea, diarrhea, gastrointestinal upset, rash, hives, yeast infections, and in rare cases, hepatitis, kidney disease, seizures, fever, confusion, neurologic symptoms, and others. Patients with severe allergies to penicillin medications are cautioned that there is about a 10% incidence of cross-reactivity with cephalosporins. When possible, patients with penicillin allergies should use alternatives to cephalosporins for antibiotic therapy.
Infliximab Counseling:  I discussed with the patient the risks of infliximab including but not limited to myelosuppression, immunosuppression, autoimmune hepatitis, demyelinating diseases, lymphoma, and serious infections.  The patient understands that monitoring is required including a PPD at baseline and must alert us or the primary physician if symptoms of infection or other concerning signs are noted.
Ivermectin Counseling:  Patient instructed to take medication on an empty stomach with a full glass of water.  Patient informed of potential adverse effects including but not limited to nausea, diarrhea, dizziness, itching, and swelling of the extremities or lymph nodes.  The patient verbalized understanding of the proper use and possible adverse effects of ivermectin.  All of the patient's questions and concerns were addressed.
Cyclosporine Counseling:  I discussed with the patient the risks of cyclosporine including but not limited to hypertension, gingival hyperplasia,myelosuppression, immunosuppression, liver damage, kidney damage, neurotoxicity, lymphoma, and serious infections. The patient understands that monitoring is required including baseline blood pressure, CBC, CMP, lipid panel and uric acid, and then 1-2 times monthly CMP and blood pressure.
Hydroxychloroquine Counseling:  I discussed with the patient that a baseline ophthalmologic exam is needed at the start of therapy and every year thereafter while on therapy. A CBC may also be warranted for monitoring.  The side effects of this medication were discussed with the patient, including but not limited to agranulocytosis, aplastic anemia, seizures, rashes, retinopathy, and liver toxicity. Patient instructed to call the office should any adverse effect occur.  The patient verbalized understanding of the proper use and possible adverse effects of Plaquenil.  All the patient's questions and concerns were addressed.
Hydroxychloroquine Pregnancy And Lactation Text: This medication has been shown to cause fetal harm but it isn't assigned a Pregnancy Risk Category. There are small amounts excreted in breast milk.
Cephalexin Pregnancy And Lactation Text: This medication is Pregnancy Category B and considered safe during pregnancy.  It is also excreted in breast milk but can be used safely for shorter doses.
Fluconazole Pregnancy And Lactation Text: This medication is Pregnancy Category C and it isn't know if it is safe during pregnancy. It is also excreted in breast milk.
Griseofulvin Counseling:  I discussed with the patient the risks of griseofulvin including but not limited to photosensitivity, cytopenia, liver damage, nausea/vomiting and severe allergy.  The patient understands that this medication is best absorbed when taken with a fatty meal (e.g., ice cream or french fries).
Opioid Counseling: I discussed with the patient the potential side effects of opioids including but not limited to addiction, altered mental status, and depression. I stressed avoiding alcohol, benzodiazepines, muscle relaxants and sleep aids unless specifically okayed by a physician. The patient verbalized understanding of the proper use and possible adverse effects of opioids. All of the patient's questions and concerns were addressed. They were instructed to flush the remaining pills down the toilet if they did not need them for pain.
Cyclosporine Pregnancy And Lactation Text: This medication is Pregnancy Category C and it isn't know if it is safe during pregnancy. This medication is excreted in breast milk.
Protopic Counseling: Patient may experience a mild burning sensation during topical application. Protopic is not approved in children less than 2 years of age. There have been case reports of hematologic and skin malignancies in patients using topical calcineurin inhibitors although causality is questionable.
Valtrex Counseling: I discussed with the patient the risks of valacyclovir including but not limited to kidney damage, nausea, vomiting and severe allergy.  The patient understands that if the infection seems to be worsening or is not improving, they are to call.
Rituxan Pregnancy And Lactation Text: This medication is Pregnancy Category C and it isn't know if it is safe during pregnancy. It is unknown if this medication is excreted in breast milk but similar antibodies are known to be excreted.
Clindamycin Pregnancy And Lactation Text: This medication can be used in pregnancy if certain situations. Clindamycin is also present in breast milk.
Valtrex Pregnancy And Lactation Text: this medication is Pregnancy Category B and is considered safe during pregnancy. This medication is not directly found in breast milk but it's metabolite acyclovir is present.
Clindamycin Counseling: I counseled the patient regarding use of clindamycin as an antibiotic for prophylactic and/or therapeutic purposes. Clindamycin is active against numerous classes of bacteria, including skin bacteria. Side effects may include nausea, diarrhea, gastrointestinal upset, rash, hives, yeast infections, and in rare cases, colitis.
Protopic Pregnancy And Lactation Text: This medication is Pregnancy Category C. It is unknown if this medication is excreted in breast milk when applied topically.
Rituxan Counseling:  I discussed with the patient the risks of Rituxan infusions. Side effects can include infusion reactions, severe drug rashes including mucocutaneous reactions, reactivation of latent hepatitis and other infections and rarely progressive multifocal leukoencephalopathy.  All of the patient's questions and concerns were addressed.
Niacinamide Counseling: I recommended taking niacin or niacinamide, also know as vitamin B3, twice daily. Recent evidence suggests that taking vitamin B3 (500 mg twice daily) can reduce the risk of actinic keratoses and non-melanoma skin cancers. Side effects of vitamin B3 include flushing and headache.
5-Fu Counseling: 5-Fluorouracil Counseling:  I discussed with the patient the risks of 5-fluorouracil including but not limited to erythema, scaling, itching, weeping, crusting, and pain.
Opioid Pregnancy And Lactation Text: These medications can lead to premature delivery and should be avoided during pregnancy. These medications are also present in breast milk in small amounts.
Siliq Counseling:  I discussed with the patient the risks of Siliq including but not limited to new or worsening depression, suicidal thoughts and behavior, immunosuppression, malignancy, posterior leukoencephalopathy syndrome, and serious infections.  The patient understands that monitoring is required including a PPD at baseline and must alert us or the primary physician if symptoms of infection or other concerning signs are noted. There is also a special program designed to monitor depression which is required with Siliq.
Doxycycline Counseling:  Patient counseled regarding possible photosensitivity and increased risk for sunburn.  Patient instructed to avoid sunlight, if possible.  When exposed to sunlight, patients should wear protective clothing, sunglasses, and sunscreen.  The patient was instructed to call the office immediately if the following severe adverse effects occur:  hearing changes, easy bruising/bleeding, severe headache, or vision changes.  The patient verbalized understanding of the proper use and possible adverse effects of doxycycline.  All of the patient's questions and concerns were addressed.
Methotrexate Counseling:  Patient counseled regarding adverse effects of methotrexate including but not limited to nausea, vomiting, abnormalities in liver function tests. Patients may develop mouth sores, rash, diarrhea, and abnormalities in blood counts. The patient understands that monitoring is required including LFT's and blood counts.  There is a rare possibility of scarring of the liver and lung problems that can occur when taking methotrexate. Persistent nausea, loss of appetite, pale stools, dark urine, cough, and shortness of breath should be reported immediately. Patient advised to discontinue methotrexate treatment at least three months before attempting to become pregnant.  I discussed the need for folate supplements while taking methotrexate.  These supplements can decrease side effects during methotrexate treatment. The patient verbalized understanding of the proper use and possible adverse effects of methotrexate.  All of the patient's questions and concerns were addressed.
Rhofade Counseling: Rhofade is a topical medication which can decrease superficial blood flow where applied. Side effects are uncommon and include stinging, redness and allergic reactions.
Niacinamide Pregnancy And Lactation Text: These medications are considered safe during pregnancy.
Griseofulvin Pregnancy And Lactation Text: This medication is Pregnancy Category X and is known to cause serious birth defects. It is unknown if this medication is excreted in breast milk but breast feeding should be avoided.
Drysol Counseling:  I discussed with the patient the risks of drysol/aluminum chloride including but not limited to skin rash, itching, irritation, burning.
Nsaids Counseling: NSAID Counseling: I discussed with the patient that NSAIDs should be taken with food. Prolonged use of NSAIDs can result in the development of stomach ulcers.  Patient advised to stop taking NSAIDs if abdominal pain occurs.  The patient verbalized understanding of the proper use and possible adverse effects of NSAIDs.  All of the patient's questions and concerns were addressed.
Doxycycline Pregnancy And Lactation Text: This medication is Pregnancy Category D and not consider safe during pregnancy. It is also excreted in breast milk but is considered safe for shorter treatment courses.
Methotrexate Pregnancy And Lactation Text: This medication is Pregnancy Category X and is known to cause fetal harm. This medication is excreted in breast milk.
Use Enhanced Medication Counseling?: No
Itraconazole Counseling:  I discussed with the patient the risks of itraconazole including but not limited to liver damage, nausea/vomiting, neuropathy, and severe allergy.  The patient understands that this medication is best absorbed when taken with acidic beverages such as non-diet cola or ginger ale.  The patient understands that monitoring is required including baseline LFTs and repeat LFTs at intervals.  The patient understands that they are to contact us or the primary physician if concerning signs are noted.
Solaraze Counseling:  I discussed with the patient the risks of Solaraze including but not limited to erythema, scaling, itching, weeping, crusting, and pain.
Nsaids Pregnancy And Lactation Text: These medications are considered safe up to 30 weeks gestation. It is excreted in breast milk.
Drysol Pregnancy And Lactation Text: This medication is considered safe during pregnancy and breast feeding.
Arava Counseling:  Patient counseled regarding adverse effects of Arava including but not limited to nausea, vomiting, abnormalities in liver function tests. Patients may develop mouth sores, rash, diarrhea, and abnormalities in blood counts. The patient understands that monitoring is required including LFTs and blood counts.  There is a rare possibility of scarring of the liver and lung problems that can occur when taking methotrexate. Persistent nausea, loss of appetite, pale stools, dark urine, cough, and shortness of breath should be reported immediately. Patient advised to discontinue Arava treatment and consult with a physician prior to attempting conception. The patient will have to undergo a treatment to eliminate Arava from the body prior to conception.
Prednisone Counseling:  I discussed with the patient the risks of prolonged use of prednisone including but not limited to weight gain, insomnia, osteoporosis, mood changes, diabetes, susceptibility to infection, glaucoma and high blood pressure.  In cases where prednisone use is prolonged, patients should be monitored with blood pressure checks, serum glucose levels and an eye exam.  Additionally, the patient may need to be placed on GI prophylaxis, PCP prophylaxis, and calcium and vitamin D supplementation and/or a bisphosphonate.  The patient verbalized understanding of the proper use and the possible adverse effects of prednisone.  All of the patient's questions and concerns were addressed.
Ketoconazole Counseling:   Patient counseled regarding improving absorption with orange juice.  Adverse effects include but are not limited to breast enlargement, headache, diarrhea, nausea, upset stomach, liver function test abnormalities, taste disturbance, and stomach pain.  There is a rare possibility of liver failure that can occur when taking ketoconazole. The patient understands that monitoring of LFTs may be required, especially at baseline. The patient verbalized understanding of the proper use and possible adverse effects of ketoconazole.  All of the patient's questions and concerns were addressed.
Simponi Counseling:  I discussed with the patient the risks of golimumab including but not limited to myelosuppression, immunosuppression, autoimmune hepatitis, demyelinating diseases, lymphoma, and serious infections.  The patient understands that monitoring is required including a PPD at baseline and must alert us or the primary physician if symptoms of infection or other concerning signs are noted.
Erythromycin Counseling:  I discussed with the patient the risks of erythromycin including but not limited to GI upset, allergic reaction, drug rash, diarrhea, increase in liver enzymes, and yeast infections.
Solaraze Pregnancy And Lactation Text: This medication is Pregnancy Category B and is considered safe. There is some data to suggest avoiding during the third trimester. It is unknown if this medication is excreted in breast milk.
Ketoconazole Pregnancy And Lactation Text: This medication is Pregnancy Category C and it isn't know if it is safe during pregnancy. It is also excreted in breast milk and breast feeding isn't recommended.
Elidel Counseling: Patient may experience a mild burning sensation during topical application. Elidel is not approved in children less than 2 years of age. There have been case reports of hematologic and skin malignancies in patients using topical calcineurin inhibitors although causality is questionable.
Odomzo Counseling- I discussed with the patient the risks of Odomzo including but not limited to nausea, vomiting, diarrhea, constipation, weight loss, changes in the sense of taste, decreased appetite, muscle spasms, and hair loss.  The patient verbalized understanding of the proper use and possible adverse effects of Odomzo.  All of the patient's questions and concerns were addressed.
Cimzia Pregnancy And Lactation Text: This medication crosses the placenta but can be considered safe in certain situations. Cimzia may be excreted in breast milk.
Detail Level: Detailed
Cimzia Counseling:  I discussed with the patient the risks of Cimzia including but not limited to immunosuppression, allergic reactions and infections.  The patient understands that monitoring is required including a PPD at baseline and must alert us or the primary physician if symptoms of infection or other concerning signs are noted.
Clofazimine Counseling:  I discussed with the patient the risks of clofazimine including but not limited to skin and eye pigmentation, liver damage, nausea/vomiting, gastrointestinal bleeding and allergy.
Erythromycin Pregnancy And Lactation Text: This medication is Pregnancy Category B and is considered safe during pregnancy. It is also excreted in breast milk.
Tremfya Counseling: I discussed with the patient the risks of guselkumab including but not limited to immunosuppression, serious infections, worsening of inflammatory bowel disease and drug reactions.  The patient understands that monitoring is required including a PPD at baseline and must alert us or the primary physician if symptoms of infection or other concerning signs are noted.
Terbinafine Counseling: Patient counseling regarding adverse effects of terbinafine including but not limited to headache, diarrhea, rash, upset stomach, liver function test abnormalities, itching, taste/smell disturbance, nausea, abdominal pain, and flatulence.  There is a rare possibility of liver failure that can occur when taking terbinafine.  The patient understands that a baseline LFT and kidney function test may be required. The patient verbalized understanding of the proper use and possible adverse effects of terbinafine.  All of the patient's questions and concerns were addressed.
Topical Retinoid counseling:  Patient advised to apply a pea-sized amount only at bedtime and wait 30 minutes after washing their face before applying.  If too drying, patient may add a non-comedogenic moisturizer. The patient verbalized understanding of the proper use and possible adverse effects of retinoids.  All of the patient's questions and concerns were addressed.
Cosentyx Counseling:  I discussed with the patient the risks of Cosentyx including but not limited to worsening of Crohn's disease, immunosuppression, allergic reactions and infections.  The patient understands that monitoring is required including a PPD at baseline and must alert us or the primary physician if symptoms of infection or other concerning signs are noted.
Metronidazole Counseling:  I discussed with the patient the risks of metronidazole including but not limited to seizures, nausea/vomiting, a metallic taste in the mouth, nausea/vomiting and severe allergy.
Eucrisa Counseling: Patient may experience a mild burning sensation during topical application. Eucrisa is not approved in children less than 2 years of age.
Acitretin Counseling:  I discussed with the patient the risks of acitretin including but not limited to hair loss, dry lips/skin/eyes, liver damage, hyperlipidemia, depression/suicidal ideation, photosensitivity.  Serious rare side effects can include but are not limited to pancreatitis, pseudotumor cerebri, bony changes, clot formation/stroke/heart attack.  Patient understands that alcohol is contraindicated since it can result in liver toxicity and significantly prolong the elimination of the drug by many years.
Otezla Counseling: The side effects of Otezla were discussed with the patient, including but not limited to worsening or new depression, weight loss, diarrhea, nausea, upper respiratory tract infection, and headache. Patient instructed to call the office should any adverse effect occur.  The patient verbalized understanding of the proper use and possible adverse effects of Otezla.  All the patient's questions and concerns were addressed.
Stelara Counseling:  I discussed with the patient the risks of ustekinumab including but not limited to immunosuppression, malignancy, posterior leukoencephalopathy syndrome, and serious infections.  The patient understands that monitoring is required including a PPD at baseline and must alert us or the primary physician if symptoms of infection or other concerning signs are noted.
Xeljanz Counseling: I discussed with the patient the risks of Xeljanz therapy including increased risk of infection, liver issues, headache, diarrhea, or cold symptoms. Live vaccines should be avoided. They were instructed to call if they have any problems.
Colchicine Counseling:  Patient counseled regarding adverse effects including but not limited to stomach upset (nausea, vomiting, stomach pain, or diarrhea).  Patient instructed to limit alcohol consumption while taking this medication.  Colchicine may reduce blood counts especially with prolonged use.  The patient understands that monitoring of kidney function and blood counts may be required, especially at baseline. The patient verbalized understanding of the proper use and possible adverse effects of colchicine.  All of the patient's questions and concerns were addressed.
Metronidazole Pregnancy And Lactation Text: This medication is Pregnancy Category B and considered safe during pregnancy.  It is also excreted in breast milk.
Terbinafine Pregnancy And Lactation Text: This medication is Pregnancy Category B and is considered safe during pregnancy. It is also excreted in breast milk and breast feeding isn't recommended.
Acitretin Pregnancy And Lactation Text: This medication is Pregnancy Category X and should not be given to women who are pregnant or may become pregnant in the future. This medication is excreted in breast milk.
Otezla Pregnancy And Lactation Text: This medication is Pregnancy Category C and it isn't known if it is safe during pregnancy. It is unknown if it is excreted in breast milk.
Oxybutynin Counseling:  I discussed with the patient the risks of oxybutynin including but not limited to skin rash, drowsiness, dry mouth, difficulty urinating, and blurred vision.
Hydroquinone Counseling:  Patient advised that medication may result in skin irritation, lightening (hypopigmentation), dryness, and burning.  In the event of skin irritation, the patient was advised to reduce the amount of the drug applied or use it less frequently.  Rarely, spots that are treated with hydroquinone can become darker (pseudoochronosis).  Should this occur, patient instructed to stop medication and call the office. The patient verbalized understanding of the proper use and possible adverse effects of hydroquinone.  All of the patient's questions and concerns were addressed.
Bexarotene Counseling:  I discussed with the patient the risks of bexarotene including but not limited to hair loss, dry lips/skin/eyes, liver abnormalities, hyperlipidemia, pancreatitis, depression/suicidal ideation, photosensitivity, drug rash/allergic reactions, hypothyroidism, anemia, leukopenia, infection, cataracts, and teratogenicity.  Patient understands that they will need regular blood tests to check lipid profile, liver function tests, white blood cell count, thyroid function tests and pregnancy test if applicable.
Tazorac Counseling:  Patient advised that medication is irritating and drying.  Patient may need to apply sparingly and wash off after an hour before eventually leaving it on overnight.  The patient verbalized understanding of the proper use and possible adverse effects of tazorac.  All of the patient's questions and concerns were addressed.
Xeljethroz Pregnancy And Lactation Text: This medication is Pregnancy Category D and is not considered safe during pregnancy.  The risk during breast feeding is also uncertain.
Taltz Counseling: I discussed with the patient the risks of ixekizumab including but not limited to immunosuppression, serious infections, worsening of inflammatory bowel disease and drug reactions.  The patient understands that monitoring is required including a PPD at baseline and must alert us or the primary physician if symptoms of infection or other concerning signs are noted.
Tazorac Pregnancy And Lactation Text: This medication is not safe during pregnancy. It is unknown if this medication is excreted in breast milk.
Minocycline Counseling: Patient advised regarding possible photosensitivity and discoloration of the teeth, skin, lips, tongue and gums.  Patient instructed to avoid sunlight, if possible.  When exposed to sunlight, patients should wear protective clothing, sunglasses, and sunscreen.  The patient was instructed to call the office immediately if the following severe adverse effects occur:  hearing changes, easy bruising/bleeding, severe headache, or vision changes.  The patient verbalized understanding of the proper use and possible adverse effects of minocycline.  All of the patient's questions and concerns were addressed.
Dupixent Counseling: I discussed with the patient the risks of dupilumab including but not limited to eye infection and irritation, cold sores, injection site reactions, worsening of asthma, allergic reactions and increased risk of parasitic infection.  Live vaccines should be avoided while taking dupilumab. Dupilumab will also interact with certain medications such as warfarin and cyclosporine. The patient understands that monitoring is required and they must alert us or the primary physician if symptoms of infection or other concerning signs are noted.
Quinolones Counseling:  I discussed with the patient the risks of fluoroquinolones including but not limited to GI upset, allergic reaction, drug rash, diarrhea, dizziness, photosensitivity, yeast infections, liver function test abnormalities, tendonitis/tendon rupture.
Bexarotene Pregnancy And Lactation Text: This medication is Pregnancy Category X and should not be given to women who are pregnant or may become pregnant. This medication should not be used if you are breast feeding.
Xolair Counseling:  Patient informed of potential adverse effects including but not limited to fever, muscle aches, rash and allergic reactions.  The patient verbalized understanding of the proper use and possible adverse effects of Xolair.  All of the patient's questions and concerns were addressed.
Dapsone Counseling: I discussed with the patient the risks of dapsone including but not limited to hemolytic anemia, agranulocytosis, rashes, methemoglobinemia, kidney failure, peripheral neuropathy, headaches, GI upset, and liver toxicity.  Patients who start dapsone require monitoring including baseline LFTs and weekly CBCs for the first month, then every month thereafter.  The patient verbalized understanding of the proper use and possible adverse effects of dapsone.  All of the patient's questions and concerns were addressed.
Cimetidine Counseling:  I discussed with the patient the risks of Cimetidine including but not limited to gynecomastia, headache, diarrhea, nausea, drowsiness, arrhythmias, pancreatitis, skin rashes, psychosis, bone marrow suppression and kidney toxicity.
Isotretinoin Counseling: Patient should get monthly blood tests, not donate blood, not drive at night if vision affected, not share medication, and not undergo elective surgery for 6 months after tx completed. Side effects reviewed, pt to contact office should one occur.
Dapsone Pregnancy And Lactation Text: This medication is Pregnancy Category C and is not considered safe during pregnancy or breast feeding.
Topical Clindamycin Counseling: Patient counseled that this medication may cause skin irritation or allergic reactions.  In the event of skin irritation, the patient was advised to reduce the amount of the drug applied or use it less frequently.   The patient verbalized understanding of the proper use and possible adverse effects of clindamycin.  All of the patient's questions and concerns were addressed.
Dupixent Pregnancy And Lactation Text: This medication likely crosses the placenta but the risk for the fetus is uncertain. This medication is excreted in breast milk.
Xolair Pregnancy And Lactation Text: This medication is Pregnancy Category B and is considered safe during pregnancy. This medication is excreted in breast milk.
Oxybutynin Pregnancy And Lactation Text: This medication is Pregnancy Category B and is considered safe during pregnancy. It is unknown if it is excreted in breast milk.
Birth Control Pills Counseling: Birth Control Pill Counseling: I discussed with the patient the potential side effects of OCPs including but not limited to increased risk of stroke, heart attack, thrombophlebitis, deep venous thrombosis, hepatic adenomas, breast changes, GI upset, headaches, and depression.  The patient verbalized understanding of the proper use and possible adverse effects of OCPs. All of the patient's questions and concerns were addressed.
Doxepin Counseling:  Patient advised that the medication is sedating and not to drive a car after taking this medication. Patient informed of potential adverse effects including but not limited to dry mouth, urinary retention, and blurry vision.  The patient verbalized understanding of the proper use and possible adverse effects of doxepin.  All of the patient's questions and concerns were addressed.
Imiquimod Counseling:  I discussed with the patient the risks of imiquimod including but not limited to erythema, scaling, itching, weeping, crusting, and pain.  Patient understands that the inflammatory response to imiquimod is variable from person to person and was educated regarded proper titration schedule.  If flu-like symptoms develop, patient knows to discontinue the medication and contact us.
Enbrel Counseling:  I discussed with the patient the risks of etanercept including but not limited to myelosuppression, immunosuppression, autoimmune hepatitis, demyelinating diseases, lymphoma, and infections.  The patient understands that monitoring is required including a PPD at baseline and must alert us or the primary physician if symptoms of infection or other concerning signs are noted.
Erivedge Counseling- I discussed with the patient the risks of Erivedge including but not limited to nausea, vomiting, diarrhea, constipation, weight loss, changes in the sense of taste, decreased appetite, muscle spasms, and hair loss.  The patient verbalized understanding of the proper use and possible adverse effects of Erivedge.  All of the patient's questions and concerns were addressed.
Isotretinoin Pregnancy And Lactation Text: This medication is Pregnancy Category X and is considered extremely dangerous during pregnancy. It is unknown if it is excreted in breast milk.

## 2019-06-18 NOTE — HPI: RASH
How Severe Is Your Rash?: moderate
Is This A New Presentation, Or A Follow-Up?: Rash
Additional History: Pt states that

## 2019-06-18 NOTE — PROCEDURE: OTHER
Other (Free Text): Follow up in 2-3 weeks. May bx on follow up if not improved
Detail Level: Zone
Note Text (......Xxx Chief Complaint.): This diagnosis correlates with the

## 2019-07-02 ENCOUNTER — APPOINTMENT (RX ONLY)
Dept: URBAN - METROPOLITAN AREA CLINIC 51 | Facility: CLINIC | Age: 64
Setting detail: DERMATOLOGY
End: 2019-07-02

## 2019-07-02 DIAGNOSIS — L73.9 FOLLICULAR DISORDER, UNSPECIFIED: ICD-10-CM

## 2019-07-02 DIAGNOSIS — L663 OTHER SPECIFIED DISEASES OF HAIR AND HAIR FOLLICLES: ICD-10-CM

## 2019-07-02 DIAGNOSIS — L738 OTHER SPECIFIED DISEASES OF HAIR AND HAIR FOLLICLES: ICD-10-CM

## 2019-07-02 PROBLEM — L30.9 DERMATITIS, UNSPECIFIED: Status: ACTIVE | Noted: 2019-07-02

## 2019-07-02 PROCEDURE — 11105 PUNCH BX SKIN EA SEP/ADDL: CPT

## 2019-07-02 PROCEDURE — 11104 PUNCH BX SKIN SINGLE LESION: CPT

## 2019-07-02 PROCEDURE — ? BIOPSY BY PUNCH METHOD

## 2019-07-02 ASSESSMENT — LOCATION ZONE DERM: LOCATION ZONE: SCALP

## 2019-07-02 ASSESSMENT — SEVERITY ASSESSMENT: SEVERITY: MILD

## 2019-07-02 ASSESSMENT — PAIN INTENSITY VAS: HOW INTENSE IS YOUR PAIN 0 BEING NO PAIN, 10 BEING THE MOST SEVERE PAIN POSSIBLE?: 5/10 PAIN

## 2019-07-02 ASSESSMENT — LOCATION DETAILED DESCRIPTION DERM
LOCATION DETAILED: POSTERIOR MID-PARIETAL SCALP
LOCATION DETAILED: RIGHT SUPERIOR PARIETAL SCALP

## 2019-07-02 ASSESSMENT — LOCATION SIMPLE DESCRIPTION DERM
LOCATION SIMPLE: POSTERIOR SCALP
LOCATION SIMPLE: SCALP

## 2019-07-02 NOTE — PROCEDURE: BIOPSY BY PUNCH METHOD
Render Path Notes In Note?: No
Was A Bandage Applied: Yes
Biopsy Type: H and E
Hemostasis: Pressure
Lab: [id number]
Dressing: bandage
Post-Care Instructions: I reviewed with the patient in detail post-care instructions. Patient is to keep the biopsy site dry overnight, and then apply vaseline twice daily until healed. Patient may apply hydrogen peroxide soaks to remove any crusting.
Number Of Epidermal Sutures (Optional): 1
Lab Facility: [id number]
Home Suture Removal Text: Patient was provided a home suture removal kit and will remove their sutures at home.  If they have any questions or difficulties they will call the office.
Size Of Lesion In Cm (Optional): 0
Anesthesia Type: 1% lidocaine with epinephrine
Suture Removal: 8 days
Consent: verbal consent was obtained and risks were reviewed including but not limited to scarring, infection, bleeding, scabbing, incomplete removal, nerve damage and allergy to anesthesia.
Detail Level: Detailed
Notification Instructions: Patient will be notified of biopsy results. However, patient instructed to call the office if not contacted within 2 weeks.
Punch Size In Mm: 3
Wound Care: Aquaphor
Billing Type: Third-Party Bill
Anesthesia Volume In Cc (Will Not Render If 0): 2
Epidermal Sutures: 4-0 Nylon
Lab: [id number]
Biopsy Type: DIF
Lab Facility: [id number]
Home Suture Removal Text: Patient was provided a home suture removal kit and will remove their sutures at home.  If they have any questions or difficulties they will call the office.
Billing Type: Third-Party Bill

## 2019-07-17 ENCOUNTER — RX ONLY (OUTPATIENT)
Age: 64
Setting detail: RX ONLY
End: 2019-07-17

## 2019-07-17 RX ORDER — FLUOCINOLONE ACETONIDE 0.11 MG/ML
OIL TOPICAL
Qty: 118 | Refills: 2 | Status: ERX | COMMUNITY
Start: 2019-07-17

## 2019-07-17 RX ORDER — CYPROHEPTADINE HYDROCHLORIDE 4 MG/1
TABLET ORAL
Qty: 60 | Refills: 2 | Status: ERX | COMMUNITY
Start: 2019-07-17

## 2020-02-24 ENCOUNTER — HOSPITAL ENCOUNTER (OUTPATIENT)
Dept: HOSPITAL 75 - RAD | Age: 65
End: 2020-02-24
Attending: NURSE PRACTITIONER
Payer: MEDICARE

## 2020-02-24 DIAGNOSIS — M25.552: ICD-10-CM
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
## 2020-02-24 DIAGNOSIS — M79.89: Primary | ICD-10-CM

## 2020-02-24 DIAGNOSIS — M25.572: ICD-10-CM

## 2020-02-24 DIAGNOSIS — R10.2: ICD-10-CM

## 2020-02-24 PROCEDURE — 73610 X-RAY EXAM OF ANKLE: CPT

## 2020-02-24 NOTE — DIAGNOSTIC IMAGING REPORT
INDICATION: Fall.



Time of exam 2:17 PM



AP view of the pelvis and 2 views of the left hip were obtained.

Femoral acetabular alignment is normal. Femoral head and neck are

intact. No fractures are seen. Rami appear to be intact. SI

joints and symphysis are not widened. Linear metallic opacities

overlie the left SI joint.



IMPRESSION: No acute bony abnormalities detected. 



Report of pelvis/left hip as well as left ankle were called to

Anjali Mancera nurse practitioner by oscar at 2:47 p.m.



Dictated by: 



  Dictated on workstation # [id number]

## 2020-02-24 NOTE — DIAGNOSTIC IMAGING REPORT
INDICATION: Fall with lateral ankle pain and swelling.



TIME OF EXAM: 02:13 p.m.



FINDINGS: Three views of the left ankle were obtained.



Alignment is normal. Ankle mortise is well maintained. Talar dome

is smooth. No fracture or dislocation is seen. There is lateral

soft tissue swelling.



IMPRESSION: Lateral soft tissue swelling. No acute bony

abnormality is detected.



Dictated by: 



  Dictated on workstation # [id number]

## 2020-07-09 ENCOUNTER — HOSPITAL ENCOUNTER (OUTPATIENT)
Dept: HOSPITAL 75 - LABNPT | Age: 65
End: 2020-07-09
Attending: FAMILY MEDICINE
Payer: MEDICARE

## 2020-07-09 DIAGNOSIS — R50.9: Primary | ICD-10-CM

## 2020-07-09 DIAGNOSIS — R07.89: ICD-10-CM

## 2020-07-09 DIAGNOSIS — Z20.828: ICD-10-CM

## 2020-07-09 DIAGNOSIS — M79.10: ICD-10-CM

## 2020-07-09 PROCEDURE — 87635 SARS-COV-2 COVID-19 AMP PRB: CPT

## 2020-08-07 ENCOUNTER — HOSPITAL ENCOUNTER (OUTPATIENT)
Dept: HOSPITAL 75 - RAD | Age: 65
End: 2020-08-07
Attending: FAMILY MEDICINE
Payer: MEDICARE

## 2020-08-07 DIAGNOSIS — R91.8: ICD-10-CM

## 2020-08-07 DIAGNOSIS — B45.0: Primary | ICD-10-CM

## 2020-08-07 PROCEDURE — 71260 CT THORAX DX C+: CPT

## 2020-08-07 NOTE — DIAGNOSTIC IMAGING REPORT
PROCEDURE: CT chest with contrast only.



TECHNIQUE: Multiple contiguous axial images were obtained through

the chest after administration of intravenous contrast. Auto

Exposure Controls were utilized during the CT exam to meet ALARA

standards for radiation dose reduction. 



INDICATION: No significant adenopathy within the chest. No

aneurysmal dilatation of the thoracic aorta. Mild scattered

vascular calcifications. The heart is within normal limits in

size. No pericardial effusion. No pleural effusion. No

pneumothorax. The left lung is clear. A 1.8 x 1.5 cm round

pulmonary nodule is noted within the right lower lobe. This is

new since April 2016. This was within a region of consolidation

on prior CT from 2018. Therefore, exact chronicity of this

pulmonary nodule is uncertain. Additional ovoid 1.0 x 0.8 cm

pleural-based right middle lobe pulmonary nodule is present. This

is in a region of consolidation noted on the prior exam from

2018. Mild scarring and atelectasis within the bilateral lung

bases. Diffusely decreased density of the liver. Postsurgical

changes noted involving the proximal stomach. Cholecystectomy.

Postsurgical changes within the cervicothoracic spine. Stable

sclerotic lesion within the right aspect of T12, felt to relate

to a bone island. Scattered osseous degenerative changes without

acute osseous abnormality.



IMPRESSION: 

Two separate right-sided pulmonary nodules, as described above,

including a 1.8 x 1.5 cm solid pulmonary nodule. These pulmonary

nodules are within regions of dense consolidation on prior CT

from 2018, and would have been obscured at that time. Etiology of

this pulmonary nodule is uncertain. This may be on an infectious

or inflammatory process. However, malignancy needs to be

excluded. PET/CT is recommended for further evaluation.



Fatty infiltration of the liver.



Additional findings as above.



Dictated by: 



  Dictated on workstation # [id number]

## 2020-08-28 ENCOUNTER — HOSPITAL ENCOUNTER (OUTPATIENT)
Dept: HOSPITAL 75 - CARD | Age: 65
End: 2020-08-28
Attending: FAMILY MEDICINE
Payer: MEDICARE

## 2020-08-28 DIAGNOSIS — R01.1: Primary | ICD-10-CM

## 2020-08-28 PROCEDURE — 93306 TTE W/DOPPLER COMPLETE: CPT

## 2021-01-29 ENCOUNTER — HOSPITAL ENCOUNTER (OUTPATIENT)
Dept: HOSPITAL 75 - INFUSION | Age: 66
End: 2021-01-29
Attending: FAMILY MEDICINE
Payer: MEDICARE

## 2021-01-29 VITALS — DIASTOLIC BLOOD PRESSURE: 81 MMHG | SYSTOLIC BLOOD PRESSURE: 187 MMHG

## 2021-01-29 VITALS — DIASTOLIC BLOOD PRESSURE: 82 MMHG | SYSTOLIC BLOOD PRESSURE: 179 MMHG

## 2021-01-29 DIAGNOSIS — U07.1: Primary | ICD-10-CM

## 2021-03-22 ENCOUNTER — HOSPITAL ENCOUNTER (OUTPATIENT)
Dept: HOSPITAL 75 - CARD | Age: 66
End: 2021-03-22
Attending: INTERNAL MEDICINE
Payer: MEDICARE

## 2021-03-22 DIAGNOSIS — R07.89: Primary | ICD-10-CM

## 2021-03-22 PROCEDURE — 93306 TTE W/DOPPLER COMPLETE: CPT

## 2021-03-23 ENCOUNTER — HOSPITAL ENCOUNTER (OUTPATIENT)
Dept: HOSPITAL 75 - CARD | Age: 66
End: 2021-03-23
Attending: INTERNAL MEDICINE
Payer: MEDICARE

## 2021-03-23 VITALS — SYSTOLIC BLOOD PRESSURE: 153 MMHG | DIASTOLIC BLOOD PRESSURE: 72 MMHG

## 2021-03-23 VITALS — BODY MASS INDEX: 30.12 KG/M2 | WEIGHT: 185.19 LBS | HEIGHT: 65.75 IN

## 2021-03-23 DIAGNOSIS — R07.89: Primary | ICD-10-CM

## 2021-03-23 PROCEDURE — 78452 HT MUSCLE IMAGE SPECT MULT: CPT

## 2021-03-23 PROCEDURE — 93017 CV STRESS TEST TRACING ONLY: CPT

## 2021-03-23 RX ADMIN — Medication PRN ML: at 09:17

## 2021-03-23 RX ADMIN — Medication PRN ML: at 07:31

## 2021-03-25 ENCOUNTER — HOSPITAL ENCOUNTER (OUTPATIENT)
Dept: HOSPITAL 75 - RAD | Age: 66
End: 2021-03-25
Attending: FAMILY MEDICINE
Payer: MEDICARE

## 2021-03-25 DIAGNOSIS — M25.551: Primary | ICD-10-CM

## 2021-03-25 PROCEDURE — 73502 X-RAY EXAM HIP UNI 2-3 VIEWS: CPT

## 2021-03-25 NOTE — DIAGNOSTIC IMAGING REPORT
INDICATION: Right hip pain 



COMPARISON: None.



FINDINGS: 2 views of the right were obtained and show no

fractures, dislocations, or other acute bony abnormalities. Joint

spaces are well maintained throughout. The soft tissues appear

unremarkable. No radiopaque foreign bodies are identified.



IMPRESSION: Unremarkable radiographic exam of the right.



Dictated by: 



  Dictated on workstation # [id number]

## 2021-04-12 ENCOUNTER — HOSPITAL ENCOUNTER (OUTPATIENT)
Dept: HOSPITAL 75 - RAD | Age: 66
End: 2021-04-12
Attending: FAMILY MEDICINE
Payer: COMMERCIAL

## 2021-04-12 DIAGNOSIS — R91.8: ICD-10-CM

## 2021-04-12 DIAGNOSIS — B45.0: Primary | ICD-10-CM

## 2021-04-12 LAB
BUN/CREAT SERPL: 21
CREAT SERPL-MCNC: 1.03 MG/DL (ref 0.6–1.3)
GFR SERPLBLD BASED ON 1.73 SQ M-ARVRAT: 54 ML/MIN

## 2021-04-12 PROCEDURE — 84520 ASSAY OF UREA NITROGEN: CPT

## 2021-04-12 PROCEDURE — 71260 CT THORAX DX C+: CPT

## 2021-04-12 PROCEDURE — 82565 ASSAY OF CREATININE: CPT

## 2021-04-12 PROCEDURE — 36415 COLL VENOUS BLD VENIPUNCTURE: CPT

## 2021-04-12 NOTE — DIAGNOSTIC IMAGING REPORT
PROCEDURE: CT chest with contrast only.



TECHNIQUE: Multiple contiguous axial images were obtained through

the chest after administration of intravenous contrast. Auto

Exposure Controls were utilized during the CT exam to meet ALARA

standards for radiation dose reduction. 



INDICATION: Dyspnea and pulmonary nodule



COMPARISON: 08/07/2020



Dominant nodule in the superior segment of the right lower lobe

now measures 1.5 x 1.2 cm compared with 1.8 x 1.5 cm on the

previous study. A second nodule along the major fissure just

below the aleks in the right lung measures 1.0 x 0.7 cm compared

with 1.0 x 0.8 cm on the previous study. There is mild

surrounding atelectasis and/or scarring as well. No new dominant

mass or infiltrate is identified in either lung. There is no

significant pleural or pericardial fluid. There is mild hiatal

hernia. Left vertebral artery arises directly from the aortic

arch. Left lobe of the thyroid gland is small but unchanged.

There is hepatic steatosis. Surgical findings are seen in the

left hepatogastric ligament.



IMPRESSION: Stable to mildly improved right lung nodules without

evidence of new abnormality identified in the thorax.



Dictated by: 



  Dictated on workstation # [id number]

## 2021-05-27 ENCOUNTER — HOSPITAL ENCOUNTER (OUTPATIENT)
Dept: HOSPITAL 75 - RAD | Age: 66
End: 2021-05-27
Attending: FAMILY MEDICINE
Payer: MEDICARE

## 2021-05-27 DIAGNOSIS — K76.0: Primary | ICD-10-CM

## 2021-05-27 DIAGNOSIS — K52.9: ICD-10-CM

## 2021-05-27 PROCEDURE — 74176 CT ABD & PELVIS W/O CONTRAST: CPT

## 2021-05-27 NOTE — DIAGNOSTIC IMAGING REPORT
PROCEDURE: CT abdomen and pelvis without contrast.



TECHNIQUE: Multiple contiguous axial images were obtained through

the abdomen and pelvis without the use of intravenous contrast.

Auto Exposure Controls were utilized during the CT exam to meet

ALARA standards for radiation dose reduction. 



INDICATION:  Pelvic pain and abdominal pain.



Comparison is made with prior CT from 10/14/2016.



FINDINGS: The lung bases are clear. Liver again demonstrates

diffuse low density consistent with hepatic steatosis. No

discrete liver mass is detected. Gallbladder surgically absent.

No biliary duct dilatation is seen. Pancreas and spleen are

unremarkable. No adrenal mass is detected. Multiple surgical

clips in the region of the distal esophagus and stomach are

noted. No definite renal calculi or hydronephrosis is identified.

Aorta is nonaneurysmal. The small and large bowel loops are

normal caliber. There is moderate stool in the colon suggesting

constipation. No bowel obstruction is seen. There does appear to

be some very mild inflammatory stranding adjacent to the sigmoid

colon in the lower left pelvis. This may indicate some mild

diverticulitis. There is no free fluid or fluid collection.

Uterus is surgically absent. Bladder is unremarkable. No definite

abdominal or pelvic lymphadenopathy is identified. Postsurgical

changes with orthopedic hardware transfixing the left SI joint is

noted.



IMPRESSION:

1. Hepatic steatosis.

2. Postsurgical changes, as described.

3. Moderate stool suggests constipation.

4. Mild inflammatory stranding adjacent to the sigmoid colon,

which may indicate some mild diverticulitis. No abscess formation

or bowel obstruction is identified.



Dictated by: 



  Dictated on workstation # [id number]

## 2021-06-24 ENCOUNTER — HOSPITAL ENCOUNTER (INPATIENT)
Dept: HOSPITAL 75 - 4TH | Age: 66
LOS: 4 days | Discharge: HOME | DRG: 392 | End: 2021-06-28
Attending: FAMILY MEDICINE | Admitting: FAMILY MEDICINE
Payer: MEDICARE

## 2021-06-24 VITALS — DIASTOLIC BLOOD PRESSURE: 66 MMHG | SYSTOLIC BLOOD PRESSURE: 115 MMHG

## 2021-06-24 VITALS — SYSTOLIC BLOOD PRESSURE: 138 MMHG | DIASTOLIC BLOOD PRESSURE: 65 MMHG

## 2021-06-24 VITALS — HEIGHT: 66.14 IN | WEIGHT: 185.19 LBS | BODY MASS INDEX: 29.76 KG/M2

## 2021-06-24 VITALS — SYSTOLIC BLOOD PRESSURE: 122 MMHG | DIASTOLIC BLOOD PRESSURE: 68 MMHG

## 2021-06-24 DIAGNOSIS — Z88.2: ICD-10-CM

## 2021-06-24 DIAGNOSIS — M19.91: ICD-10-CM

## 2021-06-24 DIAGNOSIS — Z82.49: ICD-10-CM

## 2021-06-24 DIAGNOSIS — Z80.0: ICD-10-CM

## 2021-06-24 DIAGNOSIS — Z88.0: ICD-10-CM

## 2021-06-24 DIAGNOSIS — M45.6: ICD-10-CM

## 2021-06-24 DIAGNOSIS — I25.2: ICD-10-CM

## 2021-06-24 DIAGNOSIS — I34.1: ICD-10-CM

## 2021-06-24 DIAGNOSIS — Z79.84: ICD-10-CM

## 2021-06-24 DIAGNOSIS — K57.32: Primary | ICD-10-CM

## 2021-06-24 DIAGNOSIS — Z79.2: ICD-10-CM

## 2021-06-24 DIAGNOSIS — J45.909: ICD-10-CM

## 2021-06-24 DIAGNOSIS — K50.90: ICD-10-CM

## 2021-06-24 DIAGNOSIS — Z88.1: ICD-10-CM

## 2021-06-24 DIAGNOSIS — E03.9: ICD-10-CM

## 2021-06-24 DIAGNOSIS — I25.10: ICD-10-CM

## 2021-06-24 DIAGNOSIS — M32.9: ICD-10-CM

## 2021-06-24 DIAGNOSIS — K21.9: ICD-10-CM

## 2021-06-24 DIAGNOSIS — E11.9: ICD-10-CM

## 2021-06-24 DIAGNOSIS — Z83.3: ICD-10-CM

## 2021-06-24 DIAGNOSIS — Z86.010: ICD-10-CM

## 2021-06-24 LAB
ALBUMIN SERPL-MCNC: 4.1 GM/DL (ref 3.2–4.5)
ALP SERPL-CCNC: 50 U/L (ref 40–136)
ALT SERPL-CCNC: 21 U/L (ref 0–55)
AMYLASE SERPL-CCNC: 49 U/L (ref 25–125)
BASOPHILS # BLD AUTO: 0 10^3/UL (ref 0–0.1)
BASOPHILS NFR BLD AUTO: 0 % (ref 0–10)
BILIRUB SERPL-MCNC: 0.3 MG/DL (ref 0.1–1)
BUN/CREAT SERPL: 20
CALCIUM SERPL-MCNC: 9.2 MG/DL (ref 8.5–10.1)
CHLORIDE SERPL-SCNC: 103 MMOL/L (ref 98–107)
CO2 SERPL-SCNC: 22 MMOL/L (ref 21–32)
CREAT SERPL-MCNC: 0.82 MG/DL (ref 0.6–1.3)
EOSINOPHIL # BLD AUTO: 0 10^3/UL (ref 0–0.3)
EOSINOPHIL NFR BLD AUTO: 1 % (ref 0–10)
ERYTHROCYTE [SEDIMENTATION RATE] IN BLOOD: 15 MM/HR (ref 0–30)
GFR SERPLBLD BASED ON 1.73 SQ M-ARVRAT: > 60 ML/MIN
GLUCOSE SERPL-MCNC: 94 MG/DL (ref 70–105)
HCT VFR BLD CALC: 44 % (ref 35–52)
HGB BLD-MCNC: 14.7 G/DL (ref 11.5–16)
LIPASE SERPL-CCNC: 62 U/L (ref 8–78)
LYMPHOCYTES # BLD AUTO: 2.1 10^3/UL (ref 1–4)
LYMPHOCYTES NFR BLD AUTO: 30 % (ref 12–44)
MANUAL DIFFERENTIAL PERFORMED BLD QL: NO
MCH RBC QN AUTO: 31 PG (ref 25–34)
MCHC RBC AUTO-ENTMCNC: 34 G/DL (ref 32–36)
MCV RBC AUTO: 94 FL (ref 80–99)
MONOCYTES # BLD AUTO: 0.6 10^3/UL (ref 0–1)
MONOCYTES NFR BLD AUTO: 8 % (ref 0–12)
NEUTROPHILS # BLD AUTO: 4.3 10^3/UL (ref 1.8–7.8)
NEUTROPHILS NFR BLD AUTO: 61 % (ref 42–75)
PLATELET # BLD: 262 10^3/UL (ref 130–400)
PMV BLD AUTO: 9.3 FL (ref 9–12.2)
POTASSIUM SERPL-SCNC: 4.3 MMOL/L (ref 3.6–5)
PROT SERPL-MCNC: 8.2 GM/DL (ref 6.4–8.2)
SODIUM SERPL-SCNC: 134 MMOL/L (ref 135–145)
WBC # BLD AUTO: 7.1 10^3/UL (ref 4.3–11)

## 2021-06-24 PROCEDURE — 74176 CT ABD & PELVIS W/O CONTRAST: CPT

## 2021-06-24 PROCEDURE — 82150 ASSAY OF AMYLASE: CPT

## 2021-06-24 PROCEDURE — 82947 ASSAY GLUCOSE BLOOD QUANT: CPT

## 2021-06-24 PROCEDURE — 80053 COMPREHEN METABOLIC PANEL: CPT

## 2021-06-24 PROCEDURE — 85025 COMPLETE CBC W/AUTO DIFF WBC: CPT

## 2021-06-24 PROCEDURE — 85652 RBC SED RATE AUTOMATED: CPT

## 2021-06-24 PROCEDURE — 83690 ASSAY OF LIPASE: CPT

## 2021-06-24 PROCEDURE — 85027 COMPLETE CBC AUTOMATED: CPT

## 2021-06-24 PROCEDURE — 36415 COLL VENOUS BLD VENIPUNCTURE: CPT

## 2021-06-24 RX ADMIN — ONDANSETRON PRN MG: 2 INJECTION, SOLUTION INTRAMUSCULAR; INTRAVENOUS at 13:24

## 2021-06-24 RX ADMIN — METRONIDAZOLE SCH MLS/HR: 5 INJECTION, SOLUTION INTRAVENOUS at 21:10

## 2021-06-24 RX ADMIN — METRONIDAZOLE SCH MLS/HR: 5 INJECTION, SOLUTION INTRAVENOUS at 13:34

## 2021-06-24 RX ADMIN — FENTANYL CITRATE PRN MCG: 50 INJECTION, SOLUTION INTRAMUSCULAR; INTRAVENOUS at 16:47

## 2021-06-24 RX ADMIN — MORPHINE SULFATE PRN MG: 4 INJECTION, SOLUTION INTRAMUSCULAR; INTRAVENOUS at 21:36

## 2021-06-24 RX ADMIN — INSULIN ASPART SCH UNIT: 100 INJECTION, SOLUTION INTRAVENOUS; SUBCUTANEOUS at 21:04

## 2021-06-24 RX ADMIN — PANTOPRAZOLE SODIUM SCH MG: 40 INJECTION, POWDER, FOR SOLUTION INTRAVENOUS at 21:10

## 2021-06-24 RX ADMIN — SODIUM CHLORIDE SCH MLS/HR: 900 INJECTION INTRAVENOUS at 13:32

## 2021-06-24 RX ADMIN — FENTANYL CITRATE PRN MCG: 50 INJECTION, SOLUTION INTRAMUSCULAR; INTRAVENOUS at 13:25

## 2021-06-24 RX ADMIN — ENOXAPARIN SODIUM SCH MG: 100 INJECTION SUBCUTANEOUS at 13:30

## 2021-06-24 RX ADMIN — MORPHINE SULFATE PRN MG: 4 INJECTION, SOLUTION INTRAMUSCULAR; INTRAVENOUS at 19:28

## 2021-06-24 RX ADMIN — ONDANSETRON PRN MG: 2 INJECTION, SOLUTION INTRAMUSCULAR; INTRAVENOUS at 19:30

## 2021-06-24 RX ADMIN — DEXTROSE MONOHYDRATE AND SODIUM CHLORIDE SCH MLS/HR: 5; .45 INJECTION, SOLUTION INTRAVENOUS at 19:28

## 2021-06-24 RX ADMIN — INSULIN ASPART SCH UNIT: 100 INJECTION, SOLUTION INTRAVENOUS; SUBCUTANEOUS at 16:42

## 2021-06-24 NOTE — CONSULTATION - SURGERY
History of Present Illness


History of Present Illness


Patient Consulted On(carissa/time)


6/24/21


 15:36


Date Seen by Provider:  Jun 24, 2021


Time Seen by Provider:  15:36


History of Present Illness


Consult requested by Dr. Slater for diverticulitis 





Patient is a 66-year-old female who couple weeks ago was treated for acute 

diverticulitis.  Patient states she was feeling better.  And then the last 

couple days she has began having increasing abdominal cramping.  Pain is in the 

left lower quadrant also having some pain in the right upper quadrant.  No 

radiation of the pains.  Patient states that nothing only seems to make it 

better or worse.  She states the pain can be severe at times doubling her over. 

She states she still passing gas but having some diarrhea.  Patient is not havin

g any nausea or vomiting.  She is not having any fever sweats chills shortness 

of breath or chest pain.  Patient states her last colonoscopy was approximately 

5 years ago she believes.  Patient has CT scan pending.





Allergies and Home Medications


Allergies


Coded Allergies:  


     Penicillins (Verified  Allergy, Unknown, 4/5/14)


     clarithromycin (Verified  Allergy, Unknown, 6/27/18)


   Patient as tolerated azithromycin


     gluten (Verified  Allergy, Unknown, 4/7/14)


     metoclopramide (Unverified  Allergy, Unknown, 3/30/16)


     piroxicam (Verified  Allergy, Unknown, Pt has received Ketorolac w/o issue,

6/26/18)


     sulfacetamide sodium (Verified  Allergy, Unknown, 4/5/14)





Home Medications


Bisoprolol Fumarate 10 Mg Tablet, 10 MG PO BID, (Reported)


   Last Action: Reviewed


Cholecalciferol (Vitamin D3) 125 Mcg Capsule, 125 MCG PO HS, (Reported)


   Last Action: Reviewed


Cyanocobalamin 1,000 Mcg/Ml Inj, 1,000 MCG IM EVERY 2 MONTHS, (Reported)


   Last Action: Reviewed


Docusate Sodium 100 Mg Tablet, 100 MG PO DAILY, (Reported)


   Last Action: Reviewed


Duloxetine HCl 60 Mg Capsule.dr, 60 MG PO HS, (Reported)


   Last Action: Reviewed


Empagliflozin/Linagliptin 1 Each Tablet, 1 EA PO DAILY, (Reported)


   Last Action: Reviewed


Ergocalciferol (Vitamin D2) 1,250 Mcg Capsule, 1,250 MCG PO SAT, (Reported)


   Last Action: Reviewed


Estradiol 1 Each Patch.tdsw, 1 PATCH TD TWICE WEEKLY, (Reported)


   NEXT PATCH IS DUE MONDAY 06- 


   Last Action: Reviewed


Fexofenadine HCl 180 Mg Tablet, 180 MG PO DAILY, (Reported)


   Last Action: Reviewed


Glipizide 5 Mg Tablet, 5 MG PO DAILY, (Reported)


   Last Action: Reviewed


Hydrocodone/Acetaminophen 1 Each Tablet, 1-2 TAB PO Q8H PRN for PAIN-MODERATE 

(5-7), (Reported)


   Last Action: Reviewed


Ibuprofen 200 Mg Capsule, 400-600 MG PO Q8H PRN for PAIN-MILD (1-4), (Reported)


   Last Action: Reviewed


Levothyroxine Sodium 88 Mcg Capsule, 88 MCG PO DAILY, (Reported)


   Last Action: Reviewed


Liothyronine Sodium 5 Mcg Tablet, 5 MCG PO DAILY, (Reported)


   Last Action: Reviewed


Pantoprazole Sodium 40 Mg Tablet.dr, 40 MG PO HS, (Reported)


   Last Action: Reviewed


Polyethylene Glycol 3350 17 Gm Powd.pack, 17 GM PO BID, (Reported)


   Last Action: Reviewed


Sennosides/Docusate Sodium 1 Each Tablet, 2 EACH PO BID, (Reported)


   Last Action: Reviewed


Valsartan 320 Mg Tablet, 320 MG PO DAILY, (Reported)


   Last Action: Reviewed





Patient Home Medication List


Home Medication List Reviewed:  Yes





Past Medical-Social-Family Hx


Patient Social History


Smoking Status:  Never a Smoker


Recent Hopitalizations:  No


Alcohol Use?:  No


Have you traveled recently?:  Yes





Immunizations Up To Date


Tetanus Booster (TDap):  Less than 5yrs


PED Vaccines UTD:  Yes


Date of Pneumonia Vaccine:  Sep 6, 2012


Date of Influenza Vaccine:  Nov 10, 2016





Seasonal Allergies


Seasonal Allergies:  No





Surgeries


History of Surgeries:  Yes


Surgeries:  Appendectomy, Breast, Cardiac, Gallbladder, Hysterectomy, 

Oophorectomy, Orthopedic, Thyroidectomy, Tonsillectomy





Respiratory


History of Respiratory Disorde:  Yes


Respiratory Disorders:  Asthma





Cardiovascular


History of Cardiac Disorders:  Yes (mitral valve prolapse, heart caths no 

stents)


Cardiac Disorders:  Coronary Artery Disease, Heart Attack, Valvular Heart 

Disease





Neurological


History of Neurological Disord:  Yes





Reproductive System


Hx Reproductive Disorders:  Yes (TOTAL HYSTERECTOMY)


Sexually Transmitted Disease:  No


HIV/AIDS:  No


Female Reproductive Disorders:  Menstrual Problems, Endometriosis, Ovarian Cyst


GYN History:  Hysterectomy





Genitourinary


Genitourinary Disorders:  Kidney Infection, Kidney Stones, UTI-Chronic





Gastrointestinal


History of Gastrointestinal Di:  Yes (CHRONIC ABDOMINAL PAIN )


Gastrointestinal Disorders:  Gastroesophageal Reflux, Crohns Disease, 

Diverticulosis, Polyps, Hiatal Hernia, Ulcer, Gall Bladder Disease, Irritable 

Bowel





Musculoskeletal


History of Musculoskeletal Dis:  Yes (ANKLYLOSING SPONDYLITIS)


Musculoskeletal Disorders:  Degenerate Disk Disease, Arthritis, Chronic Back 

Pain





Endocrine


History of Endocrine Disorders:  Yes


Endocrine Disorders:  Hypothyroidsim, Lupus





HEENT


Loss of Vision:  Denies


Hearing Impairment:  Denies





Cancer


History of Cancer:  No





Psychosocial


History of Psychiatric Problem:  No





Integumentary


History of Skin or Integumenta:  Yes (STAPH INFECTIONS)





Blood Transfusions


History of Blood Disorders:  No


Adverse Reaction to a Blood Tr:  No





Reviewed Nursing Assessment


Reviewed/Agree w Nursing PMH:  Yes





Family Medical History


Significant Family History:  No Pertinent Family Hx


Family Medial History:  


Family history: Cardiovascular disease


Family history: Diabetes mellitus


  03 MOTHER


  09 BROTHER


Family history: Glaucoma


  03 MOTHER


Family history: Thyroid disorder


  03 MOTHER


Heart disease


  03 FATHER (AORTIC VALVE REPLACED)





Review of Systems-General


Constitutional:  No chills, No diaphoresis, No weakness


EENTM:  No blurred vision, No double vision


Respiratory:  No cough, No dyspnea on exertion


Cardiovascular:  No chest pain, No edema


Gastrointestinal:  RUQ, LLQ, abdominal pain; No nausea, No vomiting


Genitourinary:  No decreased output, No discharge


Musculoskeletal:  No back pain, No joint pain


Skin:  No change in color, No change in hair/nails


Psychiatric/Neurological:  Denies Anxiety, Denies Depressed, Denies Emotional 

Problems


All Other Systems Reviewed


Negative Unless Noted:  Yes (Negative excepted noted.)





Physical Exam-General Problems


Physical Exam


Vital Signs





Vital Signs - First Documented








 6/24/21





 13:20


 


Temp 35.6


 


Pulse 65


 


Resp 18


 


B/P (MAP) 138/65 (89)


 


Pulse Ox 94


 


O2 Delivery Room Air





Capillary Refill :


General Appearance:  WD/WN, no apparent distress


HEENT:  PERRL/EOMI, normal ENT inspection


Neck:  non-tender, supple


Respiratory:  chest non-tender, no respiratory distress, no accessory muscle use


Cardiovascular:  regular rate, rhythm, no JVD


Gastrointestinal:  soft, tenderness (Tenderness in right upper quadrant and left

lower quadrant with light pressure)


Rectal:  deferred


Back:  no CVA tenderness, no vertebral tenderness


Extremities:  non-tender, normal inspection


Neurologic/Psychiatric:  CNs II-XII nml as tested, alert, normal mood/affect, 

oriented x 3


Skin:  normal color, warm/dry


Lymphatic:  no adenopathy





Data Review


Labs


Laboratory Tests


6/24/21 13:00: 


White Blood Count 7.1, Red Blood Count 4.69, Hemoglobin 14.7, Hematocrit 44, 

Mean Corpuscular Volume 94, Mean Corpuscular Hemoglobin 31, Mean Corpuscular 

Hemoglobin Concent 34, Red Cell Distribution Width 13.7, Platelet Count 262, 

Mean Platelet Volume 9.3, Immature Granulocyte % (Auto) 1, Neutrophils (%) 

(Auto) 61, Lymphocytes (%) (Auto) 30, Monocytes (%) (Auto) 8, Eosinophils (%) 

(Auto) 1, Basophils (%) (Auto) 0, Neutrophils # (Auto) 4.3, Lymphocytes # (Auto)

2.1, Monocytes # (Auto) 0.6, Eosinophils # (Auto) 0.0, Basophils # (Auto) 0.0, 

Immature Granulocyte # (Auto) 0.0, Erythrocyte Sedimentation Rate 15, Sodium 

Level 134L, Potassium Level 4.3, Chloride Level 103, Carbon Dioxide Level 22, 

Anion Gap 9, Blood Urea Nitrogen 16, Creatinine 0.82, Estimat Glomerular 

Filtration Rate > 60, BUN/Creatinine Ratio 20, Glucose Level 94, Calcium Level 

9.2, Corrected Calcium 9.1, Total Bilirubin 0.3, Aspartate Amino Transf 

(AST/SGOT) 18, Alanine Aminotransferase (ALT/SGPT) 21, Alkaline Phosphatase 50, 

Total Protein 8.2, Albumin 4.1, Amylase Level 49, Lipase 62





Assessment/Plan


Right upper quadrant, left lower quadrant abdominal pain


Acute diverticulitis





Patient is 66-year-old female with recent treatment for acute diverticulitis.  

Patient with recurrent pain.  Patient on Rocephin and Flagyl.  Will keep n.p.o. 

for bowel rest.  IV fluids.  CT scan pending











RICO ACKERMAN DO              Jun 24, 2021 15:36

## 2021-06-24 NOTE — DIAGNOSTIC IMAGING REPORT
PROCEDURE: CT abdomen and pelvis without contrast.



TECHNIQUE: Multiple contiguous axial images were obtained through

the abdomen and pelvis without the use of intravenous contrast.

Auto Exposure Controls were utilized during the CT exam to meet

ALARA standards for radiation dose reduction. 



INDICATION:  Sigmoid diverticulitis with worsening pain.



COMPARISON: 05/27/2021



There is low-density throughout the liver. Surgical findings are

seen at the gastroesophageal junction with mild hiatal hernia

present. Gallbladder surgically absent without evidence of

biliary ductal dilatation. There is no evidence of pancreatic,

adrenal gland or splenic abnormality. High density along the left

renal cortex is similar to the previous study. There is no

hydronephrosis or renal mass. No free fluid is seen within the

abdomen or pelvis. Mild inflammation at the level of the sigmoid

colon extending into the left pelvic sidewall is decreased

compared to previous examination. There is no evidence of new

colonic inflammation or focal fluid collection. Unopacified

urinary bladder is unremarkable. There is continued diffuse

lumbar spondylosis.



IMPRESSION: Decreased inflammation at the level of the sigmoid

colon without new abnormality or adverse change is identified.



Dictated by: 



  Dictated on workstation # [id number] Complex Repair And Rotation Flap Text: The defect edges were debeveled with a #15 scalpel blade.  The primary defect was closed partially with a complex linear closure.  Given the location of the remaining defect, shape of the defect and the proximity to free margins a rotation flap was deemed most appropriate for complete closure of the defect.  Using a sterile surgical marker, an appropriate advancement flap was drawn incorporating the defect and placing the expected incisions within the relaxed skin tension lines where possible.    The area thus outlined was incised deep to adipose tissue with a #15 scalpel blade.  The skin margins were undermined to an appropriate distance in all directions utilizing iris scissors.

## 2021-06-24 NOTE — HISTORY & PHYSICAL
History of Present Illness


History of Present Illness


Reason for visit/HPI


This is a 66 year old female who had been treated recently outpatient for acute 

sigmoid diverticulitis.  She presented to my office with complaint of worsening 

abdominal pain for several days.  She reports numerous bowel 

movements--diarrhea--the past 2 days as well as abdominal cramping doubling her 

over in pain.  She had pain in her abdomen on exam just with placing a 

stethoscope on her abdomen.  She will be directly admitted for further 

evaluation and treatment.


Date of Admission


Jun 24, 2021 at 12:36


Date Seen by a Provider:  Jun 24, 2021


Time Seen by a Provider:  12:40


I consulted on this patient on


6/24/21


 12:40


Attending Physician


Zuleika Slater DO


Admitting Physician


Zuleika Slater DO


Consult








Allergies and Home Medications


Allergies


Coded Allergies:  


     Penicillins (Verified  Allergy, Unknown, 4/5/14)


     clarithromycin (Verified  Allergy, Unknown, 6/27/18)


   Patient as tolerated azithromycin


     gluten (Verified  Allergy, Unknown, 4/7/14)


     metoclopramide (Unverified  Allergy, Unknown, 3/30/16)


     piroxicam (Verified  Allergy, Unknown, Pt has received Ketorolac w/o issue,

6/26/18)


     sulfacetamide sodium (Verified  Allergy, Unknown, 4/5/14)





Home Medications


Albuterol Sulfate 18 Gm Hfa.aer.ad, 2 PUFF INH Q4H PRN for SHORTNESS OF BREATH, 

(Reported)


Albuterol Sulfate 2.5 Mg/3 Ml Vial.neb, 2.5 MG NEB Q4H PRN for SHORTNESS OF 

BREATH, (Reported)


Amlodipine Besylate 5 Mg Tablet, 5 MG PO BID, (Reported)


Azithromycin 250 Mg Tablet, PO UD, (Reported)


   5 DAY SUPPLY FILLED 6-25-18 


Bisoprolol Fumarate 10 Mg Tablet, 10 MG PO BID, (Reported)


Cefdinir 300 Mg Capsule, 300 MG PO BID


   Prescribed by: ZULEIKA SLATER on 6/29/18 1109


Cholecalciferol (Vitamin D3) 2,000 Unit Capsule, 2,000 UNIT PO DAILY, (Reported)


Cyanocobalamin 1,000 Mcg/Ml Inj, 1,000 MCG IM EVERY 2 WEEKS, (Reported)


Docusate Sodium 100 Mg Capsule, 200 MG PO BID, (Reported)


Duloxetine HCl 60 Mg Capsule.dr, 60 MG PO HS, (Reported)


Ergocalciferol (Vitamin D2) 50,000 Unit Capsule, 50,000 UNITS PO Sa, (Reported)


Estradiol 1 Each Patch.tdsw, 1 PATCH TD EVERY 3 DAYS, (Reported)


Fexofenadine HCl 180 Mg Tablet, 180 MG PO DAILY PRN for ALLERGIES, (Reported)


Gabapentin 300 Mg Capsule, 300 MG PO HS, (Reported)


Hyoscyamine Sulfate 0.125 Mg Tab.subl, 0.125 MG PO Q6H PRN for SPASMS, 

(Reported)


Levothyroxine Sodium 75 Mcg Tablet, 75 MCG PO DAILY, (Reported)


Liothyronine Sodium 5 Mcg Tablet, 5 MCG PO DAILY, (Reported)


Mesalamine 250 Mg Capsule.er, 250 MG PO DAILY, (Reported)


Pantoprazole Sodium 40 Mg Tablet.dr, 40 MG PO BID, (Reported)


Sennosides/Docusate Sodium 1 Each Tablet, 2 TAB PO BID PRN for CONSTIPATION-5TH 

LINE, (Reported)


Sitagliptin Phosphate 100 Mg Tablet, 100 MG PO DAILY, (Reported)





Patient Home Medication List


Home Medication List Reviewed:  Yes





Past Medical-Social-Family Hx


Past Med/Social Hx:  Reviewed Nursing Past Med/Soc Hx


Patient Social History


Marrital Status:  single, 


Recent Foreign Travel:  No


Contact w/other who traveled:  No


Recent Hopitalizations:  No





Immunizations Up To Date


Tetanus Booster (TDap):  Less than 5yrs


Pediatric:  Yes


Date of Pneumonia Vaccine:  Sep 6, 2012


Date of Influenza Vaccine:  Nov 10, 2016





Seasonal Allergies


Seasonal Allergies:  No





Past Medical History


Surgeries:  Appendectomy, Breast, Cardiac, Gallbladder, Hysterectomy, 

Oophorectomy, Orthopedic, Thyroidectomy, Tonsillectomy


Currently Using CPAP:  No


Currently Using BIPAP:  No


Cardiac:  Coronary Artery Disease, Heart Attack, Valvular Heart Disease


Reproductive:  Yes (TOTAL HYSTERECTOMY)


Sexually Transmitted Disease:  No


HIV/AIDS:  No


Female Reproductive Disorders:  Menstrual Problems, Endometriosis, Ovarian Cyst


Hysterectomy


Genitourinary:  Kidney Infection, Kidney Stones, UTI-Chronic


Gastrointestinal:  Gastroesophageal Reflux, Crohns Disease, Diverticulosis, 

Polyps, Hiatal Hernia, Ulcer, Gall Bladder Disease, Irritable Bowel


Musculoskeletal:  Degenerate Disk Disease, Arthritis, Chronic Back Pain


Endocrine:  Hypothyroidsim, Lupus


Loss of Vision:  Denies


Hearing Impairment:  Denies


History of Blood Disorders:  No


Adverse Reaction to Blood [place]:  No





Family History





Family history: Cardiovascular disease


Family history: Diabetes mellitus


  03 MOTHER


  09 BROTHER


Family history: Glaucoma


  03 MOTHER


Family history: Thyroid disorder


  03 MOTHER


Heart disease


  03 FATHER (AORTIC VALVE REPLACED)





Review of Systems


Constitutional:  weakness


EENTM:  No see HPI, No no symptoms reported, No ear discharge, No hearing loss, 

No ear pain, No blurred vision, No double vision, No eye pain, No tearing, No 

vision loss, No dental problems, No hoarseness, No mouth pain, No mouth 

swelling, No epistaxis, No nose congestion, No nose pain, No throat pain, No 

throat swelling, No other


Respiratory:  No no symptoms reported, No see HPI, No cough, No dyspnea on 

exertion, No hemoptysis, No orthopnea, No phlegm, No short of breath, No 

stridor, No wheezing, No other


Cardiovascular:  No no symptoms reported, No see HPI, No chest pain, No edema, 

No Hx of Intervention, No palpitations, No syncope, No vascular heart diseas, No

other


Gastrointestinal:  abdominal pain, diarrhea, heartburn, loss of appetite, nausea


Genitourinary:  No no symptoms reported, No see HPI, No decreased output, No 

discharge, No dysuria, No frequency, No hematuria, No hesitancy, No 

incontinence, No nocturia, No pain, No other


Musculoskeletal:  back pain


Skin:  No no symptoms reported, No see HPI, No change in color, No change in 

hair/nails, No dryness, No hx of skin cancer, No lesions, No lumps, No pruritus,

No rash, No other


Psychiatric/Neurological:  Weakness





Physical Exam


Vital Signs


Capillary Refill :


Height, Weight, BMI


Height: 5'6.00"


Weight: 182lbs. 0.0oz. 82.276256qu; 30.11 BMI


Method:Stated


General Appearance:  Moderate Distress (due to pain)


HEENT:  Normal ENT Inspection


Neck:  Supple


Respiratory:  Lungs Clear


Cardiovascular:  Regular Rate, Rhythm, Gallop/S4


Gastrointestinal:  Soft, Abnormal Bowel Sounds, Guarding, Rebound, Tenderness 

(diffuse but worse in RLQ/LLQ and RUQ)


Rectal:  Deferred


Back:  No CVA Tenderness


Extremity:  Non Tender, No Calf Tenderness, No Pedal Edema


Neurologic/Psychiatric:  Alert, Oriented x3


Skin:  Warm/Dry





Assessment/Plan


Assessment and Plan


1.  Acute Sigmoid Diverticulitis with Worsening Abdominal Pain--admit for gut 

rest, IVFs, IV abx, repeat CT abdomen/pelvis, consult surgery, pain control, 

check CBC, CMP, ESR


2.  Hypertension--resume home meds


3.  DMII--accuchecks with SSI B


4.  GERD--IV protonix





Admission Diagnosis


Admission Status:  Inpatient Order (span 2 midnights)


Reason for Inpatient Admission:  


Will need gut rest, IV abx, IVFs











ZULEIKA SLATER DO        Jun 24, 2021 12:46

## 2021-06-25 VITALS — DIASTOLIC BLOOD PRESSURE: 70 MMHG | SYSTOLIC BLOOD PRESSURE: 135 MMHG

## 2021-06-25 VITALS — SYSTOLIC BLOOD PRESSURE: 124 MMHG | DIASTOLIC BLOOD PRESSURE: 71 MMHG

## 2021-06-25 VITALS — SYSTOLIC BLOOD PRESSURE: 118 MMHG | DIASTOLIC BLOOD PRESSURE: 60 MMHG

## 2021-06-25 VITALS — DIASTOLIC BLOOD PRESSURE: 65 MMHG | SYSTOLIC BLOOD PRESSURE: 101 MMHG

## 2021-06-25 VITALS — SYSTOLIC BLOOD PRESSURE: 113 MMHG | DIASTOLIC BLOOD PRESSURE: 57 MMHG

## 2021-06-25 VITALS — SYSTOLIC BLOOD PRESSURE: 125 MMHG | DIASTOLIC BLOOD PRESSURE: 69 MMHG

## 2021-06-25 VITALS — DIASTOLIC BLOOD PRESSURE: 73 MMHG | SYSTOLIC BLOOD PRESSURE: 132 MMHG

## 2021-06-25 RX ADMIN — ONDANSETRON PRN MG: 2 INJECTION, SOLUTION INTRAMUSCULAR; INTRAVENOUS at 07:37

## 2021-06-25 RX ADMIN — TRAZODONE HYDROCHLORIDE SCH MG: 50 TABLET ORAL at 21:27

## 2021-06-25 RX ADMIN — ONDANSETRON PRN MG: 2 INJECTION, SOLUTION INTRAMUSCULAR; INTRAVENOUS at 22:54

## 2021-06-25 RX ADMIN — POLYETHYLENE GLYCOL (3350) SCH GM: 17 POWDER, FOR SOLUTION ORAL at 21:25

## 2021-06-25 RX ADMIN — DEXTROSE MONOHYDRATE AND SODIUM CHLORIDE SCH MLS/HR: 5; .45 INJECTION, SOLUTION INTRAVENOUS at 04:03

## 2021-06-25 RX ADMIN — METRONIDAZOLE SCH MLS/HR: 5 INJECTION, SOLUTION INTRAVENOUS at 05:40

## 2021-06-25 RX ADMIN — ONDANSETRON PRN MG: 2 INJECTION, SOLUTION INTRAMUSCULAR; INTRAVENOUS at 04:03

## 2021-06-25 RX ADMIN — ONDANSETRON PRN MG: 2 INJECTION, SOLUTION INTRAMUSCULAR; INTRAVENOUS at 12:38

## 2021-06-25 RX ADMIN — DULOXETINE HYDROCHLORIDE SCH MG: 30 CAPSULE, DELAYED RELEASE ORAL at 21:28

## 2021-06-25 RX ADMIN — ONDANSETRON PRN MG: 2 INJECTION, SOLUTION INTRAMUSCULAR; INTRAVENOUS at 18:22

## 2021-06-25 RX ADMIN — ENOXAPARIN SODIUM SCH MG: 100 INJECTION SUBCUTANEOUS at 12:38

## 2021-06-25 RX ADMIN — MORPHINE SULFATE PRN MG: 4 INJECTION, SOLUTION INTRAMUSCULAR; INTRAVENOUS at 07:37

## 2021-06-25 RX ADMIN — INSULIN ASPART SCH UNIT: 100 INJECTION, SOLUTION INTRAVENOUS; SUBCUTANEOUS at 15:41

## 2021-06-25 RX ADMIN — DEXTROSE MONOHYDRATE AND SODIUM CHLORIDE SCH MLS/HR: 5; .45 INJECTION, SOLUTION INTRAVENOUS at 12:39

## 2021-06-25 RX ADMIN — MORPHINE SULFATE PRN MG: 4 INJECTION, SOLUTION INTRAMUSCULAR; INTRAVENOUS at 00:16

## 2021-06-25 RX ADMIN — METRONIDAZOLE SCH MLS/HR: 5 INJECTION, SOLUTION INTRAVENOUS at 22:54

## 2021-06-25 RX ADMIN — DEXTROSE MONOHYDRATE AND SODIUM CHLORIDE SCH MLS/HR: 5; .45 INJECTION, SOLUTION INTRAVENOUS at 22:05

## 2021-06-25 RX ADMIN — PANTOPRAZOLE SODIUM SCH MG: 40 INJECTION, POWDER, FOR SOLUTION INTRAVENOUS at 21:26

## 2021-06-25 RX ADMIN — METRONIDAZOLE SCH MLS/HR: 5 INJECTION, SOLUTION INTRAVENOUS at 15:46

## 2021-06-25 RX ADMIN — INSULIN ASPART SCH UNIT: 100 INJECTION, SOLUTION INTRAVENOUS; SUBCUTANEOUS at 11:32

## 2021-06-25 RX ADMIN — SODIUM CHLORIDE SCH MLS/HR: 900 INJECTION INTRAVENOUS at 12:58

## 2021-06-25 RX ADMIN — MELATONIN 3 MG ORAL TABLET SCH MG: 3 TABLET ORAL at 21:25

## 2021-06-25 RX ADMIN — CARVEDILOL SCH EA: 25 TABLET, FILM COATED ORAL at 10:19

## 2021-06-25 RX ADMIN — ONDANSETRON PRN MG: 2 INJECTION, SOLUTION INTRAMUSCULAR; INTRAVENOUS at 00:16

## 2021-06-25 RX ADMIN — INSULIN ASPART SCH UNIT: 100 INJECTION, SOLUTION INTRAVENOUS; SUBCUTANEOUS at 21:23

## 2021-06-25 RX ADMIN — PANTOPRAZOLE SODIUM SCH MG: 40 INJECTION, POWDER, FOR SOLUTION INTRAVENOUS at 10:56

## 2021-06-25 RX ADMIN — INSULIN ASPART SCH UNIT: 100 INJECTION, SOLUTION INTRAVENOUS; SUBCUTANEOUS at 05:47

## 2021-06-25 RX ADMIN — MORPHINE SULFATE PRN MG: 4 INJECTION, SOLUTION INTRAMUSCULAR; INTRAVENOUS at 04:03

## 2021-06-25 RX ADMIN — MORPHINE SULFATE PRN MG: 4 INJECTION, SOLUTION INTRAMUSCULAR; INTRAVENOUS at 18:22

## 2021-06-25 RX ADMIN — MORPHINE SULFATE PRN MG: 4 INJECTION, SOLUTION INTRAMUSCULAR; INTRAVENOUS at 12:38

## 2021-06-25 RX ADMIN — BISOPROLOL FUMARATE SCH MG: 5 TABLET ORAL at 21:28

## 2021-06-25 NOTE — PROGRESS NOTE - SURGERY
Subjective


Date Seen by a Provider:  Jun 25, 2021


Time Seen by a Provider:  07:30


Subjective/Events-last exam


Patient states that she had a little bit of a rough night.  She still having 

pain more in the left lower quadrant.  The right upper quadrant pain has pretty 

much gone away.  Patient not having any nausea or vomiting.  She denies any 

fever sweats chills shortness of breath or chest pain.  Patient CT scan showing 

some inflammation around the sigmoid colon however this is decreased from last 

scan.





Objective


Exam





Vital Signs








  Date Time  Temp Pulse Resp B/P (MAP) Pulse Ox O2 Delivery O2 Flow Rate FiO2


 


6/25/21 03:54 35.6 62 16 101/65 (77) 90 Room Air  


 


6/25/21 00:16 36.3 69 16 132/73 (92) 90 Room Air  


 


6/24/21 20:45 36.5 64 16 122/68 (86) 95 Room Air  


 


6/24/21 20:00      Room Air  


 


6/24/21 16:38      Room Air  


 


6/24/21 16:15 36.4 64 16 115/66 (82) 95 Room Air  


 


6/24/21 13:58      Room Air  


 


6/24/21 13:20 35.6 65 18 138/65 (89) 94 Room Air  














I & O 


 


 6/25/21





 07:00


 


Intake Total 0 ml


 


Balance 0 ml





Capillary Refill :


General Appearance:  No Apparent Distress


HEENT:  PERRL/EOMI, Normal ENT Inspection


Neck:  Supple


Respiratory:  Chest Non Tender, No Accessory Muscle Use, No Respiratory Distress


Cardiovascular:  Regular Rate, Rhythm, No JVD, Gallop/S4


Gastrointestinal:  soft, tenderness (Left lower quadrant)


Extremity:  Non Tender, No Calf Tenderness, No Pedal Edema


Neurologic/Psychiatric:  Alert, Oriented x3


Skin:  Normal Color, Warm/Dry


Lymphatic:  No Adenopathy





Results


Lab


Laboratory Tests


6/24/21 13:00: 


White Blood Count 7.1, Red Blood Count 4.69, Hemoglobin 14.7, Hematocrit 44, M

nica Corpuscular Volume 94, Mean Corpuscular Hemoglobin 31, Mean Corpuscular 

Hemoglobin Concent 34, Red Cell Distribution Width 13.7, Platelet Count 262, 

Mean Platelet Volume 9.3, Immature Granulocyte % (Auto) 1, Neutrophils (%) 

(Auto) 61, Lymphocytes (%) (Auto) 30, Monocytes (%) (Auto) 8, Eosinophils (%) 

(Auto) 1, Basophils (%) (Auto) 0, Neutrophils # (Auto) 4.3, Lymphocytes # (Auto)

2.1, Monocytes # (Auto) 0.6, Eosinophils # (Auto) 0.0, Basophils # (Auto) 0.0, 

Immature Granulocyte # (Auto) 0.0, Erythrocyte Sedimentation Rate 15, Sodium 

Level 134L, Potassium Level 4.3, Chloride Level 103, Carbon Dioxide Level 22, 

Anion Gap 9, Blood Urea Nitrogen 16, Creatinine 0.82, Estimat Glomerular 

Filtration Rate > 60, BUN/Creatinine Ratio 20, Glucose Level 94, Calcium Level 

9.2, Corrected Calcium 9.1, Total Bilirubin 0.3, Aspartate Amino Transf 

(AST/SGOT) 18, Alanine Aminotransferase (ALT/SGPT) 21, Alkaline Phosphatase 50, 

Total Protein 8.2, Albumin 4.1, Amylase Level 49, Lipase 62


6/24/21 16:22: Glucometer 75


6/24/21 21:01: Glucometer 87


6/25/21 05:45: Glucometer 129H





Assessment/Plan


Right upper quadrant left lower quadrant abdominal pain.  The right upper 

quadrant pain improved


Acute diverticulitis





Patient CT scan results reviewed with her.  We'll continue with pain control, IV

fluids, IV antibiotics.  Bowel rest.  Advance diet when patient begins having 

pain improvement.  Patient agrees with plan.











RICO ACKERMAN DO              Jun 25, 2021 07:30

## 2021-06-26 VITALS — SYSTOLIC BLOOD PRESSURE: 137 MMHG | DIASTOLIC BLOOD PRESSURE: 84 MMHG

## 2021-06-26 VITALS — DIASTOLIC BLOOD PRESSURE: 60 MMHG | SYSTOLIC BLOOD PRESSURE: 132 MMHG

## 2021-06-26 VITALS — SYSTOLIC BLOOD PRESSURE: 137 MMHG | DIASTOLIC BLOOD PRESSURE: 61 MMHG

## 2021-06-26 VITALS — DIASTOLIC BLOOD PRESSURE: 58 MMHG | SYSTOLIC BLOOD PRESSURE: 131 MMHG

## 2021-06-26 LAB
ALBUMIN SERPL-MCNC: 3.6 GM/DL (ref 3.2–4.5)
ALP SERPL-CCNC: 45 U/L (ref 40–136)
ALT SERPL-CCNC: 22 U/L (ref 0–55)
BILIRUB SERPL-MCNC: 0.3 MG/DL (ref 0.1–1)
BUN/CREAT SERPL: 11
CALCIUM SERPL-MCNC: 7.7 MG/DL (ref 8.5–10.1)
CHLORIDE SERPL-SCNC: 103 MMOL/L (ref 98–107)
CO2 SERPL-SCNC: 24 MMOL/L (ref 21–32)
CREAT SERPL-MCNC: 0.85 MG/DL (ref 0.6–1.3)
GFR SERPLBLD BASED ON 1.73 SQ M-ARVRAT: > 60 ML/MIN
GLUCOSE SERPL-MCNC: 147 MG/DL (ref 70–105)
HCT VFR BLD CALC: 43 % (ref 35–52)
HGB BLD-MCNC: 14.1 G/DL (ref 11.5–16)
MCH RBC QN AUTO: 31 PG (ref 25–34)
MCHC RBC AUTO-ENTMCNC: 33 G/DL (ref 32–36)
MCV RBC AUTO: 94 FL (ref 80–99)
PLATELET # BLD: 224 10^3/UL (ref 130–400)
PMV BLD AUTO: 9.2 FL (ref 9–12.2)
POTASSIUM SERPL-SCNC: 4 MMOL/L (ref 3.6–5)
PROT SERPL-MCNC: 6.7 GM/DL (ref 6.4–8.2)
SODIUM SERPL-SCNC: 137 MMOL/L (ref 135–145)
WBC # BLD AUTO: 5.4 10^3/UL (ref 4.3–11)

## 2021-06-26 RX ADMIN — MORPHINE SULFATE PRN MG: 4 INJECTION, SOLUTION INTRAMUSCULAR; INTRAVENOUS at 20:25

## 2021-06-26 RX ADMIN — MORPHINE SULFATE PRN MG: 4 INJECTION, SOLUTION INTRAMUSCULAR; INTRAVENOUS at 15:02

## 2021-06-26 RX ADMIN — METRONIDAZOLE SCH MLS/HR: 5 INJECTION, SOLUTION INTRAVENOUS at 14:53

## 2021-06-26 RX ADMIN — INSULIN ASPART SCH UNIT: 100 INJECTION, SOLUTION INTRAVENOUS; SUBCUTANEOUS at 16:32

## 2021-06-26 RX ADMIN — ONDANSETRON PRN MG: 2 INJECTION, SOLUTION INTRAMUSCULAR; INTRAVENOUS at 02:40

## 2021-06-26 RX ADMIN — ENOXAPARIN SODIUM SCH MG: 100 INJECTION SUBCUTANEOUS at 13:28

## 2021-06-26 RX ADMIN — CARVEDILOL SCH MCG: 25 TABLET, FILM COATED ORAL at 06:50

## 2021-06-26 RX ADMIN — DULOXETINE HYDROCHLORIDE SCH MG: 30 CAPSULE, DELAYED RELEASE ORAL at 20:25

## 2021-06-26 RX ADMIN — POLYETHYLENE GLYCOL (3350) SCH GM: 17 POWDER, FOR SOLUTION ORAL at 09:33

## 2021-06-26 RX ADMIN — METRONIDAZOLE SCH MLS/HR: 5 INJECTION, SOLUTION INTRAVENOUS at 21:39

## 2021-06-26 RX ADMIN — INSULIN ASPART SCH UNIT: 100 INJECTION, SOLUTION INTRAVENOUS; SUBCUTANEOUS at 11:01

## 2021-06-26 RX ADMIN — ONDANSETRON PRN MG: 2 INJECTION, SOLUTION INTRAMUSCULAR; INTRAVENOUS at 15:03

## 2021-06-26 RX ADMIN — PANTOPRAZOLE SODIUM SCH MG: 40 INJECTION, POWDER, FOR SOLUTION INTRAVENOUS at 20:25

## 2021-06-26 RX ADMIN — BISOPROLOL FUMARATE SCH MG: 5 TABLET ORAL at 09:34

## 2021-06-26 RX ADMIN — PANTOPRAZOLE SODIUM SCH MG: 40 INJECTION, POWDER, FOR SOLUTION INTRAVENOUS at 09:33

## 2021-06-26 RX ADMIN — MORPHINE SULFATE PRN MG: 4 INJECTION, SOLUTION INTRAMUSCULAR; INTRAVENOUS at 05:22

## 2021-06-26 RX ADMIN — INSULIN ASPART SCH UNIT: 100 INJECTION, SOLUTION INTRAVENOUS; SUBCUTANEOUS at 05:30

## 2021-06-26 RX ADMIN — TRAZODONE HYDROCHLORIDE SCH MG: 50 TABLET ORAL at 20:25

## 2021-06-26 RX ADMIN — CARVEDILOL SCH EA: 25 TABLET, FILM COATED ORAL at 20:27

## 2021-06-26 RX ADMIN — CARVEDILOL SCH EA: 25 TABLET, FILM COATED ORAL at 06:50

## 2021-06-26 RX ADMIN — DEXTROSE MONOHYDRATE AND SODIUM CHLORIDE SCH MLS/HR: 5; .45 INJECTION, SOLUTION INTRAVENOUS at 17:29

## 2021-06-26 RX ADMIN — METRONIDAZOLE SCH MLS/HR: 5 INJECTION, SOLUTION INTRAVENOUS at 06:47

## 2021-06-26 RX ADMIN — SODIUM CHLORIDE SCH MLS/HR: 900 INJECTION INTRAVENOUS at 13:28

## 2021-06-26 RX ADMIN — DEXTROSE MONOHYDRATE AND SODIUM CHLORIDE SCH MLS/HR: 5; .45 INJECTION, SOLUTION INTRAVENOUS at 20:32

## 2021-06-26 RX ADMIN — MELATONIN 3 MG ORAL TABLET SCH MG: 3 TABLET ORAL at 20:25

## 2021-06-26 RX ADMIN — DEXTROSE MONOHYDRATE AND SODIUM CHLORIDE SCH MLS/HR: 5; .45 INJECTION, SOLUTION INTRAVENOUS at 06:56

## 2021-06-26 RX ADMIN — INSULIN ASPART SCH UNIT: 100 INJECTION, SOLUTION INTRAVENOUS; SUBCUTANEOUS at 20:57

## 2021-06-26 RX ADMIN — POLYETHYLENE GLYCOL (3350) SCH GM: 17 POWDER, FOR SOLUTION ORAL at 20:26

## 2021-06-26 NOTE — PROGRESS NOTE - SURGERY
Subjective


Time Seen by a Provider:  11:39


Subjective/Events-last exam


Pt seen and examined, states she is feeling much better than she has ever felt. 

However, she states she is still having some abdominal pain in the lower 

abdomen.  Denies N/V.


Review of Systems


General:  No Fatigue, No Malaise


Pulmonary:  No Dyspnea, No Cough


Cardiovascular:  No: Chest Pain, Palpitations


Gastrointestinal:  Abdominal Pain; No: Nausea, Vomiting





Objective


Exam





Vital Signs








  Date Time  Temp Pulse Resp B/P (MAP) Pulse Ox O2 Delivery O2 Flow Rate FiO2


 


6/26/21 08:00 36.1 66 18 131/58 (82) 95 Room Air  


 


6/26/21 05:00 36.4 74 18 137/84 (101) 95 Room Air  


 


6/25/21 23:52 36.6 64 20 118/60 (79) 93 Room Air  


 


6/25/21 20:00      Room Air  


 


6/25/21 19:45 36.4 74 18 125/69 (87) 96 Room Air  


 


6/25/21 15:30 36.2 61 16 135/70 (91) 95 Room Air  














I & O 


 


 6/26/21





 07:00


 


Intake Total 2210 ml


 


Balance 2210 ml





Capillary Refill :


General Appearance:  No Apparent Distress, WD/WN


HEENT:  PERRL/EOMI, Moist Mucous Membranes


Respiratory:  Lungs Clear, No Accessory Muscle Use, No Respiratory Distress


Cardiovascular:  Regular Rate, Rhythm, No Murmur


Gastrointestinal:  soft, no organomegaly, tenderness (lower abdomen)


Extremity:  No Pedal Edema


Neurologic/Psychiatric:  Alert, Oriented x3


Skin:  Normal Color, Warm/Dry





Results


Lab


Laboratory Tests


6/25/21 15:37: Glucometer 126H


6/25/21 19:51: Glucometer 131H


6/26/21 05:18: Glucometer 163H


6/26/21 05:22: 


White Blood Count 5.4, Red Blood Count 4.51, Hemoglobin 14.1, Hematocrit 43, 

Mean Corpuscular Volume 94, Mean Corpuscular Hemoglobin 31, Mean Corpuscular 

Hemoglobin Concent 33, Red Cell Distribution Width 13.6, Platelet Count 224, 

Mean Platelet Volume 9.2, Sodium Level 137, Potassium Level 4.0, Chloride Level 

103, Carbon Dioxide Level 24, Anion Gap 10, Blood Urea Nitrogen 9, Creatinine 

0.85, Estimat Glomerular Filtration Rate > 60, BUN/Creatinine Ratio 11, Glucose 

Level 147H, Calcium Level 7.7L, Corrected Calcium 8.0L, Total Bilirubin 0.3, 

Aspartate Amino Transf (AST/SGOT) 17, Alanine Aminotransferase (ALT/SGPT) 22, 

Alkaline Phosphatase 45, Total Protein 6.7, Albumin 3.6


6/26/21 10:27: Glucometer 128H





Assessment/Plan


Diverticulitis 


Hx of colon polyps


Strong Family hx of Colon CA


GERD with hx of esophageal dilation





I looked at the CT myself and don't really see much if any inflammation; 

however, she is still having pain and therefore I would only start her on 

clears.  Once pain is completely gone or if it doesn't get worse with clears; 

then can advance diet slowly.


We talked about the fact that she will need a colonoscopy in 6 weeks; partially 

because of the diverticulitis and partially because her last colonoscopy was 4 

years ago and they found 6-7 polyps.  At time of colonoscopy she would also 

benefit from


an EGD.











MANDIE CHOWDHURY DO               Jun 26, 2021 12:56

## 2021-06-27 VITALS — DIASTOLIC BLOOD PRESSURE: 67 MMHG | SYSTOLIC BLOOD PRESSURE: 145 MMHG

## 2021-06-27 VITALS — DIASTOLIC BLOOD PRESSURE: 65 MMHG | SYSTOLIC BLOOD PRESSURE: 137 MMHG

## 2021-06-27 VITALS — DIASTOLIC BLOOD PRESSURE: 58 MMHG | SYSTOLIC BLOOD PRESSURE: 124 MMHG

## 2021-06-27 RX ADMIN — MELATONIN 3 MG ORAL TABLET SCH MG: 3 TABLET ORAL at 20:01

## 2021-06-27 RX ADMIN — CARVEDILOL SCH EA: 25 TABLET, FILM COATED ORAL at 20:02

## 2021-06-27 RX ADMIN — INSULIN ASPART SCH UNIT: 100 INJECTION, SOLUTION INTRAVENOUS; SUBCUTANEOUS at 22:33

## 2021-06-27 RX ADMIN — METRONIDAZOLE SCH MLS/HR: 5 INJECTION, SOLUTION INTRAVENOUS at 22:23

## 2021-06-27 RX ADMIN — ENOXAPARIN SODIUM SCH MG: 100 INJECTION SUBCUTANEOUS at 12:27

## 2021-06-27 RX ADMIN — SODIUM CHLORIDE SCH MLS/HR: 900 INJECTION, SOLUTION INTRAVENOUS at 22:27

## 2021-06-27 RX ADMIN — METRONIDAZOLE SCH MLS/HR: 5 INJECTION, SOLUTION INTRAVENOUS at 05:55

## 2021-06-27 RX ADMIN — CARVEDILOL SCH EA: 25 TABLET, FILM COATED ORAL at 09:16

## 2021-06-27 RX ADMIN — INSULIN ASPART SCH UNIT: 100 INJECTION, SOLUTION INTRAVENOUS; SUBCUTANEOUS at 16:24

## 2021-06-27 RX ADMIN — MORPHINE SULFATE PRN MG: 4 INJECTION, SOLUTION INTRAMUSCULAR; INTRAVENOUS at 22:27

## 2021-06-27 RX ADMIN — CARVEDILOL SCH MCG: 25 TABLET, FILM COATED ORAL at 09:18

## 2021-06-27 RX ADMIN — CARVEDILOL SCH EA: 25 TABLET, FILM COATED ORAL at 09:15

## 2021-06-27 RX ADMIN — PANTOPRAZOLE SODIUM SCH MG: 40 TABLET, DELAYED RELEASE ORAL at 20:01

## 2021-06-27 RX ADMIN — SODIUM CHLORIDE SCH MLS/HR: 900 INJECTION INTRAVENOUS at 12:28

## 2021-06-27 RX ADMIN — PANTOPRAZOLE SODIUM SCH MG: 40 TABLET, DELAYED RELEASE ORAL at 11:32

## 2021-06-27 RX ADMIN — POLYETHYLENE GLYCOL (3350) SCH GM: 17 POWDER, FOR SOLUTION ORAL at 09:18

## 2021-06-27 RX ADMIN — METRONIDAZOLE SCH MLS/HR: 5 INJECTION, SOLUTION INTRAVENOUS at 12:28

## 2021-06-27 RX ADMIN — INSULIN ASPART SCH UNIT: 100 INJECTION, SOLUTION INTRAVENOUS; SUBCUTANEOUS at 11:24

## 2021-06-27 RX ADMIN — TRAZODONE HYDROCHLORIDE SCH MG: 50 TABLET ORAL at 20:01

## 2021-06-27 RX ADMIN — INSULIN ASPART SCH UNIT: 100 INJECTION, SOLUTION INTRAVENOUS; SUBCUTANEOUS at 05:36

## 2021-06-27 RX ADMIN — SODIUM CHLORIDE SCH MLS/HR: 900 INJECTION, SOLUTION INTRAVENOUS at 11:24

## 2021-06-27 RX ADMIN — DEXTROSE MONOHYDRATE AND SODIUM CHLORIDE SCH MLS/HR: 5; .45 INJECTION, SOLUTION INTRAVENOUS at 03:29

## 2021-06-27 RX ADMIN — SODIUM CHLORIDE SCH MLS/HR: 900 INJECTION, SOLUTION INTRAVENOUS at 21:15

## 2021-06-27 RX ADMIN — POLYETHYLENE GLYCOL (3350) SCH GM: 17 POWDER, FOR SOLUTION ORAL at 19:44

## 2021-06-27 NOTE — PROGRESS NOTE - SURGERY
Subjective


Time Seen by a Provider:  09:33


Subjective/Events-last exam


Pt seen and examined, stated she feels fine today with still a little bit of 

lower abdominal pain.  States last night she had a lot of pain and diarrhea 

after drinking some Ensure.   She is otherwise tolerating clears and 


would like more diet.   She is also asking about going home.


Review of Systems


General:  No Fatigue, No Malaise


Pulmonary:  No Dyspnea, No Cough


Cardiovascular:  No: Chest Pain, Palpitations


Gastrointestinal:  Abdominal Pain, Diarrhea; No: Nausea, Vomiting





Objective


Exam





Vital Signs








  Date Time  Temp Pulse Resp B/P (MAP) Pulse Ox O2 Delivery O2 Flow Rate FiO2


 


6/27/21 08:00 35.6 61 20 124/58 (80) 94 Room Air  


 


6/27/21 00:17 36.2 66 17 137/65 (89) 96 Room Air  


 


6/26/21 20:00     95 Room Air  


 


6/26/21 19:36 36.6 83 16 132/60 (84) 91 Room Air  


 


6/26/21 16:00 36.3 69 20 137/61 (86) 99 Room Air  














I & O 


 


 6/27/21





 07:00


 


Intake Total 3170 ml


 


Balance 3170 ml





Capillary Refill :


General Appearance:  No Apparent Distress, WD/WN


HEENT:  PERRL/EOMI, Moist Mucous Membranes


Respiratory:  Lungs Clear, No Accessory Muscle Use, No Respiratory Distress


Cardiovascular:  Regular Rate, Rhythm, No Murmur


Gastrointestinal:  soft, no organomegaly, tenderness (lower abdomen)


Extremity:  No Pedal Edema


Neurologic/Psychiatric:  Alert, Oriented x3


Skin:  Normal Color, Warm/Dry





Results


Lab


Laboratory Tests


6/26/21 10:27: Glucometer 128H


6/26/21 16:21: Glucometer 62L


6/26/21 20:49: Glucometer 136H


6/27/21 05:26: Glucometer 125H





Assessment/Plan


Diverticulitis 


Hx of colon polyps


Strong Family hx of Colon CA


GERD with hx of esophageal dilation





Pt is still having minimal pain, but slightly improved and therefore she can 

advance to soft diet.  Pt can be sent home if pain is controlled with oral meds 

and she tolerates increasing diet without increasing pain.  I still recommend 

colonoscopy as an


outpt and would wait to do EGD at the same time.











MANDIE CHOWDHURY DO               Jun 27, 2021 10:36

## 2021-06-28 VITALS — DIASTOLIC BLOOD PRESSURE: 74 MMHG | SYSTOLIC BLOOD PRESSURE: 170 MMHG

## 2021-06-28 VITALS — SYSTOLIC BLOOD PRESSURE: 123 MMHG | DIASTOLIC BLOOD PRESSURE: 56 MMHG

## 2021-06-28 RX ADMIN — METRONIDAZOLE SCH MLS/HR: 5 INJECTION, SOLUTION INTRAVENOUS at 05:35

## 2021-06-28 RX ADMIN — CARVEDILOL SCH EA: 25 TABLET, FILM COATED ORAL at 08:47

## 2021-06-28 RX ADMIN — ENOXAPARIN SODIUM SCH MG: 100 INJECTION SUBCUTANEOUS at 12:46

## 2021-06-28 RX ADMIN — PANTOPRAZOLE SODIUM SCH MG: 40 TABLET, DELAYED RELEASE ORAL at 08:50

## 2021-06-28 RX ADMIN — INSULIN ASPART SCH UNIT: 100 INJECTION, SOLUTION INTRAVENOUS; SUBCUTANEOUS at 05:55

## 2021-06-28 RX ADMIN — CARVEDILOL SCH MCG: 25 TABLET, FILM COATED ORAL at 08:49

## 2021-06-28 RX ADMIN — SODIUM CHLORIDE SCH MLS/HR: 900 INJECTION INTRAVENOUS at 12:47

## 2021-06-28 RX ADMIN — POLYETHYLENE GLYCOL (3350) SCH GM: 17 POWDER, FOR SOLUTION ORAL at 08:50

## 2021-06-28 RX ADMIN — CARVEDILOL SCH MCG: 25 TABLET, FILM COATED ORAL at 05:37

## 2021-06-28 RX ADMIN — CARVEDILOL SCH EA: 25 TABLET, FILM COATED ORAL at 08:49

## 2021-06-28 RX ADMIN — CARVEDILOL SCH EA: 25 TABLET, FILM COATED ORAL at 05:37

## 2021-06-28 RX ADMIN — MORPHINE SULFATE PRN MG: 4 INJECTION, SOLUTION INTRAMUSCULAR; INTRAVENOUS at 13:51

## 2021-06-28 RX ADMIN — INSULIN ASPART SCH UNIT: 100 INJECTION, SOLUTION INTRAVENOUS; SUBCUTANEOUS at 10:51

## 2021-09-09 ENCOUNTER — HOSPITAL ENCOUNTER (OUTPATIENT)
Dept: HOSPITAL 75 - RAD | Age: 66
End: 2021-09-09
Attending: FAMILY MEDICINE
Payer: MEDICARE

## 2021-09-09 DIAGNOSIS — R91.8: Primary | ICD-10-CM

## 2021-09-09 DIAGNOSIS — R10.11: ICD-10-CM

## 2021-09-09 DIAGNOSIS — R07.9: ICD-10-CM

## 2021-09-09 DIAGNOSIS — R05: ICD-10-CM

## 2021-09-09 DIAGNOSIS — Z98.890: ICD-10-CM

## 2021-09-09 LAB
ALBUMIN SERPL-MCNC: 4 GM/DL (ref 3.2–4.5)
ALP SERPL-CCNC: 53 U/L (ref 40–136)
ALT SERPL-CCNC: 23 U/L (ref 0–55)
BASOPHILS # BLD AUTO: 0 10^3/UL (ref 0–0.1)
BASOPHILS NFR BLD AUTO: 0 % (ref 0–10)
BILIRUB SERPL-MCNC: 0.5 MG/DL (ref 0.1–1)
BUN/CREAT SERPL: 17
CALCIUM SERPL-MCNC: 9.3 MG/DL (ref 8.5–10.1)
CHLORIDE SERPL-SCNC: 106 MMOL/L (ref 98–107)
CO2 SERPL-SCNC: 20 MMOL/L (ref 21–32)
CREAT SERPL-MCNC: 1.02 MG/DL (ref 0.6–1.3)
EOSINOPHIL # BLD AUTO: 0.1 10^3/UL (ref 0–0.3)
EOSINOPHIL NFR BLD AUTO: 1 % (ref 0–10)
GFR SERPLBLD BASED ON 1.73 SQ M-ARVRAT: 54 ML/MIN
GLUCOSE SERPL-MCNC: 137 MG/DL (ref 70–105)
HCT VFR BLD CALC: 41 % (ref 35–52)
HGB BLD-MCNC: 13.4 G/DL (ref 11.5–16)
LYMPHOCYTES # BLD AUTO: 1.9 10^3/UL (ref 1–4)
LYMPHOCYTES NFR BLD AUTO: 16 % (ref 12–44)
MANUAL DIFFERENTIAL PERFORMED BLD QL: NO
MCH RBC QN AUTO: 32 PG (ref 25–34)
MCHC RBC AUTO-ENTMCNC: 33 G/DL (ref 32–36)
MCV RBC AUTO: 98 FL (ref 80–99)
MONOCYTES # BLD AUTO: 0.7 10^3/UL (ref 0–1)
MONOCYTES NFR BLD AUTO: 6 % (ref 0–12)
NEUTROPHILS # BLD AUTO: 8.8 10^3/UL (ref 1.8–7.8)
NEUTROPHILS NFR BLD AUTO: 76 % (ref 42–75)
PLATELET # BLD: 286 10^3/UL (ref 130–400)
PMV BLD AUTO: 9.7 FL (ref 9–12.2)
POTASSIUM SERPL-SCNC: 4.5 MMOL/L (ref 3.6–5)
PROT SERPL-MCNC: 7.2 GM/DL (ref 6.4–8.2)
SODIUM SERPL-SCNC: 138 MMOL/L (ref 135–145)
WBC # BLD AUTO: 11.6 10^3/UL (ref 4.3–11)

## 2021-09-09 PROCEDURE — 74019 RADEX ABDOMEN 2 VIEWS: CPT

## 2021-09-09 PROCEDURE — 80053 COMPREHEN METABOLIC PANEL: CPT

## 2021-09-09 PROCEDURE — 36415 COLL VENOUS BLD VENIPUNCTURE: CPT

## 2021-09-09 PROCEDURE — 85025 COMPLETE CBC W/AUTO DIFF WBC: CPT

## 2021-09-09 PROCEDURE — 71046 X-RAY EXAM CHEST 2 VIEWS: CPT

## 2021-09-09 NOTE — DIAGNOSTIC IMAGING REPORT
INDICATION: Pain.



COMPARISON: Exam compared with chest x-ray of 02/09/2019.



FINDINGS: Some right perihilar curvilinear scarring and vague

subtle nodularity is less conspicuous than on the previous exam.

The left lung is clear. There is no effusion or pneumothorax. No

acute pulmonary abnormality.



IMPRESSION: Some curvilinear and nodular opacities in perihilar

right lower lung, decreased in conspicuity from previous study

with no adverse development, stable. Clear negative left lung and

no pleural pathology or failure.



Dictated by: 



  Dictated on workstation # [id number]

## 2021-09-09 NOTE — DIAGNOSTIC IMAGING REPORT
INDICATION: Right upper quadrant pain, cough, colonoscopy

performed yesterday at an outside facility.



FINDINGS: There is gas distention of the colon with air-fluid

levels at the sigmoid. The small bowel was nondilated. There is

some air within the stomach. No pneumatosis. No free gas. Post

surgical changes across the SI joint on the left as well as clips

in the gallbladder fossa, chronic.



IMPRESSION: There is gaseous distention of the colon, likely

retained air insufflation. No small bowel dilatation. No

extraluminal air collection to suggest viscus perforation. 



Dictated by: 



  Dictated on workstation # [id number]

## 2021-12-09 ENCOUNTER — HOSPITAL ENCOUNTER (OUTPATIENT)
Dept: HOSPITAL 75 - RAD | Age: 66
End: 2021-12-09
Attending: INTERNAL MEDICINE
Payer: MEDICARE

## 2021-12-09 DIAGNOSIS — E03.8: ICD-10-CM

## 2021-12-09 DIAGNOSIS — E04.1: Primary | ICD-10-CM

## 2021-12-09 DIAGNOSIS — Z90.89: ICD-10-CM

## 2021-12-09 PROCEDURE — 76536 US EXAM OF HEAD AND NECK: CPT

## 2021-12-09 NOTE — DIAGNOSTIC IMAGING REPORT
PROCEDURE: US Thyroid.



TECHNIQUE: Multiple real-time grayscale images were obtained of

the thyroid in various projections.



INDICATION:  Hypothyroidism



COMPARISON: 10/20/2016



FINDINGS: Left lobe of the thyroid gland is surgically absent.

There is an approximately 1.1 x 0.8 x 0.6 cm hypoechoic nodule in

the left thyroid bed.



Right lobe of thyroid gland measures 4.1 x 1.5 x 1.9 cm without

evidence of discrete mass.



IMPRESSION: Left thyroidectomy with 1.1 cm nodule in the left

thyroid bed. This could represent lymph node. Clinical

correlation would be useful. If there is concern for thyroid

neoplasm, consideration could be given to iodine nuclear medicine

study for characterization of left neck nodule.



Dictated by: 



  Dictated on workstation # [id number]

## 2022-04-20 ENCOUNTER — HOSPITAL ENCOUNTER (EMERGENCY)
Dept: HOSPITAL 75 - ER | Age: 67
Discharge: HOME | End: 2022-04-20
Payer: MEDICARE

## 2022-04-20 VITALS — HEIGHT: 66.02 IN | BODY MASS INDEX: 30.54 KG/M2 | WEIGHT: 190.04 LBS

## 2022-04-20 VITALS — SYSTOLIC BLOOD PRESSURE: 168 MMHG | DIASTOLIC BLOOD PRESSURE: 76 MMHG

## 2022-04-20 DIAGNOSIS — E11.9: ICD-10-CM

## 2022-04-20 DIAGNOSIS — R51.9: Primary | ICD-10-CM

## 2022-04-20 LAB
ALBUMIN SERPL-MCNC: 4.1 GM/DL (ref 3.2–4.5)
ALP SERPL-CCNC: 58 U/L (ref 40–136)
ALT SERPL-CCNC: 28 U/L (ref 0–55)
APTT BLD: 20 SEC (ref 24–35)
BASOPHILS # BLD AUTO: 0 10^3/UL (ref 0–0.1)
BASOPHILS NFR BLD AUTO: 0 % (ref 0–10)
BILIRUB SERPL-MCNC: 0.4 MG/DL (ref 0.1–1)
BUN/CREAT SERPL: 14
CALCIUM SERPL-MCNC: 8.7 MG/DL (ref 8.5–10.1)
CHLORIDE SERPL-SCNC: 102 MMOL/L (ref 98–107)
CO2 SERPL-SCNC: 22 MMOL/L (ref 21–32)
CREAT SERPL-MCNC: 0.97 MG/DL (ref 0.6–1.3)
EOSINOPHIL # BLD AUTO: 0.1 10^3/UL (ref 0–0.3)
EOSINOPHIL NFR BLD AUTO: 1 % (ref 0–10)
GFR SERPLBLD BASED ON 1.73 SQ M-ARVRAT: 64 ML/MIN
GLUCOSE SERPL-MCNC: 111 MG/DL (ref 70–105)
HCT VFR BLD CALC: 40 % (ref 35–52)
HGB BLD-MCNC: 13.5 G/DL (ref 11.5–16)
INR PPP: 0.8 (ref 0.8–1.4)
LIPASE SERPL-CCNC: 56 U/L (ref 8–78)
LYMPHOCYTES # BLD AUTO: 1.5 10^3/UL (ref 1–4)
LYMPHOCYTES NFR BLD AUTO: 13 % (ref 12–44)
MAGNESIUM SERPL-MCNC: 2.1 MG/DL (ref 1.6–2.4)
MANUAL DIFFERENTIAL PERFORMED BLD QL: NO
MCH RBC QN AUTO: 31 PG (ref 25–34)
MCHC RBC AUTO-ENTMCNC: 34 G/DL (ref 32–36)
MCV RBC AUTO: 93 FL (ref 80–99)
MONOCYTES # BLD AUTO: 0.7 10^3/UL (ref 0–1)
MONOCYTES NFR BLD AUTO: 6 % (ref 0–12)
NEUTROPHILS # BLD AUTO: 9.6 10^3/UL (ref 1.8–7.8)
NEUTROPHILS NFR BLD AUTO: 81 % (ref 42–75)
PLATELET # BLD: 267 10^3/UL (ref 130–400)
PMV BLD AUTO: 10.3 FL (ref 9–12.2)
POTASSIUM SERPL-SCNC: 4.3 MMOL/L (ref 3.6–5)
PROT SERPL-MCNC: 7.2 GM/DL (ref 6.4–8.2)
PROTHROMBIN TIME: 11.9 SEC (ref 12.2–14.7)
SODIUM SERPL-SCNC: 136 MMOL/L (ref 135–145)
WBC # BLD AUTO: 12 10^3/UL (ref 4.3–11)

## 2022-04-20 PROCEDURE — 83874 ASSAY OF MYOGLOBIN: CPT

## 2022-04-20 PROCEDURE — 71045 X-RAY EXAM CHEST 1 VIEW: CPT

## 2022-04-20 PROCEDURE — 85730 THROMBOPLASTIN TIME PARTIAL: CPT

## 2022-04-20 PROCEDURE — 36415 COLL VENOUS BLD VENIPUNCTURE: CPT

## 2022-04-20 PROCEDURE — 85025 COMPLETE CBC W/AUTO DIFF WBC: CPT

## 2022-04-20 PROCEDURE — 83690 ASSAY OF LIPASE: CPT

## 2022-04-20 PROCEDURE — 85379 FIBRIN DEGRADATION QUANT: CPT

## 2022-04-20 PROCEDURE — 93041 RHYTHM ECG TRACING: CPT

## 2022-04-20 PROCEDURE — 84484 ASSAY OF TROPONIN QUANT: CPT

## 2022-04-20 PROCEDURE — 93005 ELECTROCARDIOGRAM TRACING: CPT

## 2022-04-20 PROCEDURE — 83880 ASSAY OF NATRIURETIC PEPTIDE: CPT

## 2022-04-20 PROCEDURE — 83735 ASSAY OF MAGNESIUM: CPT

## 2022-04-20 PROCEDURE — 85610 PROTHROMBIN TIME: CPT

## 2022-04-20 PROCEDURE — 80053 COMPREHEN METABOLIC PANEL: CPT

## 2022-04-20 PROCEDURE — 70450 CT HEAD/BRAIN W/O DYE: CPT

## 2022-04-20 NOTE — DIAGNOSTIC IMAGING REPORT
INDICATION: Chest pain.



EXAMINATION: Portable chest at 4:51 p.m.



FINDINGS: Heart size and pulmonary vascularity are normal. Lungs

are clear. There are no effusions or pneumothoraces.



IMPRESSION: No acute abnormalities in the chest.



Dictated by: 



  Dictated on workstation # [id number]

## 2022-04-20 NOTE — DIAGNOSTIC IMAGING REPORT
PROCEDURE: CT head without contrast.



TECHNIQUE: Multiple contiguous axial images were obtained through

the brain without the use of intravenous contrast. Auto Exposure

Controls were utilized during the CT exam to meet ALARA standards

for radiation dose reduction. 



DATE: April 20, 2022.



COMPARISON: CT head April 11, 2014. 



INDICATION: 67-year-old female, severe headache.



FINDINGS: The ventricles and cerebral spinal fluid spaces are of

normal size and configuration for the patient's age. There is no

mass effect or midline shift. There is no acute intracranial

hemorrhage. There is no abnormal extra-axial fluid collection.

The visualized portions of the paranasal sinuses, mastoid air

cells and middle ears are well aerated. 



IMPRESSION: No identified acute intracranial abnormality. 



Dictated by: 



  Dictated on workstation # [id number]

## 2022-04-20 NOTE — ED CHEST PAIN
General


Chief Complaint:  Head/Cervical Problems


Stated Complaint:  HIGH BLOOD PRESSURE, SOB


Nursing Triage Note:  


PT STATES SHE HAS BEEN HAVING ALLERGY/SINUS PROBLEMS BUT TODAY C/O SEVERE 


HEADACHE THAT EXTENDS DOWN HER NECK ON THE RIGHT SIDE TO HER SHOULDER.


Source:  patient


Exam Limitations:  no limitations





History of Present Illness


Date Seen by Provider:  2022


Time Seen by Provider:  16:14


Initial Comments


Patient to the ER by private conveyance from [facility] with chief 

complaint for the past couple days she has had what she thought was allergies 

but describes it as a headache extending down into her right neck and into her 

right shoulder.  No chest pain or shortness of air.  She has a history of 

cryptococcus and is followed annually with a CT.  Dr. Slater is her primary 

care doctor and did not have any available space today in her schedule.  Patient

does not have a history of coronary disease but she has had a couple cardiac 

catheterizations by Dr. Strickland in the past.  She is having a little nausea but 

no vomiting and it went away.  No fevers or chills.  No productive cough.  No 

diarrhea dysuria.  She has hypertension that was worse after partial colon 

resection for diverticulitis.  She hasDiabetes, GERD, hypothyroidism, no history

of hyperlipidemia.  She is not a smoker.


Echocardiogram 2017 by Dr. Strickland demonstrating an EF of 60% cardiac 

catheterization  by Dr. Strickland demonstrating angiographically normal coronar

y arteries with an EF of 60%.





Allergies and Home Medications


Allergies


Coded Allergies:  


     Penicillins (Verified  Allergy, Unknown, 14)


     clarithromycin (Verified  Allergy, Unknown, 18)


   Patient as tolerated azithromycin


     gluten (Verified  Allergy, Unknown, 14)


     metoclopramide (Unverified  Allergy, Unknown, 3/30/16)


     piroxicam (Verified  Allergy, Unknown, Pt has received Ketorolac w/o issue,

18)


     sulfacetamide sodium (Verified  Allergy, Unknown, 14)





Patient Home Medication List


Home Medication List Reviewed:  Yes


Bisoprolol Fumarate (Bisoprolol Fumarate) 10 Mg Tablet, 10 MG PO BID, (Reported)


   Entered as Reported by: ALEX BEJARANO on 18 1531


Cefdinir (Cefdinir) 300 Mg Capsule, 300 MG PO BID


   Prescribed by: TIA SLATER on 21 1246


Cholecalciferol (Vitamin D3) (Vitamin D3) 125 Mcg Capsule, 125 MCG PO HS, 

(Reported)


   Entered as Reported by: DIPAK PHILLIPS on 21 143


Cyanocobalamin (Cyanocobalamin Injection) 1,000 Mcg/Ml Inj, 1,000 MCG IM EVERY 2

MONTHS, (Reported)


   Entered as Reported by: DIPAK PHILLIPS on 21 143


Duloxetine HCl (Duloxetine HCl) 60 Mg Capsule.dr, 60 MG PO HS, (Reported)


   Entered as Reported by: ALEX BEJARANO on 18 1518


Empagliflozin/Linagliptin (Glyxambi 25 mg-5 mg Tablet) 1 Each Tablet, 1 EA PO 

DAILY, (Reported)


   Entered as Reported by: DIPAK PHILLIPS on 21 143


Ergocalciferol (Vitamin D2) (Vitamin D2) 1,250 Mcg Capsule, 1,250 MCG PO SAT, 

(Reported)


   Entered as Reported by: DIPAK PHILLIPS on 21 143


Estradiol (Estradiol Patch Twice Weekly 0.1mg/hr) 1 Each Patch.tdsw, 1 PATCH TD 

TWICE WEEKLY, (Reported)


   Entered as Reported by: DIPAK PHILLIPS on 21 143


Fexofenadine HCl (Allegra Allergy) 180 Mg Tablet, 180 MG PO DAILY, (Reported)


   Entered as Reported by: DIPAK PHILLIPS on 21 143


Glipizide (Glipizide) 5 Mg Tablet, 5 MG PO DAILY, (Reported)


   Entered as Reported by: DIPAK PHILLIPS on 21 143


Hydrocodone/Acetaminophen (Hydrocodone-Acetamin 5-325 mg) 1 Each Tablet, 1-2 TAB

PO Q8H PRN for PAIN-MODERATE (5-7), (Reported)


   Entered as Reported by: DIPAK PHILLIPS on 21 143


Levothyroxine Sodium (Tirosint) 88 Mcg Capsule, 88 MCG PO DAILY, (Reported)


   Entered as Reported by: DIPAK PHILLIPS on 21 143


Liothyronine Sodium (Liothyronine Sodium) 5 Mcg Tablet, 5 MCG PO DAILY, 

(Reported)


   Entered as Reported by: ALEX BEJARANO on 18 1531


Metronidazole (Flagyl) 500 Mg Tablet, 500 MG PO TID


   Prescribed by: TIA SLATER on 21 1246


Pantoprazole Sodium (Pantoprazole Sodium) 40 Mg Tablet.dr, 40 MG PO HS, 

(Reported)


   Entered as Reported by: ALEX BEJARANO on 18 1518


Polyethylene Glycol 3350 (Miralax) 17 Gm Powd.pack, 17 GM PO BID, (Reported)


   Entered as Reported by: DIPAK PHILLIPS on 21 1437


Valsartan (Valsartan) 320 Mg Tablet, 320 MG PO DAILY, (Reported)


   Entered as Reported by: DIPAK PHILLIPS on 21 1437





Review of Systems


Review of Systems


Constitutional:  No chills, No diaphoresis


EENTM:  No Blurred Vision, No Double Vision


Respiratory:  Denies Cough, Denies Shortness of Air


Cardiovascular:  Denies Chest Pain, Denies Lightheadedness


Gastrointestinal:  Denies Abdominal Pain, Denies Constipated, Denies Diarrhea, 

Denies Nausea


Genitourinary:  Denies Burning, Denies Discharge, Denies Drainage


Musculoskeletal:  No back pain


Skin:  No change in color, No pruritus


Psychiatric/Neurological:  Denies Anxiety, Denies Depressed





All Other Systems Reviewed


Negative Unless Noted:  Yes





Past Medical-Social-Family Hx


Patient Social History


Tobacco Use?:  No


Use of E-Cig and/or Vaping dev:  No


Substance use?:  No


Alcohol Use?:  No


Pt feels they are or have been:  No





Immunizations Up To Date


Tetanus Booster (TDap):  Less than 5yrs


PED Vaccines UTD:  Yes


First/Initial COVID19 Vaccinat:  YES





Seasonal Allergies


Seasonal Allergies:  No





Past Medical History


Surgeries:  Yes


Appendectomy, Breast, Cardiac, Gallbladder, Hysterectomy, Oophorectomy, 

Orthopedic, Thyroidectomy, Tonsillectomy


Respiratory:  Yes


Asthma


Currently Using CPAP:  No


Currently Using BIPAP:  No


Cardiac:  Yes (mitral valve prolapse, heart caths no stents)


Coronary Artery Disease, Heart Attack, Valvular Heart Disease


Neurological:  Yes


Reproductive Disorders:  Yes (TOTAL HYSTERECTOMY)


Female Reproductive Disorders:  Menstrual Problems, Endometriosis, Ovarian Cyst


GYN History:  Hysterectomy


Sexually Transmitted Disease:  No


HIV/AIDS:  No


Kidney Infection, Kidney Stones, UTI-Chronic


Gastrointestinal:  Yes (CHRONIC ABDOMINAL PAIN )


Gastroesophageal Reflux, Crohns Disease, Diverticulosis, Polyps, Hiatal Hernia, 

Ulcer, Gall Bladder Disease, Irritable Bowel


Musculoskeletal:  Yes (ANKLYLOSING SPONDYLITIS)


Degenerate Disk Disease, Arthritis, Chronic Back Pain


Endocrine:  Yes


Hypothyroidsim, Lupus


Loss of Vision:  Denies


Hearing Impairment:  Denies


Cancer:  No


Psychosocial:  No


Integumentary:  Yes (STAPH INFECTIONS)


Blood Disorders:  No


Adverse Reaction/Blood Tranf:  No





Family Medical History





Family history: Cardiovascular disease


Family history: Diabetes mellitus


  03 MOTHER


  09 BROTHER


Family history: Glaucoma


  03 MOTHER


Family history: Thyroid disorder


  03 MOTHER


Heart disease


  03 FATHER (AORTIC VALVE REPLACED)


No Pertinent Family Hx





Physical Exam


Vital Signs





Vital Signs - First Documented








 22





 16:12


 


Temp 37.0


 


Pulse 81


 


Resp 20


 


B/P (MAP) 190/94 (126)


 


Pulse Ox 94


 


O2 Delivery Room Air





Capillary Refill :


Height, Weight, BMI


Height: 5'6.00"


Weight: 182lbs. 0.0oz. [id number]; 30.00 BMI


Method:Stated


General Appearance:  No Apparent Distress, WD/WN, Anxious


HEENT:  PERRL/EOMI, TMs Normal, Normal ENT Inspection, Pharynx Normal, Moist 

Mucous Membranes


Neck:  Full Range of Motion, Normal Inspection


Respiratory:  Lungs Clear, Normal Breath Sounds, No Accessory Muscle Use, No 

Respiratory Distress


Cardiovascular:  Regular Rate, Rhythm, No Edema, Normal Peripheral Pulses


Gastrointestinal:  Normal Bowel Sounds, No Organomegaly, Non Tender, Soft


Extremity:  Normal Capillary Refill, Normal Inspection


Neurologic/Psychiatric:  Alert, Oriented x3, Normal Mood/Affect, CNs II-XII Norm

as Tested


Skin:  Normal Color, Warm/Dry





Progress/Results/Core Measures


Results/Orders


Lab Results





Laboratory Tests








Test


 22


16:13 22


17:40 Range/Units


 


 


White Blood Count


 12.0 H


 


 4.3-11.0


10^3/uL


 


Red Blood Count


 4.31 


 


 3.80-5.11


10^6/uL


 


Hemoglobin 13.5   11.5-16.0  g/dL


 


Hematocrit 40   35-52  %


 


Mean Corpuscular Volume 93   80-99  fL


 


Mean Corpuscular Hemoglobin 31   25-34  pg


 


Mean Corpuscular Hemoglobin


Concent 34 


 


 32-36  g/dL





 


Red Cell Distribution Width 13.2   10.0-14.5  %


 


Platelet Count


 267 


 


 130-400


10^3/uL


 


Mean Platelet Volume 10.3   9.0-12.2  fL


 


Immature Granulocyte % (Auto) 0    %


 


Neutrophils (%) (Auto) 81 H  42-75  %


 


Lymphocytes (%) (Auto) 13   12-44  %


 


Monocytes (%) (Auto) 6   0-12  %


 


Eosinophils (%) (Auto) 1   0-10  %


 


Basophils (%) (Auto) 0   0-10  %


 


Neutrophils # (Auto)


 9.6 H


 


 1.8-7.8


10^3/uL


 


Lymphocytes # (Auto)


 1.5 


 


 1.0-4.0


10^3/uL


 


Monocytes # (Auto)


 0.7 


 


 0.0-1.0


10^3/uL


 


Eosinophils # (Auto)


 0.1 


 


 0.0-0.3


10^3/uL


 


Basophils # (Auto)


 0.0 


 


 0.0-0.1


10^3/uL


 


Immature Granulocyte # (Auto)


 0.0 


 


 0.0-0.1


10^3/uL


 


Percent Immature Platelet


Fraction 4.0 


 


 0.0-7.6  %





 


Prothrombin Time 11.9 L  12.2-14.7  SEC


 


INR Comment 0.8   0.8-1.4  


 


Activated Partial


Thromboplast Time 20 L


 


 24-35  SEC





 


D-Dimer


 0.84 H


 


 0.00-0.49


UG/ML


 


Sodium Level 136   135-145  MMOL/L


 


Potassium Level 4.3   3.6-5.0  MMOL/L


 


Chloride Level 102     MMOL/L


 


Carbon Dioxide Level 22   21-32  MMOL/L


 


Anion Gap 12   5-14  MMOL/L


 


Blood Urea Nitrogen 14   7-18  MG/DL


 


Creatinine


 0.97 


 


 0.60-1.30


MG/DL


 


Estimat Glomerular Filtration


Rate 64 


 


  





 


BUN/Creatinine Ratio 14    


 


Glucose Level 111 H    MG/DL


 


Calcium Level 8.7   8.5-10.1  MG/DL


 


Corrected Calcium 8.6   8.5-10.1  MG/DL


 


Magnesium Level 2.1   1.6-2.4  MG/DL


 


Total Bilirubin 0.4   0.1-1.0  MG/DL


 


Aspartate Amino Transf


(AST/SGOT) 21 


 


 5-34  U/L





 


Alanine Aminotransferase


(ALT/SGPT) 28 


 


 0-55  U/L





 


Alkaline Phosphatase 58     U/L


 


Myoglobin


 42.8 


 


 10.0-92.0


NG/ML


 


Troponin I < 0.028  < 0.028  <0.028  NG/ML


 


B-Type Natriuretic Peptide 271.7 H  <100.0  PG/ML


 


Total Protein 7.2   6.4-8.2  GM/DL


 


Albumin 4.1   3.2-4.5  GM/DL


 


Lipase 56   8-78  U/L








My Orders





Orders - LAURIE ECHEVERRIA PAOLA


Cbc With Automated Diff (22 16:31)


Magnesium (22 16:31)


Chest 1 View, Ap/Pa Only (22 16:31)


Ekg Tracing (22 16:)


Comprehensive Metabolic Panel (22 16:)


Myoglobin Serum (22 16:31)


Protime With Inr (22 16:)


Partial Thromboplastin Time (22 16:)


O2 (22 16:)


Monitor-Rhythm Ecg Trace Only (22 16:)


Lipid Panel (22 06:00)


Ed Iv/Invasive Line Start (22 16:31)


Lipase (22 16:31)


Bnp [place] (22 16:31)


Fibrin Degradation Products (22 16:31)


Troponin I [place] (22 16:31)


Aspirin Chewable Tablet (Baby Aspirin Ch (22 16:45)


Ondansetron Injection (Zofran Injectio (22 17:00)


Troponin I [place] (22 17:23)


Ekg Tracing (22 17:23)


Ct Head Wo (22 17:23)





Medications Given in ED





Current Medications








 Medications  Dose


 Ordered  Sig/Antoine


 Route  Start Time


 Stop Time Status Last Admin


Dose Admin


 


 Aspirin  324 mg  ONCE  ONCE


 PO  22 16:45


 22 16:46 DC 22 16:36


324 MG


 


 Ondansetron HCl  8 mg  ONCE  ONCE


 IVP  22 17:00


 22 17:01 DC 22 17:11


8 MG








Vital Signs/I&O











 22





 16:12


 


Temp 37.0


 


Pulse 81


 


Resp 20


 


B/P (MAP) 190/94 (126)


 


Pulse Ox 94


 


O2 Delivery Room Air














Blood Pressure Mean:                    126











Progress


Progress Note :  


   Time:  16:38


Progress Note


Patient could be experiencing some atypical anginal symptoms with her neck and 

shoulder pain however she states it originates from a headache.  Her blood 

pressure is up 190 systolic on arrival.  She says ever since she had a partial 

colon resection for diverticulitis she is had problems with elevated blood 

pressure.


Initial ECG Impression Date:  2022


Initial ECG Impression Time:  16:37


Initial ECG Rate:  80


Initial ECG Rhythm:  Normal Sinus


Initial ECG Intervals:  Normal


Initial ECG Impression:  Normal


Initial ECG Comparisson:  Unchanged


Comment


Normal sinus rhythm without clinically relevant ST elevation or depression.


EKG #1:  


   Rate:  81


   Rhythm:  Normal Sinus


   Intervals:  Normal


   ECG Impression:  Normal


Comment


Normal sinus rhythm without clinically relevant ST elevation or depression


EKG #2:  


   EKG Time:  17:55


   Rate:  80


   Rhythm:  Normal Sinus


   Intervals:  Normal


   ECG Comparisson:  Unchanged


   ECG Impression:  Normal


Comment


Normal sinus rhythm without clinically relevant ST elevation or depression.





Diagnostic Imaging





   Diagonstic Imaging:  Xray


   Plain Films/CT/US/NM/MRI:  chest


Comments


                 [facility] [place], [place]





NAME:   DOMINGO CONRAD


MED REC#:   [id number]


ACCOUNT#:   [id number]


PT STATUS:   REG ER


:   1955


PHYSICIAN:   LAURIE ECHEVERRIA MD


ADMIT DATE:   22/ER


                                  ***Signed***


Date of Exam:22





CHEST 1 VIEW, AP/PA ONLY








INDICATION: Chest pain.





EXAMINATION: Portable chest at 4:51 p.m.





FINDINGS: Heart size and pulmonary vascularity are normal. Lungs


are clear. There are no effusions or pneumothoraces.





IMPRESSION: No acute abnormalities in the chest.





Dictated by: 





  Dictated on workstation # [id number]








Dict:   22


Trans:   22


 [id number]





Interpreted by:     EMANUEL MCCRARY MD


Electronically signed by: EMANUEL MCCRARY MD 22


   Reviewed:  Reviewed by Me








   Diagonstic Imaging:  CT


   Plain Films/CT/US/NM/MRI:  head


Comments


                 [facility] [place], [place]





NAME:   TERESITA CONRADJOSEE CLEMENT


MED REC#:   [id number]


ACCOUNT#:   [id number]


PT STATUS:   REG ER


:   1955


PHYSICIAN:   LAURIE ECHEVERRIA MD


ADMIT DATE:   22/ER


                                  ***Signed***


Date of Exam:22





CT HEAD WO








PROCEDURE: CT head without contrast.





TECHNIQUE: Multiple contiguous axial images were obtained through


the brain without the use of intravenous contrast. Auto Exposure


Controls were utilized during the CT exam to meet ALARA standards


for radiation dose reduction. 





DATE: 2022.





COMPARISON: CT head 2014. 





INDICATION: 67-year-old female, severe headache.





FINDINGS: The ventricles and cerebral spinal fluid spaces are of


normal size and configuration for the patient's age. There is no


mass effect or midline shift. There is no acute intracranial


hemorrhage. There is no abnormal extra-axial fluid collection.


The visualized portions of the paranasal sinuses, mastoid air


cells and middle ears are well aerated. 





IMPRESSION: No identified acute intracranial abnormality. 





Dictated by: 





  Dictated on workstation # [id number]








Dict:   22


Trans:   22


PJE [id number]





Interpreted by:     KULWINDER LUO MD


Electronically signed by: KULWINDER LUO MD 22


   Reviewed:  Reviewed by Me


Consults :  


   Consulting Physician:  PALAK STRICKLAND MD FACP FAC CCDS


Consults Notes


Discussed the case with Dr. Strickland, cardiology and he recommends a repeat 

troponin and he will follow-up with her in the clinic.  As far as work-up of the

headache he would recommend a CT noncontrast





Departure


Impression





   Primary Impression:  


   Headache


   Qualified Codes:  G44.201 - Tension-type headache, unspecified, intractable


   Additional Impression:  


   Atypical angina suspected


Disposition:   HOME, SELF-CARE


Condition:  Stable





Departure-Patient Inst.


Decision time for Depature:  18:00


Referrals:  


TIA SLATER DO (PCP/Family)


Primary Care Physician


Patient Instructions:  Headache, Adult ED





Add. Discharge Instructions:  


Call Dr. Strickland's office and he would be happy to see you first thing in the 

morning.


Return to the ER for significantly worsening symptoms.





All discharge instructions reviewed with patient and/or family. Voiced 

understanding.











LAURIE ECHEVERRIA                 2022 16:35

## 2022-04-25 ENCOUNTER — HOSPITAL ENCOUNTER (OUTPATIENT)
Dept: HOSPITAL 75 - RAD | Age: 67
End: 2022-04-25
Attending: INTERNAL MEDICINE
Payer: MEDICARE

## 2022-04-25 DIAGNOSIS — I15.9: Primary | ICD-10-CM

## 2022-04-25 PROCEDURE — 93975 VASCULAR STUDY: CPT

## 2022-04-25 NOTE — DIAGNOSTIC IMAGING REPORT
PROCEDURE: US DOPPLER ABD/COMPLETE



INDICATION:  SECONDARY HYPERTENSION



TECHNIQUE:

Multiple real-time grayscale sonographic images, color and duplex

Doppler images were obtained of the urinary system.



FINDINGS:

Examination is significantly compromised by patient motion

artifact overlying bowel gas.



Aortic velocity:  101 cm/sec.



RIGHT kidney:

Size: 11.3 x 5.4 x 4.6 cm 

The right renal parenchyma and collecting system are grossly

unremarkable. Low-density area centrally may be slightly

prominent collecting system.

The right renal artery is visualized in its proximal, mid and

distal aspect.  

Maximum renal artery velocity:  134cm/sec  

Maximum renal artery/aortic ratio:  1.33



LEFT kidney:

Size:  11.9 x 5.1 x 5.1 cm 

The left renal parenchyma and collecting system are grossly

unremarkable. Low-density area centrally may be slightly

prominent collecting system.

The left renal artery is visualized in its proximal, mid and

distal aspect.  

Maximum renal artery velocity:  190cm/sec

Maximum renal artery/aortic ratio:  1.88



Bladder:

Not imaged.



IMPRESSION:

1. Limited but grossly unremarkable appearing renal ultrasound

with doppler.







(RA/AO ratios > 3.0 may suggest potential hemodynamically

significant stenosis.)



Dictated by: 



  Dictated on workstation # [id number]

## 2022-06-09 ENCOUNTER — HOSPITAL ENCOUNTER (OUTPATIENT)
Dept: HOSPITAL 75 - RAD | Age: 67
End: 2022-06-09
Attending: FAMILY MEDICINE
Payer: MEDICARE

## 2022-06-09 DIAGNOSIS — R19.7: ICD-10-CM

## 2022-06-09 DIAGNOSIS — R91.8: Primary | ICD-10-CM

## 2022-06-09 DIAGNOSIS — N23: ICD-10-CM

## 2022-06-09 DIAGNOSIS — R06.00: ICD-10-CM

## 2022-06-09 DIAGNOSIS — R10.11: ICD-10-CM

## 2022-06-09 LAB
BUN/CREAT SERPL: 23
CREAT SERPL-MCNC: 1.15 MG/DL (ref 0.6–1.3)
GFR SERPLBLD BASED ON 1.73 SQ M-ARVRAT: 52 ML/MIN

## 2022-06-09 PROCEDURE — 71260 CT THORAX DX C+: CPT

## 2022-06-09 PROCEDURE — 74178 CT ABD&PLV WO CNTR FLWD CNTR: CPT

## 2022-06-09 PROCEDURE — 36415 COLL VENOUS BLD VENIPUNCTURE: CPT

## 2022-06-09 PROCEDURE — 82565 ASSAY OF CREATININE: CPT

## 2022-06-09 PROCEDURE — 84520 ASSAY OF UREA NITROGEN: CPT

## 2022-06-09 NOTE — DIAGNOSTIC IMAGING REPORT
EXAMINATION: CT chest with intravenous contrast, CT abdomen and

pelvis without and with intravenous contrast.



TECHNIQUE: Pre and post intravenous contrast axial imaging of the

abdomen and pelvis and post contrast axial imaging of the chest

were performed. All CT scans use one or more of the following

dose optimizing techniques: automated exposure control, MA and/or

KvP adjustment based on patient size and exam type or iterative

reconstruction. 



HISTORY: Pulmonary nodule, adrenal disorder.



COMPARISON: 4/12/2021 and 6/24/2021



FINDINGS: 



There is no edema or pneumonia. No pleural effusion. No

pneumothorax. There is a stable 1.1 x 1.4 cm right lower lobe

nodule. There is a stable fissural lymph node along the major

fissure. No new nodules are seen.



There is no axillary or supraclavicular lymphadenopathy. There is

no mediastinal lymphadenopathy. Heart size is normal. There are

no coronary artery calcifications.  No pericardial effusion.

Aorta is normal in caliber.



The liver is normal without focal lesion. There is no biliary

ductal dilation. Gallbladder is surgically absent. No Pancreas is

normal.  Spleen is normal. Adrenal glands are normal.



The kidneys are normal. There is no hydronephrosis. Urinary

bladder is normal.



Bowel is normal in caliber without obstruction or inflammation.

There has been rectosigmoid resection. No free fluid or air. No

abdominal or pelvic lymphadenopathy. Aorta is normal in caliber

without aneurysm.



There are no suspicious osseus lesions. There has been a left

sacroiliac fixation. There is a stable bone island in T12.



IMPRESSION:



1. Stable pulmonary nodules. No specific follow-up needed.



2. Normal adrenal glands.



Dictated by: 



  Dictated on workstation # [id number]

## 2023-02-15 ENCOUNTER — HOSPITAL ENCOUNTER (OUTPATIENT)
Dept: HOSPITAL 75 - CARD | Age: 68
End: 2023-02-15
Attending: FAMILY MEDICINE
Payer: MEDICARE

## 2023-02-15 DIAGNOSIS — I49.9: Primary | ICD-10-CM

## 2023-02-15 PROCEDURE — 93005 ELECTROCARDIOGRAM TRACING: CPT

## 2023-02-15 PROCEDURE — 93242 EXT ECG>48HR<7D RECORDING: CPT

## 2023-05-19 ENCOUNTER — HOSPITAL ENCOUNTER (EMERGENCY)
Dept: HOSPITAL 75 - ER | Age: 68
Discharge: HOME | End: 2023-05-19
Payer: MEDICARE

## 2023-05-19 VITALS — BODY MASS INDEX: 28.72 KG/M2 | WEIGHT: 182.98 LBS | HEIGHT: 66.93 IN

## 2023-05-19 VITALS — SYSTOLIC BLOOD PRESSURE: 140 MMHG | DIASTOLIC BLOOD PRESSURE: 81 MMHG

## 2023-05-19 DIAGNOSIS — Z90.49: ICD-10-CM

## 2023-05-19 DIAGNOSIS — R10.9: Primary | ICD-10-CM

## 2023-05-19 DIAGNOSIS — I70.0: ICD-10-CM

## 2023-05-19 DIAGNOSIS — Z87.19: ICD-10-CM

## 2023-05-19 DIAGNOSIS — E87.6: ICD-10-CM

## 2023-05-19 DIAGNOSIS — R11.0: ICD-10-CM

## 2023-05-19 LAB
ALBUMIN SERPL-MCNC: 4.8 GM/DL (ref 3.2–4.5)
ALP SERPL-CCNC: 57 U/L (ref 40–136)
ALT SERPL-CCNC: 24 U/L (ref 0–55)
AMYLASE SERPL-CCNC: 63 U/L (ref 25–125)
APTT PPP: YELLOW S
BACTERIA #/AREA URNS HPF: NEGATIVE /HPF
BASOPHILS # BLD AUTO: 0 10^3/UL (ref 0–0.1)
BASOPHILS NFR BLD AUTO: 1 % (ref 0–10)
BILIRUB SERPL-MCNC: 0.4 MG/DL (ref 0.1–1)
BILIRUB UR QL STRIP: NEGATIVE
BUN/CREAT SERPL: 16
CALCIUM SERPL-MCNC: 9.4 MG/DL (ref 8.5–10.1)
CHLORIDE SERPL-SCNC: 98 MMOL/L (ref 98–107)
CO2 SERPL-SCNC: 25 MMOL/L (ref 21–32)
CREAT SERPL-MCNC: 1.17 MG/DL (ref 0.6–1.3)
EOSINOPHIL # BLD AUTO: 0 10^3/UL (ref 0–0.3)
EOSINOPHIL NFR BLD AUTO: 1 % (ref 0–10)
FIBRINOGEN PPP-MCNC: CLEAR MG/DL
GFR SERPLBLD BASED ON 1.73 SQ M-ARVRAT: 51 ML/MIN
GLUCOSE SERPL-MCNC: 77 MG/DL (ref 70–105)
GLUCOSE UR STRIP-MCNC: (no result) MG/DL
HCT VFR BLD CALC: 44 % (ref 35–52)
HGB BLD-MCNC: 15.1 G/DL (ref 11.5–16)
KETONES UR QL STRIP: NEGATIVE
LEUKOCYTE ESTERASE UR QL STRIP: NEGATIVE
LIPASE SERPL-CCNC: 86 U/L (ref 8–78)
LYMPHOCYTES # BLD AUTO: 2.7 10^3/UL (ref 1–4)
LYMPHOCYTES NFR BLD AUTO: 30 % (ref 12–44)
MANUAL DIFFERENTIAL PERFORMED BLD QL: NO
MCH RBC QN AUTO: 32 PG (ref 25–34)
MCHC RBC AUTO-ENTMCNC: 34 G/DL (ref 32–36)
MCV RBC AUTO: 92 FL (ref 80–99)
MONOCYTES # BLD AUTO: 0.6 10^3/UL (ref 0–1)
MONOCYTES NFR BLD AUTO: 7 % (ref 0–12)
NEUTROPHILS # BLD AUTO: 5.4 10^3/UL (ref 1.8–7.8)
NEUTROPHILS NFR BLD AUTO: 61 % (ref 42–75)
NITRITE UR QL STRIP: NEGATIVE
PH UR STRIP: 6 [PH] (ref 5–9)
PLATELET # BLD: 302 10^3/UL (ref 130–400)
PMV BLD AUTO: 9.7 FL (ref 9–12.2)
POTASSIUM SERPL-SCNC: 3.2 MMOL/L (ref 3.6–5)
PROT SERPL-MCNC: 8.5 GM/DL (ref 6.4–8.2)
PROT UR QL STRIP: NEGATIVE
RBC #/AREA URNS HPF: (no result) /HPF
SODIUM SERPL-SCNC: 136 MMOL/L (ref 135–145)
SP GR UR STRIP: <=1.005 (ref 1.02–1.02)
SQUAMOUS #/AREA URNS HPF: (no result) /HPF
WBC # BLD AUTO: 8.8 10^3/UL (ref 4.3–11)
WBC #/AREA URNS HPF: (no result) /HPF

## 2023-05-19 PROCEDURE — 74175 CTA ABDOMEN W/CONTRAST: CPT

## 2023-05-19 PROCEDURE — 71275 CT ANGIOGRAPHY CHEST: CPT

## 2023-05-19 PROCEDURE — 36415 COLL VENOUS BLD VENIPUNCTURE: CPT

## 2023-05-19 PROCEDURE — 85025 COMPLETE CBC W/AUTO DIFF WBC: CPT

## 2023-05-19 PROCEDURE — 81000 URINALYSIS NONAUTO W/SCOPE: CPT

## 2023-05-19 PROCEDURE — 82150 ASSAY OF AMYLASE: CPT

## 2023-05-19 PROCEDURE — 83690 ASSAY OF LIPASE: CPT

## 2023-05-19 PROCEDURE — 80053 COMPREHEN METABOLIC PANEL: CPT

## 2023-05-19 NOTE — ED ABDOMINAL PAIN
General


Chief Complaint:  Abdominal/GI Problems


Stated Complaint:  LEFT SIDE PAIN


Nursing Triage Note:  


PT AMB TO TRIAGE, PT CO OF L SIDED ABD PAIN STARTED APPROX 1 WEEK AGO. RATES 


PAIN 7/10 WORSE AT TIMES. PT CO OF NAUSEA. PT WAS SEEN AT [facility] AND HAD CT SCAN 


TODAY


Source of Information:  Patient


Exam Limitations:  No Limitations





History of Present Illness


Date Seen by Provider:  May 19, 2023


Time Seen by Provider:  16:01


Initial Comments


68-year-old female presents to the ER with complaints of left flank pain for the

past week.  She states the pain started out mild, and then became severe this 

Wednesday and Thursday.  She reports pain is constant, but states at times it 

becomes more intense.  She reports pain when taking deep breath.  She was seen 

at the [facility] clinic this morning, they did a noncontrast CT which only showed a 

small accessory spleen.  Negative for kidney stones.  [facility] also performed a 

urinalysis which only showed 3+ glucose, negative for RBC.  She reports she had 

a low-grade temperature of 99.8 last night.  Denies fevers today.  Denies chest 

pain, shortness of air, abdominal pain, though she does report pain sometimes 

radiates into abdomen.





Allergies and Home Medications


Allergies


Coded Allergies:  


     Penicillins (Verified  Allergy, Unknown, 14)


     clarithromycin (Verified  Allergy, Unknown, 18)


   Patient as tolerated azithromycin


     gluten (Verified  Allergy, Unknown, 14)


     metoclopramide (Unverified  Allergy, Unknown, 3/30/16)


     piroxicam (Verified  Allergy, Unknown, Pt has received Ketorolac w/o issue,

18)


     sulfacetamide sodium (Verified  Allergy, Unknown, 14)





Patient Home Medication List


Home Medication List Reviewed:  Yes


Bisoprolol Fumarate (Bisoprolol Fumarate) 10 Mg Tablet, 10 MG PO BID, (Reported)


   Entered as Reported by: ALEX BEJARANO on 18 1531


Cefdinir (Cefdinir) 300 Mg Capsule, 300 MG PO BID


   Prescribed by: TIA MOURA on 21 1246


Cholecalciferol (Vitamin D3) (Vitamin D3) 125 Mcg Capsule, 125 MCG PO HS, 

(Reported)


   Entered as Reported by: DIPAK PHILLIPS on 21 1437


Cyanocobalamin (Cyanocobalamin Injection) 1,000 Mcg/Ml Inj, 1,000 MCG IM EVERY 2

MONTHS, (Reported)


   Entered as Reported by: DIPAK PHILLIPS on 21 143


Duloxetine HCl (Duloxetine HCl) 60 Mg Capsule.dr, 60 MG PO HS, (Reported)


   Entered as Reported by: ALEX BEJARANO on 18 1518


Empagliflozin/Linagliptin (Glyxambi 25 mg-5 mg Tablet) 1 Each Tablet, 1 EA PO 

DAILY, (Reported)


   Entered as Reported by: DIPAK PHILLIPS on 21 143


Ergocalciferol (Vitamin D2) (Vitamin D2) 1,250 Mcg Capsule, 1,250 MCG PO SAT, 

(Reported)


   Entered as Reported by: DIPAK PHILLIPS on 21 143


Estradiol (Estradiol Patch Twice Weekly 0.1mg/hr) 1 Each Patch.tdsw, 1 PATCH TD 

TWICE WEEKLY, (Reported)


   Entered as Reported by: DIPAK PHILLIPS on 21 143


Fexofenadine HCl (Allegra Allergy) 180 Mg Tablet, 180 MG PO DAILY, (Reported)


   Entered as Reported by: DIPAK PHILLIPS on 21 143


Glipizide (Glipizide) 5 Mg Tablet, 5 MG PO DAILY, (Reported)


   Entered as Reported by: DIPAK PHILLIPS on 21 143


Hydrocodone/Acetaminophen (Hydrocodone-Acetamin 5-325 mg) 1 Each Tablet, 1-2 TAB

PO Q8H PRN for PAIN-MODERATE (5-7), (Reported)


   Entered as Reported by: DIPAK PHILLIPS on 21 143


Levothyroxine Sodium (Tirosint) 88 Mcg Capsule, 88 MCG PO DAILY, (Reported)


   Entered as Reported by: DIPAK PHILLIPS on 21 143


Liothyronine Sodium (Liothyronine Sodium) 5 Mcg Tablet, 5 MCG PO DAILY, 

(Reported)


   Entered as Reported by: ALEX BEJARANO on 18 1531


Metronidazole (Flagyl) 500 Mg Tablet, 500 MG PO TID


   Prescribed by: TIA MOURA on 21 1246


Pantoprazole Sodium (Pantoprazole Sodium) 40 Mg Tablet.dr, 40 MG PO HS, 

(Reported)


   Entered as Reported by: ALEX BEJARANO on 18 1518


Polyethylene Glycol 3350 (Miralax) 17 Gm Powd.pack, 17 GM PO BID, (Reported)


   Entered as Reported by: DIPAK PHILLIPS on 21 1437


Valsartan (Valsartan) 320 Mg Tablet, 320 MG PO DAILY, (Reported)


   Entered as Reported by: DIPAK PHILLIPS on 21 1437





Review of Systems


Review of Systems


Constitutional:  see HPI





Past Medical-Social-Family Hx


Patient Social History


Tobacco Use?:  No


Substance use?:  No


Alcohol Use?:  No


Pt feels they are or have been:  No





Immunizations Up To Date


Tetanus Booster (TDap):  Less than 5yrs


PED Vaccines UTD:  Yes


Influenza Vaccine Up-to-Date:  Yes; Up-to-Date


First/Initial COVID19 Vaccinat:  YES


Second COVID19 Vaccination Mike:  YES


Third COVID19 Vaccination Date:  YES





Seasonal Allergies


Seasonal Allergies:  No





Past Medical History


Surgery/Hospitalization HX:  


T AND A , REFLUX, GB, NECK AND BACK SURG, COLON RESECTION, TUMOR REMOVED FROM L 


KIDNEY


Surgeries:  Yes


Appendectomy, Breast, Cardiac, Gallbladder, Hysterectomy, Oophorectomy, 

Orthopedic, Thyroidectomy, Tonsillectomy


Respiratory:  Yes


Asthma


Currently Using CPAP:  No


Currently Using BIPAP:  No


Cardiac:  Yes (mitral valve prolapse, heart caths no stents)


Coronary Artery Disease, Heart Attack, Valvular Heart Disease


Neurological:  Yes


Reproductive Disorders:  Yes (TOTAL HYSTERECTOMY)


Female Reproductive Disorders:  Menstrual Problems, Endometriosis, Ovarian Cyst


GYN History:  Hysterectomy


Sexually Transmitted Disease:  No


HIV/AIDS:  No


Kidney Infection, Kidney Stones, UTI-Chronic


Gastrointestinal:  Yes (CHRONIC ABDOMINAL PAIN )


Gastroesophageal Reflux, Crohns Disease, Diverticulosis, Polyps, Hiatal Hernia, 

Ulcer, Gall Bladder Disease, Irritable Bowel


Musculoskeletal:  Yes (ANKLYLOSING SPONDYLITIS)


Degenerate Disk Disease, Arthritis, Chronic Back Pain


Endocrine:  Yes


Hypothyroidsim, Lupus


Loss of Vision:  Denies


Hearing Impairment:  Denies


Cancer:  No


Psychosocial:  No


Integumentary:  Yes (STAPH INFECTIONS)


Blood Disorders:  No


Adverse Reaction/Blood Tranf:  No





Family Medical History





Family history: Cardiovascular disease


Family history: Diabetes mellitus


  03 MOTHER


  09 BROTHER


Family history: Glaucoma


  03 MOTHER


Family history: Thyroid disorder


  03 MOTHER


Heart disease


  03 FATHER (AORTIC VALVE REPLACED)


No Pertinent Family Hx





Physical Exam


Vital Signs





Vital Signs - First Documented








 23





 15:40 18:38


 


Temp 36.5 


 


Pulse 61 


 


Resp 18 


 


B/P (MAP) 157/70 (99) 


 


Pulse Ox 99 


 


O2 Delivery  Room Air





Capillary Refill : Less Than 3 Seconds


Height/Weight/BMI


Height: 5'6.00"


Weight: 182lbs. 0.0oz. 82.629323ua; 28.00 BMI


Method:Stated


General Appearance:  WD/WN, mild distress


Neck:  supple, normal inspection


Respiratory:  lungs clear, normal breath sounds, no respiratory distress, no 

accessory muscle use


Cardiovascular:  regular rate, rhythm


Gastrointestinal:  normal bowel sounds, non tender, soft


Extremities:  normal range of motion, normal inspection


Back:  no vertebral tenderness, other (Tenderness on left side)


Neurologic/Psychiatric:  alert, normal mood/affect


Skin:  normal color, warm/dry





Progress/Results/Core Measures


Results/Orders


Lab Results





Laboratory Tests








Test


 23


06:01 23


16:27 Range/Units


 


 


White Blood Count


 8.8 


 


 4.3-11.0


10^3/uL


 


Red Blood Count


 4.75 


 


 3.80-5.11


10^6/uL


 


Hemoglobin 15.1   11.5-16.0  g/dL


 


Hematocrit 44   35-52  %


 


Mean Corpuscular Volume 92   80-99  fL


 


Mean Corpuscular Hemoglobin 32   25-34  pg


 


Mean Corpuscular Hemoglobin


Concent 34 


 


 32-36  g/dL





 


Red Cell Distribution Width 13.6   10.0-14.5  %


 


Platelet Count


 302 


 


 130-400


10^3/uL


 


Mean Platelet Volume 9.7   9.0-12.2  fL


 


Immature Granulocyte % (Auto) 1    %


 


Neutrophils (%) (Auto) 61   42-75  %


 


Lymphocytes (%) (Auto) 30   12-44  %


 


Monocytes (%) (Auto) 7   0-12  %


 


Eosinophils (%) (Auto) 1   0-10  %


 


Basophils (%) (Auto) 1   0-10  %


 


Neutrophils # (Auto)


 5.4 


 


 1.8-7.8


10^3/uL


 


Lymphocytes # (Auto)


 2.7 


 


 1.0-4.0


10^3/uL


 


Monocytes # (Auto)


 0.6 


 


 0.0-1.0


10^3/uL


 


Eosinophils # (Auto)


 0.0 


 


 0.0-0.3


10^3/uL


 


Basophils # (Auto)


 0.0 


 


 0.0-0.1


10^3/uL


 


Immature Granulocyte # (Auto)


 0.0 


 


 0.0-0.1


10^3/uL


 


Sodium Level 136   135-145  MMOL/L


 


Potassium Level 3.2 L  3.6-5.0  MMOL/L


 


Chloride Level 98     MMOL/L


 


Carbon Dioxide Level 25   21-32  MMOL/L


 


Anion Gap 13   5-14  MMOL/L


 


Blood Urea Nitrogen 19 H  7-18  MG/DL


 


Creatinine


 1.17 


 


 0.60-1.30


MG/DL


 


Estimat Glomerular Filtration


Rate 51 


 


  





 


BUN/Creatinine Ratio 16    


 


Glucose Level 77     MG/DL


 


Calcium Level 9.4   8.5-10.1  MG/DL


 


Corrected Calcium    8.5-10.1  MG/DL


 


Total Bilirubin 0.4   0.1-1.0  MG/DL


 


Aspartate Amino Transf


(AST/SGOT) 19 


 


 5-34  U/L





 


Alanine Aminotransferase


(ALT/SGPT) 24 


 


 0-55  U/L





 


Alkaline Phosphatase 57     U/L


 


Total Protein 8.5 H  6.4-8.2  GM/DL


 


Albumin 4.8 H  3.2-4.5  GM/DL


 


Amylase Level 63     U/L


 


Lipase 86 H  8-78  U/L


 


Urine Color  YELLOW   


 


Urine Clarity  CLEAR   


 


Urine pH  6.0  5-9  


 


Urine Specific Gravity  <=1.005  1.016-1.022  


 


Urine Protein  NEGATIVE  NEGATIVE  


 


Urine Glucose (UA)  1+ H NEGATIVE  


 


Urine Ketones  NEGATIVE  NEGATIVE  


 


Urine Nitrite  NEGATIVE  NEGATIVE  


 


Urine Bilirubin  NEGATIVE  NEGATIVE  


 


Urine Urobilinogen  0.2  < = 1.0  MG/DL


 


Urine Leukocyte Esterase  NEGATIVE  NEGATIVE  


 


Urine RBC (Auto)  NEGATIVE  NEGATIVE  


 


Urine RBC  NONE   /HPF


 


Urine WBC  NONE   /HPF


 


Urine Squamous Epithelial


Cells 


 0-2 


  /HPF





 


Urine Crystals  NONE   /LPF


 


Urine Bacteria  NEGATIVE   /HPF


 


Urine Casts  NONE   /LPF


 


Urine Mucus  NEGATIVE   /LPF


 


Urine Culture Indicated  NO   








My Orders





Orders - REHANA STARKEY


Ed Iv/Invasive Line Start (23 16:01)


Comprehensive Metabolic Panel (23 16:08)


Lipase (23 16:08)


Amylase (23 16:08)


Cbc With Automated Diff (23 16:08)


Ua Culture If Indicated (23 16:08)


Ct Angio Chest/Abd W(R/O Ad) (23 16:08)


Ketorolac Injection (Toradol Injection) (23 16:15)


Ondansetron Injection (Zofran Injectio (23 16:15)


Iohexol Injection (Omnipaque 350 Mg/Ml 1 (23 16:30)


Received Contrast (Hold Metformin- Contr (23 16:30)


Ns (Ivpb) (Sodium Chloride 0.9% Ivpb Bag (23 16:30)


Ns Iv 1000 Ml (Sodium Chloride 0.9%) (23 17:00)


Ns Iv 1000 Ml (Sodium Chloride 0.9%) (23 17:45)


Potassium Chloride (Tablet) (K Dur Table (23 17:45)





Medications Given in ED





Vital Signs/I&O











 23





 15:40 18:38


 


Temp 36.5 36.1


 


Pulse 61 77


 


Resp 18 18


 


B/P (MAP) 157/70 (99) 140/81


 


Pulse Ox 99 100


 


O2 Delivery  Room Air














 23





 00:00


 


Intake Total 2000 ml


 


Balance 2000 ml














Blood Pressure Mean:                    99











Progress


Progress Note :  


Progress Note


Patient seen and evaluated, resting in bed, mild distress.  Based on exam and 

symptoms, work-up initiated including CBC, CMP, amylase, lipase, UA, CT angio 

chest and abdomen to rule out dissection.  Toradol and Zofran ordered.  Patient 

requesting not to have narcotic pain medicine so that she can drive home.





Labs and CT reviewed. .CBC grossly normal, CMP shows decreased potassium 3.2, 

oral potassium ordered. BUN slightly elevated at 19, creatinine 1.17, GFR 51. 

Patient has chronic kidney disease, creatinine and GFR are similar to previous 

labs from 2022. Lipase slightly elevated 86. Albumin slightly elevated 4.8. 2 

liters of fluids ordered due to giving contrast. CT shows no acute vascular 

abnormality. Mild diffuse calcified and noncalcified aortic atherosclerotic 

disease, no evidence of dissection or aneurysm. Probable hemodynamically 

significant stenosis of bilateral renal arteries. Hepatic stenosis. Right-sided 

pulmonary nodules that are stable from previous exams. Results discussed with 

patient. Patient reports she feels better after toradol. Patient agreeable to 

discharge at this time. Patient given strict return precautions.





Diagnostic Imaging





   Diagonstic Imaging:  CT


   Plain Films/CT/US/NM/MRI:  chest, abdomen


Comments


                 [facility]


                                [place], [place]





NAME:   DOMINGO CONRAD


MED REC#:   [id number]


ACCOUNT#:   [id number]


PT STATUS:   REG ER


:   1955


PHYSICIAN:   REHANA STARKEY APRN


ADMIT DATE:   23/ER


                                  ***Signed***


Date of Exam:23





CT ANGIO CHEST/ABD W(R/O AD)








INDICATION: Left-sided abdominal pain. Nausea.





COMPARISON: 2022.





TECHNIQUE: Noncontrast CT of the chest and abdomen was performed.


This was followed by postcontrast CTA. Contrast bolus was


injected intravenously and timed for optimal opacification of the


arterial structures. Multiplanar and 3D reformats were also


created and reviewed. Auto Exposure Controls were utilized during


the CT exam to meet ALARA standards for radiation dose reduction.





FINDINGS:





CTA CHEST: Thoracic aorta is normal in course and caliber. There


is mild scattered noncalcified atherosclerotic disease, but by


NASCET criteria, there is no focal significant stenosis. There is


no evidence of dissection or aneurysm.





Heart size is within normal limits. There is no large pericardial


effusion. Main pulmonary arterial trunk is slightly prominent at


3.3 cm in diameter. No pathologically enlarged or morphologically


abnormal adenopathy is seen within the mediastinum, zi, nor


axilla.





Evaluation of the lung fields demonstrates mild dependent


atelectasis. Pulmonary nodule of the superior segment of the


right lower lobe is again identified. It measures 1.4 x 1.3 cm on


today's exam. This is stable compared to 1.4 x 1.4 cm previously.


Intrafissural nodule is also identified slightly more inferiorly


and anteriorly and is stable as well. No new suspicious pulmonary


nodule or mass is seen. There is no focal consolidation, large


effusion, nor pneumothorax.





Osseous structures show no acute abnormalities. No lytic or


blastic bony lesions are identified.





CTA ABDOMEN: There is mild scattered calcified and noncalcified


aortic atherosclerosis, but no focal significant stenosis nor


evidence of dissection or aneurysm. Bulky calcifications are


noted at the origin of the celiac trunk, but there is no focal


significant stenosis. Celiac trunk and superior mesenteric artery


and their major branches are widely patent.





There are two renal arteries on the right and a single renal


artery on the left. There is noncalcified atherosclerotic disease


of the origins of the bilateral main renal arteries. This results


in 40 to 50% stenosis of the left renal artery and probable


greater than 80% stenosis of the dominant renal artery on the


right.





Liver is diffusely hypodense on the noncontrast portion of the


exam consistent with hepatic steatosis. No suspicious hepatic


masses are seen. The kidneys, adrenal glands, spleen, and


pancreas have a normal CTA appearance. Included small bowel loops


are nondilated. Moderate air and stool is noted within the


included portions of the colon. Cecum is not entirely included in


the field-of-view, nor is the appendix.





There is no loculated fluid collection, free fluid, or free air.


No abnormal adenopathy is seen. Osseous structures show no acute


abnormalities.





IMPRESSION:


1. No acute vascular abnormality of the chest or abdomen.


2. Mild diffuse calcified and noncalcified aortic atherosclerotic


disease, but no evidence of dissection nor aneurysm


3. Probable hemodynamically significant stenosis of the bilateral


renal arteries, right greater than left. Correlation with history


of hypertension is advised.


4. Hepatic steatosis.


5. Right-sided pulmonary nodules. Again, these are stable


compared to 2022 and have been shown to be stable and


benign on prior exams.





Dictated by: 





  Dictated on workstation # [id number]








Dict:   23 1652


Trans:   23


[id number]





Interpreted by:     CHA BILLY MD


Electronically signed by: CHA BILLY MD 23





Departure


Impression





   Primary Impression:  


   Left flank pain


Disposition:  01 HOME, SELF-CARE


Condition:  Stable





Departure-Patient Inst.


Decision time for Depature:  18:33


Referrals:  


TIA MOURA DO (PCP/Family)


Primary Care Physician


Patient Instructions:  Flank Pain





Add. Discharge Instructions:  


Follow-up with your primary care provider.


Follow-up with cardiology regarding the renal artery stenosis, they may perform 

a renal artery stent.


Take your pain medication as prescribed.


Return if pain continues or gets worse, recurrent vomiting, develop a fever or 

rash, or any other new, concerning, or worsening symptoms.





All discharge instructions reviewed with patient and/or family. Voiced 

understanding.











REHANA STARKEY        May 19, 2023 16:16

## 2023-05-19 NOTE — DIAGNOSTIC IMAGING REPORT
INDICATION: Left-sided abdominal pain. Nausea.



COMPARISON: 06/09/2022.



TECHNIQUE: Noncontrast CT of the chest and abdomen was performed.

This was followed by postcontrast CTA. Contrast bolus was

injected intravenously and timed for optimal opacification of the

arterial structures. Multiplanar and 3D reformats were also

created and reviewed. Auto Exposure Controls were utilized during

the CT exam to meet ALARA standards for radiation dose reduction.



FINDINGS:



CTA CHEST: Thoracic aorta is normal in course and caliber. There

is mild scattered noncalcified atherosclerotic disease, but by

NASCET criteria, there is no focal significant stenosis. There is

no evidence of dissection or aneurysm.



Heart size is within normal limits. There is no large pericardial

effusion. Main pulmonary arterial trunk is slightly prominent at

3.3 cm in diameter. No pathologically enlarged or morphologically

abnormal adenopathy is seen within the mediastinum, zi, nor

axilla.



Evaluation of the lung fields demonstrates mild dependent

atelectasis. Pulmonary nodule of the superior segment of the

right lower lobe is again identified. It measures 1.4 x 1.3 cm on

today's exam. This is stable compared to 1.4 x 1.4 cm previously.

Intrafissural nodule is also identified slightly more inferiorly

and anteriorly and is stable as well. No new suspicious pulmonary

nodule or mass is seen. There is no focal consolidation, large

effusion, nor pneumothorax.



Osseous structures show no acute abnormalities. No lytic or

blastic bony lesions are identified.



CTA ABDOMEN: There is mild scattered calcified and noncalcified

aortic atherosclerosis, but no focal significant stenosis nor

evidence of dissection or aneurysm. Bulky calcifications are

noted at the origin of the celiac trunk, but there is no focal

significant stenosis. Celiac trunk and superior mesenteric artery

and their major branches are widely patent.



There are two renal arteries on the right and a single renal

artery on the left. There is noncalcified atherosclerotic disease

of the origins of the bilateral main renal arteries. This results

in 40 to 50% stenosis of the left renal artery and probable

greater than 80% stenosis of the dominant renal artery on the

right.



Liver is diffusely hypodense on the noncontrast portion of the

exam consistent with hepatic steatosis. No suspicious hepatic

masses are seen. The kidneys, adrenal glands, spleen, and

pancreas have a normal CTA appearance. Included small bowel loops

are nondilated. Moderate air and stool is noted within the

included portions of the colon. Cecum is not entirely included in

the field-of-view, nor is the appendix.



There is no loculated fluid collection, free fluid, or free air.

No abnormal adenopathy is seen. Osseous structures show no acute

abnormalities.



IMPRESSION:

1. No acute vascular abnormality of the chest or abdomen.

2. Mild diffuse calcified and noncalcified aortic atherosclerotic

disease, but no evidence of dissection nor aneurysm

3. Probable hemodynamically significant stenosis of the bilateral

renal arteries, right greater than left. Correlation with history

of hypertension is advised.

4. Hepatic steatosis.

5. Right-sided pulmonary nodules. Again, these are stable

compared to 06/09/2022 and have been shown to be stable and

benign on prior exams.



Dictated by: 



  Dictated on workstation # [id number]

## 2023-06-03 ENCOUNTER — HOSPITAL ENCOUNTER (EMERGENCY)
Dept: HOSPITAL 75 - ER | Age: 68
Discharge: HOME | End: 2023-06-03
Payer: MEDICARE

## 2023-06-03 VITALS — DIASTOLIC BLOOD PRESSURE: 63 MMHG | SYSTOLIC BLOOD PRESSURE: 126 MMHG

## 2023-06-03 DIAGNOSIS — Z90.49: ICD-10-CM

## 2023-06-03 DIAGNOSIS — Z88.1: ICD-10-CM

## 2023-06-03 DIAGNOSIS — R07.81: ICD-10-CM

## 2023-06-03 DIAGNOSIS — Z88.2: ICD-10-CM

## 2023-06-03 DIAGNOSIS — Z95.828: ICD-10-CM

## 2023-06-03 DIAGNOSIS — Z88.0: ICD-10-CM

## 2023-06-03 DIAGNOSIS — N39.0: Primary | ICD-10-CM

## 2023-06-03 DIAGNOSIS — Z87.19: ICD-10-CM

## 2023-06-03 LAB
ALBUMIN SERPL-MCNC: 4.3 GM/DL (ref 3.2–4.5)
ALP SERPL-CCNC: 51 U/L (ref 40–136)
ALT SERPL-CCNC: 22 U/L (ref 0–55)
APTT PPP: YELLOW S
BACTERIA #/AREA URNS HPF: (no result) /HPF
BASOPHILS # BLD AUTO: 0 10^3/UL (ref 0–0.1)
BASOPHILS NFR BLD AUTO: 1 % (ref 0–10)
BILIRUB SERPL-MCNC: 0.4 MG/DL (ref 0.1–1)
BILIRUB UR QL STRIP: NEGATIVE
BUN/CREAT SERPL: 15
CALCIUM SERPL-MCNC: 9.7 MG/DL (ref 8.5–10.1)
CHLORIDE SERPL-SCNC: 99 MMOL/L (ref 98–107)
CO2 SERPL-SCNC: 25 MMOL/L (ref 21–32)
CREAT SERPL-MCNC: 1.24 MG/DL (ref 0.6–1.3)
EOSINOPHIL # BLD AUTO: 0 10^3/UL (ref 0–0.3)
EOSINOPHIL NFR BLD AUTO: 0 % (ref 0–10)
FIBRINOGEN PPP-MCNC: CLEAR MG/DL
GFR SERPLBLD BASED ON 1.73 SQ M-ARVRAT: 47 ML/MIN
GLUCOSE SERPL-MCNC: 199 MG/DL (ref 70–105)
GLUCOSE UR STRIP-MCNC: (no result) MG/DL
HCT VFR BLD CALC: 44 % (ref 35–52)
HGB BLD-MCNC: 14.9 G/DL (ref 11.5–16)
KETONES UR QL STRIP: NEGATIVE
LEUKOCYTE ESTERASE UR QL STRIP: NEGATIVE
LYMPHOCYTES # BLD AUTO: 1.6 10^3/UL (ref 1–4)
LYMPHOCYTES NFR BLD AUTO: 21 % (ref 12–44)
MANUAL DIFFERENTIAL PERFORMED BLD QL: NO
MCH RBC QN AUTO: 31 PG (ref 25–34)
MCHC RBC AUTO-ENTMCNC: 34 G/DL (ref 32–36)
MCV RBC AUTO: 93 FL (ref 80–99)
MONOCYTES # BLD AUTO: 0.6 10^3/UL (ref 0–1)
MONOCYTES NFR BLD AUTO: 8 % (ref 0–12)
NEUTROPHILS # BLD AUTO: 5.4 10^3/UL (ref 1.8–7.8)
NEUTROPHILS NFR BLD AUTO: 70 % (ref 42–75)
NITRITE UR QL STRIP: NEGATIVE
PH UR STRIP: 5.5 [PH] (ref 5–9)
PLATELET # BLD: 288 10^3/UL (ref 130–400)
PMV BLD AUTO: 9.7 FL (ref 9–12.2)
POTASSIUM SERPL-SCNC: 3.9 MMOL/L (ref 3.6–5)
PROT SERPL-MCNC: 7.6 GM/DL (ref 6.4–8.2)
PROT UR QL STRIP: NEGATIVE
RBC #/AREA URNS HPF: (no result) /HPF
SODIUM SERPL-SCNC: 134 MMOL/L (ref 135–145)
SP GR UR STRIP: 1.01 (ref 1.02–1.02)
SQUAMOUS #/AREA URNS HPF: (no result) /HPF
WBC # BLD AUTO: 7.7 10^3/UL (ref 4.3–11)
WBC #/AREA URNS HPF: (no result) /HPF

## 2023-06-03 PROCEDURE — 85025 COMPLETE CBC W/AUTO DIFF WBC: CPT

## 2023-06-03 PROCEDURE — 71275 CT ANGIOGRAPHY CHEST: CPT

## 2023-06-03 PROCEDURE — 81000 URINALYSIS NONAUTO W/SCOPE: CPT

## 2023-06-03 PROCEDURE — 71046 X-RAY EXAM CHEST 2 VIEWS: CPT

## 2023-06-03 PROCEDURE — 74177 CT ABD & PELVIS W/CONTRAST: CPT

## 2023-06-03 PROCEDURE — 80053 COMPREHEN METABOLIC PANEL: CPT

## 2023-06-03 PROCEDURE — 36415 COLL VENOUS BLD VENIPUNCTURE: CPT

## 2023-06-03 PROCEDURE — 85379 FIBRIN DEGRADATION QUANT: CPT

## 2023-06-03 PROCEDURE — 86141 C-REACTIVE PROTEIN HS: CPT

## 2023-06-03 PROCEDURE — 87088 URINE BACTERIA CULTURE: CPT

## 2023-06-03 NOTE — DIAGNOSTIC IMAGING REPORT
INDICATION:  

Right-sided pleuritic chest pain with inspiration. Recent

placement of stents in the kidneys.  



TECHNIQUE:  

Two view chest at 9:33 AM.



CORRELATION STUDY:   

04/20/2022.



FINDINGS: 

The heart size, mediastinal configuration, and pulmonary

vasculature are within normal limits. The lungs are clear with no

consolidating infiltrate. There is no significant pleural

effusion or pneumothorax. Questioned small linear metallic

density over the base of the neck on the frontal projection.

Multiple surgical clips in the upper abdomen.



IMPRESSION: 

Negative for acute abnormality of the chest.



Dictated by: 



  Dictated on workstation # [id number]

## 2023-06-03 NOTE — ED GENERAL
General


Chief Complaint:  Back Problems


Stated Complaint:  RIGHT SIDED LOW BACK/ABD PAIN PTOP


Nursing Triage Note:  


PT AMB TO RM 8 WITH C/O R FLANK PAIN AFTER RECENT BILAT RENAL ARTERY STENTS AT 


[facility]. PT SEEN AT [facility] THIS AM AND SENT HERE FOR FURTHER TESTING


Source of Information:  Patient


Exam Limitations:  No Limitations





History of Present Illness


Date Seen by Provider:  Johnny 3, 2023


Time Seen by Provider:  08:54





Allergies and Home Medications


Allergies


Coded Allergies:  


     Penicillins (Verified  Allergy, Unknown, 14)


     clarithromycin (Verified  Allergy, Unknown, 18)


   Patient as tolerated azithromycin


     gluten (Verified  Allergy, Unknown, 14)


     metoclopramide (Unverified  Allergy, Unknown, 3/30/16)


     piroxicam (Verified  Allergy, Unknown, Pt has received Ketorolac w/o issue,

18)


     sulfacetamide sodium (Verified  Allergy, Unknown, 14)





Patient Home Medication List


Bisoprolol Fumarate (Bisoprolol Fumarate) 10 Mg Tablet, 10 MG PO BID, (Reported)


   Entered as Reported by: ALEX BEJARANO on 18 1531


Cefdinir (Cefdinir) 300 Mg Capsule, 300 MG PO BID


   Prescribed by: TIA MOURA on 21 1246


Cholecalciferol (Vitamin D3) (Vitamin D3) 125 Mcg Capsule, 125 MCG PO HS, 

(Reported)


   Entered as Reported by: DIPAK PHILLIPS on 21 1437


Cyanocobalamin (Cyanocobalamin Injection) 1,000 Mcg/Ml Inj, 1,000 MCG IM EVERY 2

MONTHS, (Reported)


   Entered as Reported by: DIPAK PHILLIPS on 21 1437


Duloxetine HCl (Duloxetine HCl) 60 Mg Capsule.dr, 60 MG PO HS, (Reported)


   Entered as Reported by: ALEX BEJARANO on 18 1518


Empagliflozin/Linagliptin (Glyxambi 25 mg-5 mg Tablet) 1 Each Tablet, 1 EA PO 

DAILY, (Reported)


   Entered as Reported by: DIPAK PHILLIPS on 21 1437


Ergocalciferol (Vitamin D2) (Vitamin D2) 1,250 Mcg Capsule, 1,250 MCG PO SAT, 

(Reported)


   Entered as Reported by: DIPAK PHILLIPS on 21 1437


Estradiol (Estradiol Patch Twice Weekly 0.1mg/hr) 1 Each Patch.tdsw, 1 PATCH TD 

TWICE WEEKLY, (Reported)


   Entered as Reported by: DIPAK PHILLIPS on 21 143


Fexofenadine HCl (Allegra Allergy) 180 Mg Tablet, 180 MG PO DAILY, (Reported)


   Entered as Reported by: DIPAK PHILLIPS on 21 143


Glipizide (Glipizide) 5 Mg Tablet, 5 MG PO DAILY, (Reported)


   Entered as Reported by: DIPAK PHILLIPS on 21 143


Hydrocodone/Acetaminophen (Hydrocodone-Acetamin 5-325 mg) 1 Each Tablet, 1-2 TAB

PO Q8H PRN for PAIN-MODERATE (5-7), (Reported)


   Entered as Reported by: DIPAK PHILLIPS on 21 143


Levothyroxine Sodium (Tirosint) 88 Mcg Capsule, 88 MCG PO DAILY, (Reported)


   Entered as Reported by: DIPAK PHILLIPS on 21 143


Liothyronine Sodium (Liothyronine Sodium) 5 Mcg Tablet, 5 MCG PO DAILY, 

(Reported)


   Entered as Reported by: ALEX BEJARANO on 18 1531


Metronidazole (Flagyl) 500 Mg Tablet, 500 MG PO TID


   Prescribed by: TIA MOURA on 21 1246


Pantoprazole Sodium (Pantoprazole Sodium) 40 Mg Tablet.dr, 40 MG PO HS, 

(Reported)


   Entered as Reported by: ALEX BEJARANO on 18 1518


Polyethylene Glycol 3350 (Miralax) 17 Gm Powd.pack, 17 GM PO BID, (Reported)


   Entered as Reported by: DIPAK PHILLIPS on 21 143


Valsartan (Valsartan) 320 Mg Tablet, 320 MG PO DAILY, (Reported)


   Entered as Reported by: DIPAK PHILLIPS on 21 143





Past Medical-Social-Family Hx


Patient Social History


Tobacco Use?:  No


Use of E-Cig and/or Vaping dev:  No


Substance use?:  No


Alcohol Use?:  No


Pt feels they are or have been:  No





Immunizations Up To Date


Tetanus Booster (TDap):  Less than 5yrs


PED Vaccines UTD:  Yes


First/Initial COVID19 Vaccinat:  YES


Second COVID19 Vaccination Mike:  YES


Third COVID19 Vaccination Date:  YES





Seasonal Allergies


Seasonal Allergies:  No





Past Medical History


Surgery/Hospitalization HX:  


T AND A , REFLUX, GB, NECK AND BACK SURG, COLON RESECTION, TUMOR REMOVED FROM L 


KIDNEY, RENAL ARTERY STENT BILAT, THYROID SURGERY, CARLOS, APPY


Surgeries:  Yes


Appendectomy, Breast, Cardiac, Gallbladder, Hysterectomy, Oophorectomy, 

Orthopedic, Thyroidectomy, Tonsillectomy


Respiratory:  Yes


Asthma


Currently Using CPAP:  No


Currently Using BIPAP:  No


Cardiac:  Yes (mitral valve prolapse, heart caths no stents)


Coronary Artery Disease, Heart Attack, Valvular Heart Disease


Neurological:  Yes


Reproductive Disorders:  Yes (TOTAL HYSTERECTOMY)


Female Reproductive Disorders:  Menstrual Problems, Endometriosis, Ovarian Cyst


GYN History:  Hysterectomy


Sexually Transmitted Disease:  No


HIV/AIDS:  No


Kidney Infection, Kidney Stones, UTI-Chronic


Gastrointestinal:  Yes (CHRONIC ABDOMINAL PAIN )


Gastroesophageal Reflux, Crohns Disease, Diverticulosis, Polyps, Hiatal Hernia, 

Ulcer, Gall Bladder Disease, Irritable Bowel


Musculoskeletal:  Yes (ANKLYLOSING SPONDYLITIS)


Degenerate Disk Disease, Arthritis, Chronic Back Pain


Endocrine:  Yes


Hypothyroidsim, Lupus


Loss of Vision:  Denies


Hearing Impairment:  Denies


Cancer:  No


Psychosocial:  No


Integumentary:  Yes (STAPH INFECTIONS)


Blood Disorders:  No


Adverse Reaction/Blood Tranf:  No





Family Medical History





Family history: Cardiovascular disease


Family history: Diabetes mellitus


  03 MOTHER


  09 BROTHER


Family history: Glaucoma


  03 MOTHER


Family history: Thyroid disorder


  03 MOTHER


Heart disease


  03 FATHER (AORTIC VALVE REPLACED)


No Pertinent Family Hx





Physical Exam


Vital Signs





Vital Signs - First Documented








 6/3/23





 09:07


 


Temp 36.7


 


Pulse 65


 


Resp 14


 


B/P (MAP) 124/66 (85)


 


Pulse Ox 97


 


O2 Delivery Room Air





Capillary Refill :


Height, Weight, BMI


Height: 5'6.00"


Weight: 182lbs. 0.0oz. [id number]; 28.00 BMI


Method:Stated





Progress/Results/Core Measures


Suspected Sepsis


SIRS


Temperature: 


Pulse: 65 


Respiratory Rate: 14


 


Laboratory Tests


6/3/23 09:20: White Blood Count 7.7


Blood Pressure 124 /66 


Mean: 85


 


Laboratory Tests


6/3/23 09:20: 


Creatinine 1.24, Platelet Count 288, Total Bilirubin 0.4








Results/Orders


Lab Results





Laboratory Tests








Test


 6/3/23


09:20 6/3/23


09:52 Range/Units


 


 


White Blood Count


 7.7 


 


 4.3-11.0


10^3/uL


 


Red Blood Count


 4.74 


 


 3.80-5.11


10^6/uL


 


Hemoglobin 14.9   11.5-16.0  g/dL


 


Hematocrit 44   35-52  %


 


Mean Corpuscular Volume 93   80-99  fL


 


Mean Corpuscular Hemoglobin 31   25-34  pg


 


Mean Corpuscular Hemoglobin


Concent 34 


 


 32-36  g/dL





 


Red Cell Distribution Width 13.6   10.0-14.5  %


 


Platelet Count


 288 


 


 130-400


10^3/uL


 


Mean Platelet Volume 9.7   9.0-12.2  fL


 


Immature Granulocyte % (Auto) 0    %


 


Neutrophils (%) (Auto) 70   42-75  %


 


Lymphocytes (%) (Auto) 21   12-44  %


 


Monocytes (%) (Auto) 8   0-12  %


 


Eosinophils (%) (Auto) 0   0-10  %


 


Basophils (%) (Auto) 1   0-10  %


 


Neutrophils # (Auto)


 5.4 


 


 1.8-7.8


10^3/uL


 


Lymphocytes # (Auto)


 1.6 


 


 1.0-4.0


10^3/uL


 


Monocytes # (Auto)


 0.6 


 


 0.0-1.0


10^3/uL


 


Eosinophils # (Auto)


 0.0 


 


 0.0-0.3


10^3/uL


 


Basophils # (Auto)


 0.0 


 


 0.0-0.1


10^3/uL


 


Immature Granulocyte # (Auto)


 0.0 


 


 0.0-0.1


10^3/uL


 


D-Dimer


 0.73 H


 


 0.00-0.49


UG/ML


 


Sodium Level 134 L  135-145  MMOL/L


 


Potassium Level 3.9   3.6-5.0  MMOL/L


 


Chloride Level 99     MMOL/L


 


Carbon Dioxide Level 25   21-32  MMOL/L


 


Anion Gap 10   5-14  MMOL/L


 


Blood Urea Nitrogen 19 H  7-18  MG/DL


 


Creatinine


 1.24 


 


 0.60-1.30


MG/DL


 


Estimat Glomerular Filtration


Rate 47 


 


  





 


BUN/Creatinine Ratio 15    


 


Glucose Level 199 H    MG/DL


 


Calcium Level 9.7   8.5-10.1  MG/DL


 


Corrected Calcium 9.5   8.5-10.1  MG/DL


 


Total Bilirubin 0.4   0.1-1.0  MG/DL


 


Aspartate Amino Transf


(AST/SGOT) 17 


 


 5-34  U/L





 


Alanine Aminotransferase


(ALT/SGPT) 22 


 


 0-55  U/L





 


Alkaline Phosphatase 51     U/L


 


C-Reactive Protein High


Sensitivity 1.35 H


 


 0.00-0.50


MG/DL


 


Total Protein 7.6   6.4-8.2  GM/DL


 


Albumin 4.3   3.2-4.5  GM/DL


 


Urine Color  YELLOW   


 


Urine Clarity  CLEAR   


 


Urine pH  5.5  5-9  


 


Urine Specific Gravity  1.010 L 1.016-1.022  


 


Urine Protein  NEGATIVE  NEGATIVE  


 


Urine Glucose (UA)  3+ H NEGATIVE  


 


Urine Ketones  NEGATIVE  NEGATIVE  


 


Urine Nitrite  NEGATIVE  NEGATIVE  


 


Urine Bilirubin  NEGATIVE  NEGATIVE  


 


Urine Urobilinogen  0.2  < = 1.0  MG/DL


 


Urine Leukocyte Esterase  NEGATIVE  NEGATIVE  


 


Urine RBC (Auto)  NEGATIVE  NEGATIVE  


 


Urine RBC  0-2   /HPF


 


Urine WBC  2-5   /HPF


 


Urine Squamous Epithelial


Cells 


 5-10 


  /HPF





 


Urine Crystals  NONE   /LPF


 


Urine Bacteria  LARGE H  /HPF


 


Urine Casts  NONE   /LPF


 


Urine Mucus  NEGATIVE   /LPF


 


Urine Culture Indicated  YES   








My Orders





Orders - KAREN CHAMBERS MD


Cbc With Automated Diff (6/3/23 09:04)


Comprehensive Metabolic Panel (6/3/23 09:04)


Hs C Reactive Protein (6/3/23 09:04)


Ua Culture If Indicated (6/3/23 09:04)


Ed Iv/Invasive Line Start (6/3/23 09:04)


Chest Pa/Lat (2 View) (6/3/23 09:16)


Urine Culture (6/3/23 09:52)


Fibrin Degradation Products (6/3/23 10:32)


Lactated Ringers (Lr 1000 Ml Iv Solution (6/3/23 10:45)


Fentanyl  Inj (Sublimaze Injection) (6/3/23 10:45)


Ct Natalie Chest/Noang Abd-Pelv W (6/3/23 11:36)


Iohexol Injection (Omnipaque 350 Mg/Ml 1 (6/3/23 11:45)


Received Contrast (Hold Metformin- Contr (6/3/23 11:45)


Ns (Ivpb) (Sodium Chloride 0.9% Ivpb Bag (6/3/23 11:45)


Ceftriaxone Pre-Mix (Rocephin Pre-Mix) (6/3/23 12:34)





Medications Given in ED





Current Medications








 Medications  Dose


 Ordered  Sig/Antoine


 Route  Start Time


 Stop Time Status Last Admin


Dose Admin


 


 Fentanyl Citrate  50 mcg  ONCE  ONCE


 IVP  6/3/23 10:45


 6/3/23 10:46 DC 6/3/23 10:50


50 MCG


 


 Lactated Ringer's  1,000 ml @ 


 0 mls/hr  Q0M ONCE


 IV  6/3/23 10:45


 6/3/23 10:46 DC 6/3/23 10:50


1,000 MLS/HR








Vital Signs/I&O











 6/3/23





 09:07


 


Temp 36.7


 


Pulse 65


 


Resp 14


 


B/P (MAP) 124/66 (85)


 


Pulse Ox 97


 


O2 Delivery Room Air





Capillary Refill :








Blood Pressure Mean:                    85











Diagnostic Imaging





   Diagonstic Imaging:  CT


   Plain Films/CT/US/NM/MRI:  chest, abdomen, pelvis


Comments


NAME:   DOMINGO CONRAD  


MED REC#:   [id number]  


ACCOUNT#:   [id number]  


PT STATUS:   REG ER  


:   1955  


PHYSICIAN:   KAREN CHAMBERS MD  


ADMIT DATE:   23/ER  


                                                                      

***Draft***  


Date of Exam:23  





CT NATALIE CHEST/NOANG ABD-PELV W  








INDICATION:   


 Right-sided chest and abdominal pain. Renal artery stents.  


 History of mass removed off the left kidney.  





 EXAMINATION:  


 CTA chest, abdomen and pelvis  





 TECHNIQUE:   


 Thin axial sections through the chest, abdomen, and pelvis was  


 obtained following intravenous contrast bolus. Multiplanar MIP  


 images were reconstructed and reviewed. All CT scans use one or  


 more of the following dose optimizing techniques: automated  


 exposure control, MA and/or KvP adjustment based on patient size  


 and exam type or iterative reconstruction.  





 COMPARISON:    


 2022. 2023.  





 FINDINGS:  





 CTA chest:  


 No evidence of pulmonary emboli to the subsegmental pulmonary  


 arteries. The thoracic aorta is normal without aneurysm or  


 dissection. The heart size is prominent. No pericardial effusion  


 is present. There is no mediastinal, hilar, or axillary  


 lymphadenopathy.   





 Stable nodule in the mid right lung measuring 1.3 cm. Smaller  


 nodule just anterior to this is stable measuring 0.6 cm. No new  


 suspicious pulmonary nodules are seen. Hazy opacities are seen in  


 the dependent lungs. There are no focal areas of consolidation.  


 No pneumothoraces are present. No central endobronchial  


 obstructing lesions are identified. There are no pleural  


 effusions.   





 No acute osseous abnormalities. Bone island is seen in the T12  


 vertebral body.  





 CTA ABDOMEN AND PELVIS:  


 No evidence of aneurysm or dissection in the abdominal aorta. The  


 celiac trunk, SMA, and AJNI are patent. Bilateral renal artery  


 stents are seen with robust flow distal to the stents.  





 No hydronephrosis or obstructing calculi. No solid renal mass is  


 seen. No perinephric fat stranding. The urinary bladder is  


 unremarkable.  





 There is hepatomegaly with hepatic steatosis. The spleen is also  


 prominent. No focal splenic or hepatic lesions. The portal vein  


 is patent. The gallbladder is surgically absent.  





 The pancreas and adrenal glands have a normal appearance. There  


 is no pathologically enlarged mesenteric or retroperitoneal  


 adenopathy.   





 The bowel loops are nondilated. A moderate amount of stool is  


 seen in the colon There is no free fluid or free air.   





 No acute osseous abnormalities.  





 There is no free air, loculated collection, or adenopathy in the  


 pelvis.   





 IMPRESSION:   


 1. No acute abnormalities in the chest, abdomen, or pelvis. No  


 evidence of pulmonary emboli. No aneurysm or dissection in the  


 thoracic or abdominal aorta.  





 2. Patent bilateral renal artery stents. No hydronephrosis or  


 solid renal mass. No perinephric fat stranding.  





 3. Stable nodules in the mid right lung. No new suspicious  


 nodules. Dependent atelectasis is seen bilaterally.  





 4. Hepatosplenomegaly with hepatic steatosis. No focal hepatic or  


 splenic lesions. No ascites.  





 5. Cardiomegaly.  














   Dictated on workstation # [id number]  








Dict:   23 1205  


Trans:   23 1225  


 [id number]  





Interpreted by:     MALISSA WATT DO





Departure


Impression





   Primary Impression:  


   UTI (urinary tract infection)


   Qualified Codes:  N39.0 - Urinary tract infection, site not specified


   Additional Impressions:  


   Right sided abdominal pain


   Pleuritic chest pain


   History of stent insertion of renal artery


Disposition:  01 HOME, SELF-CARE


Condition:  Stable





Departure-Patient Inst.


Decision time for Depature:  12:50


Referrals:  


TIA MOURA DO (PCP/Family)


Primary Care Physician


Patient Instructions:  Abdominal Pain, Adult ED, Urinary tract infections in 

adults





Add. Discharge Instructions:  


Drink plenty of clear liquids to stay well-hydrated.


Complete your antibiotics as prescribed.


Follow-up with your primary care provider on Monday afternoon by phone to review

urine culture results.


Continue taking aspirin and Plavix as previously directed.


You may continue taking hydrocodone as previously prescribed.  Be aware that 

hydrocodone may cause drowsiness so do not drive, operate machinery, or make 

important decisions on hydrocodone.  Hydrocodone may also cause constipation, so

you may wish to use a stool softener such as Colace while on hydrocodone.


Use Pyridium for urinary tract pain.  Be aware this may cause your urine to look

orange issue or reddish in appearance.


Return to the ER if you have worsening symptoms despite following these 

instructions.





All discharge instructions reviewed with patient and/or family. Voiced 

understanding.


Scripts


Phenazopyridine HCl (Pyridium) 200 Mg Tablet


1 TAB PO TID PRN for PAIN, #10 TAB


   Prov: KAREN CHAMBERS MD         6/3/23 


Cefdinir (Cefdinir) 300 Mg Capsule


300 MG PO BID, #14 CAP 0 Refills


   Prov: KAREN CHAMBERS MD         6/3/23











KAREN CHAMBERS MD         Johnny 3, 2023 11:39

## 2023-06-03 NOTE — DIAGNOSTIC IMAGING REPORT
INDICATION: 

Right-sided chest and abdominal pain. Renal artery stents.

History of mass removed off the left kidney.



EXAMINATION:

CTA chest, abdomen and pelvis



TECHNIQUE: 

Thin axial sections through the chest, abdomen, and pelvis was

obtained following intravenous contrast bolus. Multiplanar MIP

images were reconstructed and reviewed. All CT scans use one or

more of the following dose optimizing techniques: automated

exposure control, MA and/or KvP adjustment based on patient size

and exam type or iterative reconstruction.



COMPARISON:  

06/09/2022. 05/19/2023.



FINDINGS:



CTA chest:

No evidence of pulmonary emboli to the subsegmental pulmonary

arteries. The thoracic aorta is normal without aneurysm or

dissection. The heart size is prominent. No pericardial effusion

is present. There is no mediastinal, hilar, or axillary

lymphadenopathy. 



Stable nodule in the mid right lung measuring 1.3 cm. Smaller

nodule just anterior to this is stable measuring 0.6 cm. No new

suspicious pulmonary nodules are seen. Hazy opacities are seen in

the dependent lungs. There are no focal areas of consolidation.

No pneumothoraces are present. No central endobronchial

obstructing lesions are identified. There are no pleural

effusions. 



No acute osseous abnormalities. Bone island is seen in the T12

vertebral body.



CTA ABDOMEN AND PELVIS:

No evidence of aneurysm or dissection in the abdominal aorta. The

celiac trunk, SMA, and JANI are patent. Bilateral renal artery

stents are seen with robust flow distal to the stents.



No hydronephrosis or obstructing calculi. No solid renal mass is

seen. No perinephric fat stranding. The urinary bladder is

unremarkable.



There is hepatomegaly with hepatic steatosis. The spleen is also

prominent. No focal splenic or hepatic lesions. The portal vein

is patent. The gallbladder is surgically absent.



The pancreas and adrenal glands have a normal appearance. There

is no pathologically enlarged mesenteric or retroperitoneal

adenopathy. 



The bowel loops are nondilated. A moderate amount of stool is

seen in the colon There is no free fluid or free air. 



No acute osseous abnormalities.



There is no free air, loculated collection, or adenopathy in the

pelvis. 



IMPRESSION: 

1. No acute abnormalities in the chest, abdomen, or pelvis. No

evidence of pulmonary emboli. No aneurysm or dissection in the

thoracic or abdominal aorta.



2. Patent bilateral renal artery stents. No hydronephrosis or

solid renal mass. No perinephric fat stranding.



3. Stable nodules in the mid right lung. No new suspicious

nodules. Dependent atelectasis is seen bilaterally.



4. Hepatosplenomegaly with hepatic steatosis. No focal hepatic or

splenic lesions. No ascites.



5. Cardiomegaly.







Dictated by: 



  Dictated on workstation # [id number]

## 2023-07-07 ENCOUNTER — HOSPITAL ENCOUNTER (EMERGENCY)
Dept: HOSPITAL 75 - ER | Age: 68
Discharge: HOME | End: 2023-07-07
Payer: MEDICARE

## 2023-07-07 VITALS — WEIGHT: 176.37 LBS | BODY MASS INDEX: 28.69 KG/M2 | HEIGHT: 65.75 IN

## 2023-07-07 VITALS — SYSTOLIC BLOOD PRESSURE: 127 MMHG | DIASTOLIC BLOOD PRESSURE: 70 MMHG

## 2023-07-07 DIAGNOSIS — I70.1: ICD-10-CM

## 2023-07-07 DIAGNOSIS — Z79.899: ICD-10-CM

## 2023-07-07 DIAGNOSIS — R10.11: Primary | ICD-10-CM

## 2023-07-07 DIAGNOSIS — Z90.49: ICD-10-CM

## 2023-07-07 DIAGNOSIS — Z79.82: ICD-10-CM

## 2023-07-07 DIAGNOSIS — R79.82: ICD-10-CM

## 2023-07-07 DIAGNOSIS — R74.01: ICD-10-CM

## 2023-07-07 DIAGNOSIS — Z87.19: ICD-10-CM

## 2023-07-07 DIAGNOSIS — Z79.02: ICD-10-CM

## 2023-07-07 DIAGNOSIS — E66.9: ICD-10-CM

## 2023-07-07 DIAGNOSIS — Z95.828: ICD-10-CM

## 2023-07-07 LAB
ALBUMIN SERPL-MCNC: 4.5 GM/DL (ref 3.2–4.5)
ALP SERPL-CCNC: 60 U/L (ref 40–136)
ALT SERPL-CCNC: 59 U/L (ref 0–55)
APTT PPP: YELLOW S
BACTERIA #/AREA URNS HPF: (no result) /HPF
BASOPHILS # BLD AUTO: 0 10^3/UL (ref 0–0.1)
BASOPHILS NFR BLD AUTO: 0 % (ref 0–10)
BILIRUB SERPL-MCNC: 0.5 MG/DL (ref 0.1–1)
BILIRUB UR QL STRIP: NEGATIVE
BUN/CREAT SERPL: 16
CALCIUM SERPL-MCNC: 9.7 MG/DL (ref 8.5–10.1)
CHLORIDE SERPL-SCNC: 93 MMOL/L (ref 98–107)
CO2 SERPL-SCNC: 23 MMOL/L (ref 21–32)
CREAT SERPL-MCNC: 1.23 MG/DL (ref 0.6–1.3)
EOSINOPHIL # BLD AUTO: 0 10^3/UL (ref 0–0.3)
EOSINOPHIL NFR BLD AUTO: 0 % (ref 0–10)
FIBRINOGEN PPP-MCNC: CLEAR MG/DL
GFR SERPLBLD BASED ON 1.73 SQ M-ARVRAT: 48 ML/MIN
GLUCOSE SERPL-MCNC: 211 MG/DL (ref 70–105)
GLUCOSE UR STRIP-MCNC: (no result) MG/DL
HCT VFR BLD CALC: 45 % (ref 35–52)
HGB BLD-MCNC: 15.8 G/DL (ref 11.5–16)
KETONES UR QL STRIP: NEGATIVE
LEUKOCYTE ESTERASE UR QL STRIP: NEGATIVE
LYMPHOCYTES # BLD AUTO: 1 10^3/UL (ref 1–4)
LYMPHOCYTES NFR BLD AUTO: 18 % (ref 12–44)
MANUAL DIFFERENTIAL PERFORMED BLD QL: NO
MCH RBC QN AUTO: 32 PG (ref 25–34)
MCHC RBC AUTO-ENTMCNC: 35 G/DL (ref 32–36)
MCV RBC AUTO: 90 FL (ref 80–99)
MONOCYTES # BLD AUTO: 0.5 10^3/UL (ref 0–1)
MONOCYTES NFR BLD AUTO: 9 % (ref 0–12)
NEUTROPHILS # BLD AUTO: 4 10^3/UL (ref 1.8–7.8)
NEUTROPHILS NFR BLD AUTO: 72 % (ref 42–75)
NITRITE UR QL STRIP: NEGATIVE
PH UR STRIP: 6 [PH] (ref 5–9)
PLATELET # BLD: 245 10^3/UL (ref 130–400)
PMV BLD AUTO: 9.4 FL (ref 9–12.2)
POTASSIUM SERPL-SCNC: 3.2 MMOL/L (ref 3.6–5)
PROT SERPL-MCNC: 8.4 GM/DL (ref 6.4–8.2)
PROT UR QL STRIP: NEGATIVE
RBC #/AREA URNS HPF: (no result) /HPF
SODIUM SERPL-SCNC: 132 MMOL/L (ref 135–145)
SP GR UR STRIP: <=1.005 (ref 1.02–1.02)
SQUAMOUS #/AREA URNS HPF: (no result) /HPF
WBC # BLD AUTO: 5.6 10^3/UL (ref 4.3–11)
WBC #/AREA URNS HPF: (no result) /HPF

## 2023-07-07 PROCEDURE — 36415 COLL VENOUS BLD VENIPUNCTURE: CPT

## 2023-07-07 PROCEDURE — 81000 URINALYSIS NONAUTO W/SCOPE: CPT

## 2023-07-07 PROCEDURE — 80053 COMPREHEN METABOLIC PANEL: CPT

## 2023-07-07 PROCEDURE — 74175 CTA ABDOMEN W/CONTRAST: CPT

## 2023-07-07 PROCEDURE — 85025 COMPLETE CBC W/AUTO DIFF WBC: CPT

## 2023-07-07 PROCEDURE — 76770 US EXAM ABDO BACK WALL COMP: CPT

## 2023-07-07 PROCEDURE — 86141 C-REACTIVE PROTEIN HS: CPT

## 2023-07-07 NOTE — DIAGNOSTIC IMAGING REPORT
INDICATION: severe right flank pain



TECHNIQUE: Multiple real-time grayscale sonographic images were

obtained of the kidneys.



CORRELATION: CT of 06/03/2023.



FINDINGS:

RIGHT KIDNEY: 11.5 x 5.8 x 6 cm.  

LEFT KIDNEY: 11.5 x 4.4 x 5.6 cm. 

There is a subtle hypoechoic area centrally in the right kidney,

may be reflective of a cyst but is not suggested at recent CT

imaging, therefore, could reflect potentially mildly dilated

calyx. This area measures 2.2 x 2.5 cm. Left collecting system is

unremarkable. Renal parenchyma of both kidneys is otherwise

unremarkable.



URINARY BLADDER:

The partially distended bladder has an unremarkable appearance.

Bilateral ureteral jets are present.



IMPRESSION:

1. 2.5 cm subtle low-attenuating region centrally in the right

kidney, may be artifactual, could potentially reflect a cyst but

is not demonstrated on recent CT imaging, therefore may reflect a

mildly prominent dilated calyx. Overt hydronephrosis however is

not otherwise suggested.



Dictated by: 



  Dictated on workstation # [id number]

## 2023-07-07 NOTE — ED ABDOMINAL PAIN
General


Chief Complaint:  Abdominal/GI Problems


Stated Complaint:  KIDNEY ISSUES | LOWER BACK PAIN


Nursing Triage Note:  


PT AMB TO TRIAGE, PT JUST RECENTLY HAD STENTS PUT IN BILATERALLY, PT CURRENTLY 


HAS SEVERE R FLANK PAIN 9/10. PT STATES JUST OFF ANTIBIOTIC ONE WEEK AGO. PT 


SENT FROM [facility]


Source of Information:  Patient


Exam Limitations:  No Limitations





History of Present Illness


Date Seen by Provider:  2023


Time Seen by Provider:  13:35


Initial Comments


Patient is a 68-year-old female who presents to the emergency department with a 

chief complaint of concern for right kidney issue, severe right flank pain and 

right upper quadrant abdominal pain since yesterday.  Patient has had 3 urinary 

tract infections treated over the last 5 or 6 weeks.  She was seen here in the 

beginning of  and diagnosed with renal artery stenosis.  She had an 80% 

blockage on the right and a 50% blockage on the left.  She was subsequently seen

at [facility] in [place] by Dr. Cobos and had stents placed in her bilateral 

renal arteries.  She has been taking her Plavix and aspirin.  She is on for 

blood pressure medications.  She states she has had some mild discomfort since 

the stents were placed.  She has been concerned because every time she has had a

urine done she has had a little blood in her urine.  She went to [facility] this 

morning and was sent to the emergency room with this pain.  She has not taken 

anything for the pain.  No fevers or chills.  No nausea.  Nothing makes the pain

any better, movement and palpation make the pain worse.  No black or bloody 

stools.  No dysuria.  She just got off antibiotics about a week ago.





Patient has some hydrocodone at home and states that 1 would take the edge off 

but yesterday it required 2, it was so severe.


Timing/Duration:  24 Hours


Severity/Quality:  Severe, Sharp (and burning)


Location:  RUQ, Flank (Right Flank)


Radiation:  No Radiation


Activities at Onset:  None


Modifying Factors:  Improves With Other (hydrocodone takes the "edge off")


Associated Symptoms:  Shortness of Air (due to pain)





Allergies and Home Medications


Allergies


Coded Allergies:  


     Penicillins (Verified  Allergy, Severe, Anaphylaxis, 6/3/23)


   May take cefdinir and rocephin


     clarithromycin (Verified  Allergy, Unknown, 18)


   Patient as tolerated azithromycin


     gluten (Verified  Allergy, Unknown, 14)


     metoclopramide (Unverified  Allergy, Unknown, 3/30/16)


     piroxicam (Verified  Allergy, Unknown, Pt has received Ketorolac w/o issue,

18)


     sulfacetamide sodium (Verified  Allergy, Unknown, 14)





Patient Home Medication List


Home Medication List Reviewed:  Yes


Bisoprolol Fumarate (Bisoprolol Fumarate) 10 Mg Tablet, 10 MG PO BID, (Reported)


   Entered as Reported by: ALEX BEJARANO on 18 1531


Cefdinir (Cefdinir) 300 Mg Capsule, 300 MG PO BID


   Prescribed by: TIA SLATER on 21 1246


Cefdinir (Cefdinir) 300 Mg Capsule, 300 MG PO BID


   Prescribed by: KAREN DELONG on 6/3/23 1254


Cholecalciferol (Vitamin D3) (Vitamin D3) 125 Mcg Capsule, 125 MCG PO HS, 

(Reported)


   Entered as Reported by: DIPAK PHILLIPS on 21 1437


Cyanocobalamin (Cyanocobalamin Injection) 1,000 Mcg/Ml Inj, 1,000 MCG IM EVERY 2

MONTHS, (Reported)


   Entered as Reported by: DIPAK PHILLIPS on 21 1437


Duloxetine HCl (Duloxetine HCl) 60 Mg Capsule.dr, 60 MG PO HS, (Reported)


   Entered as Reported by: ALEX BEJARANO on 18 1518


Empagliflozin/Linagliptin (Glyxambi 25 mg-5 mg Tablet) 1 Each Tablet, 1 EA PO 

DAILY, (Reported)


   Entered as Reported by: DIPAK PHILLIPS on 21 1437


Ergocalciferol (Vitamin D2) (Vitamin D2) 1,250 Mcg Capsule, 1,250 MCG PO SAT, 

(Reported)


   Entered as Reported by: DIPAK PHILLIPS on 21 1437


Estradiol (Estradiol Patch Twice Weekly 0.1mg/hr) 1 Each Patch.tdsw, 1 PATCH TD 

TWICE WEEKLY, (Reported)


   Entered as Reported by: DIPAK PHILLIPS on 21 1437


Fexofenadine HCl (Allegra Allergy) 180 Mg Tablet, 180 MG PO DAILY, (Reported)


   Entered as Reported by: DIPAK PHILLIPS on 21 1437


Glipizide (Glipizide) 5 Mg Tablet, 5 MG PO DAILY, (Reported)


   Entered as Reported by: DIPAK PHILLIPS on 21 1437


Hydrocodone/Acetaminophen (Hydrocodone-Acetamin 5-325 mg) 1 Each Tablet, 1-2 TAB

PO Q8H PRN for PAIN-MODERATE (5-7), (Reported)


   Entered as Reported by: DIPAK PHILLIPS on 21 1437


Levothyroxine Sodium (Tirosint) 88 Mcg Capsule, 88 MCG PO DAILY, (Reported)


   Entered as Reported by: DIPAK PHILLIPS on 21 1437


Liothyronine Sodium (Liothyronine Sodium) 5 Mcg Tablet, 5 MCG PO DAILY, 

(Reported)


   Entered as Reported by: ALEX BEJARANO on 18 1531


Metronidazole (Flagyl) 500 Mg Tablet, 500 MG PO TID


   Prescribed by: TIA SLATER on 21 1246


Pantoprazole Sodium (Pantoprazole Sodium) 40 Mg Tablet.dr, 40 MG PO HS, 

(Reported)


   Entered as Reported by: ALEX BEJARANO on 18 1518


Phenazopyridine HCl (Pyridium) 200 Mg Tablet, 1 TAB PO TID PRN for PAIN


   Prescribed by: KAREN DELONG on 6/3/23 1254


Polyethylene Glycol 3350 (Miralax) 17 Gm Powd.pack, 17 GM PO BID, (Reported)


   Entered as Reported by: DIPAK PHILLIPS on 21 143


Valsartan (Valsartan) 320 Mg Tablet, 320 MG PO DAILY, (Reported)


   Entered as Reported by: DIPAK PHILLIPS on 21 1437





Review of Systems


Review of Systems


Constitutional:  see HPI


Respiratory:  No Symptoms Reported


Cardiovascular:  No Symptoms Reported


Gastrointestinal:  Abdominal Pain


Genitourinary:  No Symptoms Reported


Musculoskeletal:  back pain (right flank)


Skin:  no symptoms reported





Past Medical-Social-Family Hx


Patient Social History


Tobacco Use?:  No


Substance use?:  No


Alcohol Use?:  No


Pt feels they are or have been:  No





Immunizations Up To Date


Tetanus Booster (TDap):  Less than 5yrs


PED Vaccines UTD:  Yes


First/Initial COVID19 Vaccinat:  YES


Second COVID19 Vaccination Mike:  YES


Third COVID19 Vaccination Date:  YES





Seasonal Allergies


Seasonal Allergies:  No





Past Medical History


Surgery/Hospitalization HX:  


T AND A , REFLUX, GB, NECK AND BACK SURG, COLON RESECTION, TUMOR REMOVED FROM L 


KIDNEY, RENAL ARTERY STENT BILAT, THYROID SURGERY, CARLOS, APPY


Surgeries:  Yes


Appendectomy, Breast, Cardiac, Gallbladder, Hysterectomy, Oophorectomy, 

Orthopedic, Thyroidectomy, Tonsillectomy, Vascular Surgery


Respiratory:  Yes


Asthma


Currently Using CPAP:  No


Currently Using BIPAP:  No


Cardiac:  Yes (mitral valve prolapse, heart caths no stents)


Coronary Artery Disease, Heart Attack, Peripheral Vascular, Valvular Heart 

Disease


Neurological:  Yes


Reproductive Disorders:  Yes (TOTAL HYSTERECTOMY)


Female Reproductive Disorders:  Menstrual Problems, Endometriosis, Ovarian Cyst


GYN History:  Hysterectomy


Sexually Transmitted Disease:  No


HIV/AIDS:  No


Genitourinary:  Yes (Bilateral renal artery stenosis status post bilateral 

stent)


Kidney Infection, Kidney Stones, UTI-Chronic


Gastrointestinal:  Yes (CHRONIC ABDOMINAL PAIN )


Gastroesophageal Reflux, Crohns Disease, Diverticulosis, Polyps, Hiatal Hernia, 

Ulcer, Gall Bladder Disease, Irritable Bowel


Musculoskeletal:  Yes (ANKLYLOSING SPONDYLITIS)


Degenerate Disk Disease, Arthritis, Chronic Back Pain


Endocrine:  Yes


Hypothyroidsim, Lupus


Loss of Vision:  Denies


Hearing Impairment:  Denies


Cancer:  No


Psychosocial:  No


Integumentary:  Yes (STAPH INFECTIONS)


Blood Disorders:  No


Adverse Reaction/Blood Tranf:  No





Family Medical History





Family history: Cardiovascular disease


Family history: Diabetes mellitus


  03 MOTHER


  09 BROTHER


Family history: Glaucoma


  03 MOTHER


Family history: Thyroid disorder


  03 MOTHER


Heart disease


  03 FATHER (AORTIC VALVE REPLACED)


No Pertinent Family Hx





Physical Exam


Vital Signs





Vital Signs - First Documented








 23





 13:25


 


Temp 36.8


 


Pulse 86


 


Resp 18


 


B/P (MAP) 123/70 (87)


 


Pulse Ox 99





Capillary Refill : Less Than 3 Seconds


Height/Weight/BMI


Height: 5'6.00"


Weight: 182lbs. 0.0oz. [id number]; 28.00 BMI


Method:Stated


General Appearance:  WD/WN, mild distress


HEENT:  PERRL/EOMI


Neck:  normal inspection


Respiratory:  lungs clear, normal breath sounds, no respiratory distress, no 

accessory muscle use


Cardiovascular:  regular rate, rhythm, other (2+ femoral pulse on the right)


Gastrointestinal:  soft, abnormal bowel sounds (hypoactive), guarding (RUQ and 

right flank - exquisitely tender to palpation. no overlying rashes/erythema)


Extremities:  normal range of motion, non-tender, normal inspection, no pedal 

edema, no calf tenderness


Back:  normal inspection, CVA tenderness (R)


Neurologic/Psychiatric:  alert, normal mood/affect, oriented x 3


Skin:  normal color, warm/dry; No rash





Progress/Results/Core Measures


Results/Orders


Lab Results





Laboratory Tests








Test


 23


13:44 23


14:28 Range/Units


 


 


White Blood Count


 5.6 


 


 4.3-11.0


10^3/uL


 


Red Blood Count


 5.02 


 


 3.80-5.11


10^6/uL


 


Hemoglobin 15.8   11.5-16.0  g/dL


 


Hematocrit 45   35-52  %


 


Mean Corpuscular Volume 90   80-99  fL


 


Mean Corpuscular Hemoglobin 32   25-34  pg


 


Mean Corpuscular Hemoglobin


Concent 35 


 


 32-36  g/dL





 


Red Cell Distribution Width 13.2   10.0-14.5  %


 


Platelet Count


 245 


 


 130-400


10^3/uL


 


Mean Platelet Volume 9.4   9.0-12.2  fL


 


Immature Granulocyte % (Auto) 1    %


 


Neutrophils (%) (Auto) 72   42-75  %


 


Lymphocytes (%) (Auto) 18   12-44  %


 


Monocytes (%) (Auto) 9   0-12  %


 


Eosinophils (%) (Auto) 0   0-10  %


 


Basophils (%) (Auto) 0   0-10  %


 


Neutrophils # (Auto)


 4.0 


 


 1.8-7.8


10^3/uL


 


Lymphocytes # (Auto)


 1.0 


 


 1.0-4.0


10^3/uL


 


Monocytes # (Auto)


 0.5 


 


 0.0-1.0


10^3/uL


 


Eosinophils # (Auto)


 0.0 


 


 0.0-0.3


10^3/uL


 


Basophils # (Auto)


 0.0 


 


 0.0-0.1


10^3/uL


 


Immature Granulocyte # (Auto)


 0.0 


 


 0.0-0.1


10^3/uL


 


Sodium Level 132 L  135-145  MMOL/L


 


Potassium Level 3.2 L  3.6-5.0  MMOL/L


 


Chloride Level 93 L    MMOL/L


 


Carbon Dioxide Level 23   21-32  MMOL/L


 


Anion Gap 16 H  5-14  MMOL/L


 


Blood Urea Nitrogen 20 H  7-18  MG/DL


 


Creatinine


 1.23 


 


 0.60-1.30


MG/DL


 


Estimat Glomerular Filtration


Rate 48 


 


  





 


BUN/Creatinine Ratio 16    


 


Glucose Level 211 H    MG/DL


 


Calcium Level 9.7   8.5-10.1  MG/DL


 


Corrected Calcium 9.3   8.5-10.1  MG/DL


 


Total Bilirubin 0.5   0.1-1.0  MG/DL


 


Aspartate Amino Transf


(AST/SGOT) 48 H


 


 5-34  U/L





 


Alanine Aminotransferase


(ALT/SGPT) 59 H


 


 0-55  U/L





 


Alkaline Phosphatase 60     U/L


 


C-Reactive Protein High


Sensitivity 4.11 H


 


 0.00-0.50


MG/DL


 


Total Protein 8.4 H  6.4-8.2  GM/DL


 


Albumin 4.5   3.2-4.5  GM/DL


 


Urine Color  YELLOW   


 


Urine Clarity  CLEAR   


 


Urine pH  6.0  5-9  


 


Urine Specific Gravity  <=1.005  1.016-1.022  


 


Urine Protein  NEGATIVE  NEGATIVE  


 


Urine Glucose (UA)  3+ H NEGATIVE  


 


Urine Ketones  NEGATIVE  NEGATIVE  


 


Urine Nitrite  NEGATIVE  NEGATIVE  


 


Urine Bilirubin  NEGATIVE  NEGATIVE  


 


Urine Urobilinogen  0.2  < = 1.0  MG/DL


 


Urine Leukocyte Esterase  NEGATIVE  NEGATIVE  


 


Urine RBC (Auto)  NEGATIVE  NEGATIVE  


 


Urine RBC  NONE   /HPF


 


Urine WBC  RARE   /HPF


 


Urine Squamous Epithelial


Cells 


 2-5 


  /HPF





 


Urine Crystals  NONE   /LPF


 


Urine Bacteria  TRACE   /HPF


 


Urine Casts  NONE   /LPF


 


Urine Mucus  SMALL H  /LPF


 


Urine Culture Indicated  NO   








My Orders





Orders - BRADLEY KIRAN MD


Ed Iv/Invasive Line Start (23 13:47)


Cbc With Automated Diff (23 13:47)


Comprehensive Metabolic Panel (23 13:47)


Ua Culture If Indicated (23 13:47)


Hs C Reactive Protein (23 13:47)


Fentanyl  Inj (Sublimaze Injection) (23 14:15)


Fentanyl  Inj (Sublimaze Injection) (23 14:45)


Us Renal Bilateral 70717 (23 14:43)


Ct Angio Abdomen W (23 15:54)


Lactated Ringers (Lr 1000 Ml Iv Solution (23 15:54)


Iohexol Injection (Omnipaque 350 Mg/Ml 1 (23 16:15)


Ns (Ivpb) (Sodium Chloride 0.9% Ivpb Bag (23 16:15)


Fentanyl  Inj (Sublimaze Injection) (23 16:30)





Medications Given in ED





Current Medications








 Medications  Dose


 Ordered  Sig/Antoine


 Route  Start Time


 Stop Time Status Last Admin


Dose Admin


 


 Fentanyl Citrate  50 mcg  ONCE  ONCE


 IVP  23 14:15


 23 14:16 DC 23 14:22


50 MCG


 


 Fentanyl Citrate  50 mcg  ONCE  ONCE


 IVP  23 14:45


 23 14:46 DC 23 14:56


50 MCG


 


 Fentanyl Citrate  50 mcg  ONCE  ONCE


 IVP  23 16:30


 23 16:31 DC 23 16:47


50 MCG


 


 Iohexol  100 ml  ONCE  ONCE


 IV  23 16:15


 23 16:16 DC 23 16:35


85 ML


 


 Sodium Chloride  100 ml  ONCE  ONCE


 IV  23 16:15


 23 16:16 DC 23 16:35


80 ML








Vital Signs/I&O











 23





 13:25


 


Temp 36.8


 


Pulse 86


 


Resp 18


 


B/P (MAP) 123/70 (87)


 


Pulse Ox 99














Blood Pressure Mean:                    87











Progress


Progress Note #1:  


   Time:  16:01


Progress Note


Patient seen and evaluated by me.  Evaluation today includes physical exam, CBC,

Chem-12, urinalysis, bilateral renal ultrasound.  Patient's physical exam 

pertinent for well-developed well-nourished moderately obese female in distress 

due to right flank and right upper quadrant abdominal pain.  Vital signs are 

stable.  She does voluntarily guard in the right flank.  She seems to be 

exquisitely tender.  There is CVA tenderness on the right.  No overlying rashes,

vesicles, erythema.  She is afebrile.  No lower extremity edema.  Distal pulses 

are intact.





Differential diagnosis based on history and physical exam urinary tract infe

ction/pyelonephritis/kidney stone/acute occlusion of renal artery stent





Labs independently reviewed and interpreted by me.  Her CBC is normal.  Her 

Chem-12 shows a sodium of 132 potassium of 3.2, chloride 93 bicarb 23 BUN of 20 

creatinine of 1.23 serum glucose 211.  Her LFTs are minimally elevated AST 48 

ALT of 59.  Total protein is up to 8.4.  Her CRP is elevated at 4.11.  Urinal

ysis is clear of infection and blood.  She does have 3+ glucose.  Bilateral 

renal ultrasound does not show any gross abnormalities.  Flow was established 

both kidneys but partially obscured due to bowel.  There was a question of a 

cyst on the right kidney.  Patient is reevaluated at this time and continues to 

have fairly significant pain despite 2 doses of 50 mcg of fentanyl.  I discussed

with her the need to visualize patent stent in the right renal artery as this 

would be the most concerning etiology for her pain.  She has no evidence of 

pyelonephritis.  She does not have hematuria consistent with kidney stone and I 

was able to review prior CTs which did not demonstrate any intraparenchymal 

stones.  She is resting fairly comfortably at the moment, will start some 

lactated Ringer's and have ordered CT angio abdomen.


Progress Note #2:  


   Time:  17:55


Progress Note


Patient's CT angio of the abdomen only does not reveal anything remarkable 

regarding the right flank.  She has normal perfusion of both kidneys.  The liver

does look enlarged and has characteristics compatible with steatosis.  No free 

fluid visualized.  Bowel looks normal.  No other concerning findings.  Patient 

states her pain is almost completely controlled.  She does have pain medications

at home.  Reassurance is provided.  I advised her if she develops worsening pain

or fever she needs to return to the emergency department.  Encouraged follow-up 

with her primary care physician.  All questions are sought and answered.  

Patient is improved at discharge.





Diagnostic Imaging





   Diagonstic Imaging:  Ultrasound


Comments


                 [facility]


                                [place], [place]





NAME:   DOMINGO CONRAD


MED REC#:   [id number]


ACCOUNT#:   [id number]


PT STATUS:   REG ER


:   1955


PHYSICIAN:   BRADLEY KIRAN MD


ADMIT DATE:   23/ER


                                   ***Draft***


Date of Exam:23





US RENAL BILATERAL [id number]








INDICATION: severe right flank pain





TECHNIQUE: Multiple real-time grayscale sonographic images were


obtained of the kidneys.





CORRELATION: CT of 2023.





FINDINGS:


RIGHT KIDNEY: 11.5 x 5.8 x 6 cm.  


LEFT KIDNEY: 11.5 x 4.4 x 5.6 cm. 


There is a subtle hypoechoic area centrally in the right kidney,


may be reflective of a cyst but is not suggested at recent CT


imaging, therefore, could reflect potentially mildly dilated


calyx. This area measures 2.2 x 2.5 cm. Left collecting system is


unremarkable. Renal parenchyma of both kidneys is otherwise


unremarkable.





URINARY BLADDER:


The partially distended bladder has an unremarkable appearance.


Bilateral ureteral jets are present.





IMPRESSION:


1. 2.5 cm subtle low-attenuating region centrally in the right


kidney, may be artifactual, could potentially reflect a cyst but


is not demonstrated on recent CT imaging, therefore may reflect a


mildly prominent dilated calyx. Overt hydronephrosis however is


not otherwise suggested.





  Dictated on workstation # [id number]








Dict:   23 1524


Trans:   23 1530


AS6 [id number]





Interpreted by:     NADYA MACKENZIE DO


Electronically signed by:





Departure


Impression





   Primary Impression:  


   Right flank pain


Disposition:  01 HOME, SELF-CARE


Condition:  Improved





Departure-Patient Inst.


Decision time for Depature:  17:56


Referrals:  


TIA SLATER DO (PCP/Family)


Primary Care Physician


Patient Instructions:  Abdominal Pain, Adult ED





Add. Discharge Instructions:  


Continue your daily prescribed medications.





Use your hydrocodone as needed every 6 hours.  You can take 1 additional extra 

strength Tylenol with the hydrocodone.





You can try 1 Benadryl tablet, 25 mg at night with the hydrocodone to help with 

sleep.





If you develop a fever, worsening pain, rash or any other emergent, concerning 

symptoms please return to the emergency department for reevaluation.





Please follow-up with Dr. Slater next week.





Copy


Copies To 1:   TIA SLATER KATHRYN M MD          2023 13:47

## 2023-07-07 NOTE — DIAGNOSTIC IMAGING REPORT
EXAMINATION: CT angiography of the abdomen.



TECHNIQUE: After intravenous administration of contrast, thin

section axial CT angiography of the abdomen and were obtained. 3D

MIP reformats were provided. All CT scans use one or more of the

following dose optimizing techniques: Automated exposure control,

MA and/or KvP adjustment based on a patient size and exam type,

or iterative reconstruction. 



HISTORY: Severe right flank pain.



COMPARISON: 06/03/2023.



FINDINGS: 



Vascular: There is no aneurysm, dissection, or significant

stenosis. Bilateral renal artery stents are present and appear

patent.



Lung bases: The lung bases are clear.



Solid organs: There is diffuse hypoattenuation of the liver which

could be seen with hepatic steatosis. The gallbladder is

surgically absent. There is no biliary ductal dilation. Pancreas

is normal.  Spleen is normal. Adrenal glands are normal. The

kidneys are normal without hydronephrosis.



Bowel: No bowel obstruction.



Peritoneum: There is no intraperitoneal free fluid or free air.

No suspicious lymphadenopathy. 



Musculoskeletal: Degenerative changes of the spine without

suspicious osseous lesion or compression fracture.



IMPRESSION:



1. No aneurysm, dissection, or significant stenosis.

2. No acute abnormality in the visualized abdomen.



Dictated by: 



  Dictated on workstation # [id number]

## 2023-09-13 ENCOUNTER — HOSPITAL ENCOUNTER (OUTPATIENT)
Dept: HOSPITAL 75 - RAD | Age: 68
End: 2023-09-13
Payer: MEDICARE

## 2023-09-13 DIAGNOSIS — Z95.828: ICD-10-CM

## 2023-09-13 DIAGNOSIS — N18.30: Primary | ICD-10-CM

## 2023-09-13 PROCEDURE — 93975 VASCULAR STUDY: CPT

## 2023-09-13 PROCEDURE — 76770 US EXAM ABDO BACK WALL COMP: CPT

## 2023-09-13 NOTE — DIAGNOSTIC IMAGING REPORT
INDICATION:  CHRONIC KIDNEY DISEASE. RECENT BILATERAL RENAL

ARTERY STENT.



TECHNIQUE:

Multiple real-time grayscale sonographic images, color and duplex

Doppler images were obtained of the urinary system.



FINDINGS:

Aortic velocity:  77 cm/sec.



RIGHT kidney:

Size: 11.5 x 4.9 x 4.6 cm 

The right renal parenchyma with probable cyst mid aspect 1.7 x

1.8 x 1.4 cm. 

The right renal artery is visualized in its proximal, mid and

distal aspect.  

Maximum renal artery velocity:  75cm/sec  

Maximum renal artery/aortic ratio:  1.0



LEFT kidney:

Size:  10.5 x 5.8 x 5.4 cm 

The left renal parenchyma and collecting system appear

unremarkable.

The left renal artery is visualized in its proximal, mid and

distal aspect.  

Maximum renal artery velocity:  114cm/sec

Maximum renal artery/aortic ratio:  1.5



Bladder:

Not imaged.



IMPRESSION:

1. Unremarkable renal ultrasound with Doppler.





Dictated by: 



  Dictated on workstation # [id number]